# Patient Record
Sex: MALE | URBAN - METROPOLITAN AREA
[De-identification: names, ages, dates, MRNs, and addresses within clinical notes are randomized per-mention and may not be internally consistent; named-entity substitution may affect disease eponyms.]

---

## 2019-10-10 ENCOUNTER — APPOINTMENT (OUTPATIENT)
Dept: URBAN - METROPOLITAN AREA CLINIC 186 | Age: 59
Setting detail: DERMATOLOGY
End: 2019-10-10

## 2019-10-10 DIAGNOSIS — L57.0 ACTINIC KERATOSIS: ICD-10-CM

## 2019-10-10 PROBLEM — D04.72 CARCINOMA IN SITU OF SKIN OF LEFT LOWER LIMB, INCLUDING HIP: Status: ACTIVE | Noted: 2019-10-10

## 2019-10-10 PROBLEM — D48.5 NEOPLASM OF UNCERTAIN BEHAVIOR OF SKIN: Status: ACTIVE | Noted: 2019-10-10

## 2019-10-10 PROCEDURE — OTHER ADDITIONAL NOTES: OTHER

## 2019-10-10 PROCEDURE — OTHER DEFER: OTHER

## 2019-10-10 PROCEDURE — OTHER LIQUID NITROGEN: OTHER

## 2019-10-10 PROCEDURE — 17000 DESTRUCT PREMALG LESION: CPT

## 2019-10-10 PROCEDURE — 99241: CPT | Mod: 25

## 2019-10-10 PROCEDURE — OTHER MIPS QUALITY: OTHER

## 2019-10-10 PROCEDURE — OTHER COUNSELING: OTHER

## 2019-10-10 PROCEDURE — 17003 DESTRUCT PREMALG LES 2-14: CPT

## 2019-10-10 ASSESSMENT — LOCATION SIMPLE DESCRIPTION DERM
LOCATION SIMPLE: POSTERIOR SCALP
LOCATION SIMPLE: POSTERIOR NECK

## 2019-10-10 ASSESSMENT — LOCATION ZONE DERM
LOCATION ZONE: SCALP
LOCATION ZONE: NECK

## 2019-10-10 ASSESSMENT — LOCATION DETAILED DESCRIPTION DERM
LOCATION DETAILED: RIGHT INFERIOR OCCIPITAL SCALP
LOCATION DETAILED: RIGHT SUPERIOR POSTERIOR NECK

## 2019-10-10 NOTE — PROCEDURE: ADDITIONAL NOTES
Additional Notes: 5mm margins with flap\\nRecommended 1 year skin exams\\n1.2x1.7
Detail Level: Generalized
Additional Notes: Recheck Actinic Keratoses during excision

## 2019-10-10 NOTE — PROCEDURE: DEFER
Detail Level: Detailed
Procedure To Be Performed At Next Visit: Excision
Introduction Text (Please End With A Colon): The following procedure was deferred: schedule next available surgical time

## 2019-11-12 ENCOUNTER — APPOINTMENT (OUTPATIENT)
Dept: URBAN - METROPOLITAN AREA CLINIC 186 | Age: 59
Setting detail: DERMATOLOGY
End: 2019-11-12

## 2019-11-12 PROBLEM — D04.72 CARCINOMA IN SITU OF SKIN OF LEFT LOWER LIMB, INCLUDING HIP: Status: ACTIVE | Noted: 2019-11-12

## 2019-11-12 PROCEDURE — OTHER ADDITIONAL NOTES: OTHER

## 2019-11-12 PROCEDURE — OTHER EXCISION: OTHER

## 2019-11-12 PROCEDURE — 11604 EXC TR-EXT MAL+MARG 3.1-4 CM: CPT

## 2019-11-12 PROCEDURE — OTHER MIPS QUALITY: OTHER

## 2019-11-12 PROCEDURE — 12031 INTMD RPR S/A/T/EXT 2.5 CM/<: CPT

## 2019-11-12 PROCEDURE — OTHER COUNSELING: OTHER

## 2019-11-12 NOTE — PROCEDURE: EXCISION
Path Notes (To The Dermatopathologist): Please check margins. AC#  [id number] Scored superior edges and inked, previous pathology is attached Path Notes (To The Dermatopathologist): Please check margins. AC#  [id number] Scored superior edges and inked, previous pathology is attached

## 2019-11-12 NOTE — PROCEDURE: EXCISION

## 2019-11-12 NOTE — PROCEDURE: ADDITIONAL NOTES
Detail Level: Generalized
Additional Notes: 5mm margins with flap\\nRecommended 1 year skin exams\\n1.2x1.7

## 2019-12-02 ENCOUNTER — APPOINTMENT (OUTPATIENT)
Dept: URBAN - METROPOLITAN AREA CLINIC 186 | Age: 59
Setting detail: DERMATOLOGY
End: 2019-12-02

## 2019-12-02 DIAGNOSIS — Z48.817 ENCOUNTER FOR SURGICAL AFTERCARE FOLLOWING SURGERY ON THE SKIN AND SUBCUTANEOUS TISSUE: ICD-10-CM

## 2019-12-02 DIAGNOSIS — Z48.02 ENCOUNTER FOR REMOVAL OF SUTURES: ICD-10-CM

## 2019-12-02 PROBLEM — F32.9 MAJOR DEPRESSIVE DISORDER, SINGLE EPISODE, UNSPECIFIED: Status: ACTIVE | Noted: 2019-12-02

## 2019-12-02 PROCEDURE — OTHER SUTURE REMOVAL (NO GLOBAL PERIOD): OTHER

## 2019-12-02 PROCEDURE — OTHER ADDITIONAL NOTES: OTHER

## 2019-12-02 PROCEDURE — OTHER ORDER TESTS: OTHER

## 2019-12-02 PROCEDURE — OTHER TREATMENT REGIMEN: OTHER

## 2019-12-02 PROCEDURE — 99212 OFFICE O/P EST SF 10 MIN: CPT

## 2019-12-02 PROCEDURE — OTHER POST-OP WOUND EVALUATION: OTHER

## 2019-12-02 ASSESSMENT — LOCATION ZONE DERM: LOCATION ZONE: LEG

## 2019-12-02 ASSESSMENT — LOCATION SIMPLE DESCRIPTION DERM: LOCATION SIMPLE: LEFT THIGH

## 2019-12-02 ASSESSMENT — LOCATION DETAILED DESCRIPTION DERM: LOCATION DETAILED: LEFT ANTERIOR LATERAL PROXIMAL THIGH

## 2019-12-02 NOTE — PROCEDURE: POST-OP WOUND EVALUATION
Wound Diameter In Cm(Optional): 0
Wound Edema?: moderate
Wound Color?: red
Detail Level: Detailed
Wound Odor?: mild
Wound Evaluated By (Optional): Dr YOO

## 2019-12-02 NOTE — PROCEDURE: TREATMENT REGIMEN
Initiate Treatment: Take Doryx 120mpc twice daily for ten days. Cleanse wound with hydrogen peroxide and apply triple antibiotic ointment twice daily and keep site covered
Samples Given: Doryx 120 mpc
Detail Level: Zone

## 2019-12-05 ENCOUNTER — RX ONLY (RX ONLY)
Age: 59
End: 2019-12-05

## 2019-12-16 ENCOUNTER — APPOINTMENT (OUTPATIENT)
Dept: URBAN - METROPOLITAN AREA CLINIC 186 | Age: 59
Setting detail: DERMATOLOGY
End: 2019-12-16

## 2019-12-16 DIAGNOSIS — L40.0 PSORIASIS VULGARIS: ICD-10-CM

## 2019-12-16 DIAGNOSIS — Z48.817 ENCOUNTER FOR SURGICAL AFTERCARE FOLLOWING SURGERY ON THE SKIN AND SUBCUTANEOUS TISSUE: ICD-10-CM

## 2019-12-16 PROBLEM — I10 ESSENTIAL (PRIMARY) HYPERTENSION: Status: ACTIVE | Noted: 2019-12-16

## 2019-12-16 PROCEDURE — 99213 OFFICE O/P EST LOW 20 MIN: CPT

## 2019-12-16 PROCEDURE — OTHER COUNSELING: OTHER

## 2019-12-16 PROCEDURE — OTHER PRESCRIPTION: OTHER

## 2019-12-16 RX ORDER — CLOBETASOL PROPIONATE 0.5 MG/G
OINTMENT TOPICAL
Qty: 1 | Refills: 2 | Status: ERX | COMMUNITY
Start: 2019-12-16

## 2019-12-16 ASSESSMENT — PGA PSORIASIS: PGA PSORIASIS: MILD (MILD PLAQUE ELEVATION, LIGHT ERYTHEMA, FINE SCALE PREDOMINATES)

## 2019-12-16 ASSESSMENT — LOCATION SIMPLE DESCRIPTION DERM
LOCATION SIMPLE: RIGHT POSTERIOR THIGH
LOCATION SIMPLE: LEFT THIGH

## 2019-12-16 ASSESSMENT — BSA PSORIASIS: % BODY COVERED IN PSORIASIS: 2

## 2019-12-16 ASSESSMENT — LOCATION ZONE DERM: LOCATION ZONE: LEG

## 2019-12-16 ASSESSMENT — LOCATION DETAILED DESCRIPTION DERM
LOCATION DETAILED: RIGHT PROXIMAL POSTERIOR THIGH
LOCATION DETAILED: LEFT ANTERIOR LATERAL PROXIMAL THIGH

## 2021-08-24 ENCOUNTER — OFFICE VISIT (OUTPATIENT)
Dept: FAMILY MEDICINE CLINIC | Age: 61
End: 2021-08-24
Payer: COMMERCIAL

## 2021-08-24 VITALS
HEART RATE: 74 BPM | SYSTOLIC BLOOD PRESSURE: 122 MMHG | OXYGEN SATURATION: 95 % | HEIGHT: 74 IN | BODY MASS INDEX: 35.01 KG/M2 | WEIGHT: 272.8 LBS | TEMPERATURE: 97.7 F | DIASTOLIC BLOOD PRESSURE: 86 MMHG

## 2021-08-24 DIAGNOSIS — Z13.1 SCREENING FOR DIABETES MELLITUS: ICD-10-CM

## 2021-08-24 DIAGNOSIS — N40.1 BENIGN PROSTATIC HYPERPLASIA WITH POST-VOID DRIBBLING: ICD-10-CM

## 2021-08-24 DIAGNOSIS — Z12.11 SCREEN FOR COLON CANCER: ICD-10-CM

## 2021-08-24 DIAGNOSIS — M77.8 LEFT WRIST TENDONITIS: ICD-10-CM

## 2021-08-24 DIAGNOSIS — R35.0 URINARY FREQUENCY: ICD-10-CM

## 2021-08-24 DIAGNOSIS — N39.43 BENIGN PROSTATIC HYPERPLASIA WITH POST-VOID DRIBBLING: ICD-10-CM

## 2021-08-24 DIAGNOSIS — L40.9 PSORIASIS: ICD-10-CM

## 2021-08-24 DIAGNOSIS — Z12.5 SCREENING FOR PROSTATE CANCER: ICD-10-CM

## 2021-08-24 DIAGNOSIS — Z13.220 SCREENING, LIPID: ICD-10-CM

## 2021-08-24 DIAGNOSIS — Z00.00 PREVENTATIVE HEALTH CARE: Primary | ICD-10-CM

## 2021-08-24 PROCEDURE — 99396 PREV VISIT EST AGE 40-64: CPT | Performed by: PHYSICIAN ASSISTANT

## 2021-08-24 RX ORDER — CLOBETASOL PROPIONATE 0.5 MG/G
CREAM TOPICAL DAILY
Qty: 60 G | Refills: 11 | Status: SHIPPED | OUTPATIENT
Start: 2021-08-24

## 2021-08-24 RX ORDER — FINASTERIDE 5 MG/1
5 TABLET, FILM COATED ORAL DAILY
Qty: 30 TABLET | Refills: 11 | Status: SHIPPED | OUTPATIENT
Start: 2021-08-24

## 2021-08-24 RX ORDER — TAMSULOSIN HYDROCHLORIDE 0.4 MG/1
0.4 CAPSULE ORAL DAILY
Qty: 30 CAPSULE | Refills: 11 | Status: SHIPPED | OUTPATIENT
Start: 2021-08-24 | End: 2022-09-19 | Stop reason: SDUPTHER

## 2021-08-24 RX ORDER — CLOBETASOL PROPIONATE 0.5 MG/G
CREAM TOPICAL 2 TIMES DAILY
COMMUNITY
End: 2021-08-24 | Stop reason: SDUPTHER

## 2021-08-24 RX ORDER — FINASTERIDE 5 MG/1
5 TABLET, FILM COATED ORAL DAILY
COMMUNITY
End: 2021-08-24 | Stop reason: SDUPTHER

## 2021-08-24 RX ORDER — NAPROXEN 500 MG/1
500 TABLET ORAL 2 TIMES DAILY WITH MEALS
Qty: 60 TABLET | Refills: 3 | Status: SHIPPED | OUTPATIENT
Start: 2021-08-24 | End: 2022-01-21 | Stop reason: ALTCHOICE

## 2021-08-24 RX ORDER — TAMSULOSIN HYDROCHLORIDE 0.4 MG/1
0.4 CAPSULE ORAL DAILY
COMMUNITY
End: 2021-08-24 | Stop reason: SDUPTHER

## 2021-08-24 ASSESSMENT — ENCOUNTER SYMPTOMS
DIARRHEA: 0
SORE THROAT: 0
SHORTNESS OF BREATH: 0
VOICE CHANGE: 0
BACK PAIN: 0
CONSTIPATION: 0
ABDOMINAL PAIN: 0
TROUBLE SWALLOWING: 0
EYE PAIN: 0
COUGH: 0
CHEST TIGHTNESS: 0

## 2021-08-24 ASSESSMENT — PATIENT HEALTH QUESTIONNAIRE - PHQ9
2. FEELING DOWN, DEPRESSED OR HOPELESS: 0
SUM OF ALL RESPONSES TO PHQ QUESTIONS 1-9: 0
SUM OF ALL RESPONSES TO PHQ QUESTIONS 1-9: 0
1. LITTLE INTEREST OR PLEASURE IN DOING THINGS: 0
SUM OF ALL RESPONSES TO PHQ9 QUESTIONS 1 & 2: 0
SUM OF ALL RESPONSES TO PHQ QUESTIONS 1-9: 0

## 2021-08-24 NOTE — PATIENT INSTRUCTIONS
Dermatologists:      DERMATOLOGY [phone]  Gennette Graver, MD Sharie Deacon, MD Vashti Simmer, MD Berle Pier, MD Verita Kayser, CNP   [place], [place]       [facility]  [address] [facility], [place], [facility] [address]  Phone: [phone]      [facility] \[facility]\""  [phone]  [address], [place]     Dr. Rayray Mas MD  [phone] 205 [address]    MD Meredith Mckeon MD  [phone]  [address], [address] [facility]  [address]  [phone]    Orthopedists:      [address] [facility]  [address] MD Jomar Collazo MD Rankin Snipes, MD Janifer Southerly, MD Benjie Spindle, MD    [address]  [address] [facility] [address] MD Josy Oconnor MD    [phone]    [address]  Various locations  MD Gisselle Bunn MD Melvern Degree, MD Alyson Keepers, MD Nancy Heading, MD    [phone]      [facility]  MD Cristi Angel MD Olevia Diego, MD     [phone]      Maybell Rogers Dr. Anselmo Moder Dr. Roena Ormond Dr. Maybelle Puller Dr. Charlee Pita Dr. Fenton Shank Dr. Alphonse Lia Dr. Freddie Jing Dr. Neoma Catalina    [phone]) [address] [facility]  Various locations  MD Sherice Coates MD Marlow Lusty, MD Sung Maffucci, MD Merri Seidel, MD    [phone]          Thank you for enrolling in [address] Please follow the instructions below to securely access your online medical record. [employer] allows you to send messages to your doctor, view your test results, renew your prescriptions, schedule appointments, and more. How Do I Sign Up? 1.  In your Internet browser, go to [email or web address]  2. Click on the Sign Up Now link in the Sign In box. You will see the New Member Sign Up page. 3. Enter your [employer] Access Code exactly as it appears below. You will not need to use this code after youve completed the sign-up process. If you do not sign up before the expiration date, you must request a new code. [employer] Access Code: [email or web address]  Expires: 10/8/2021  8:10 AM    4. Enter your Social Security Number ([id number]) and Date of Birth (mm/dd/yyyy) as indicated and click Submit. You will be taken to the next sign-up page. 5. Create a [employer] ID. This will be your [employer] login ID and cannot be changed, so think of one that is secure and easy to remember. 6. Create a [employer] password. You can change your password at any time. 7. Enter your Password Reset Question and Answer. This can be used at a later time if you forget your password. 8. Enter your e-mail address. You will receive e-mail notification when new information is available in [address] 9. Click Sign Up. You can now view your medical record. Additional Information  If you have questions, please contact your physician practice where you receive care. Remember, [employer] is NOT to be used for urgent needs. For medical emergencies, dial 911Rema Sow was seen today for new patient. Diagnoses and all orders for this visit:    Preventative health care    Benign prostatic hyperplasia with post-void dribbling  -     finasteride (PROSCAR) 5 MG tablet; Take 1 tablet by mouth daily  -     tamsulosin (FLOMAX) 0.4 MG capsule; Take 1 capsule by mouth daily    Urinary frequency  -     finasteride (PROSCAR) 5 MG tablet; Take 1 tablet by mouth daily  -     tamsulosin (FLOMAX) 0.4 MG capsule; Take 1 capsule by mouth daily    Psoriasis  -     clobetasol (TEMOVATE) 0.05 % cream; Apply topically daily Apply topically once daily    Screening, lipid  -     LIPID PANEL;  Future    Screening for diabetes mellitus  -     Comprehensive Metabolic Panel    Screening for prostate cancer  -     PSA screening    Screen for colon cancer  -     COLONOSCOPY (Screening); Future    Left wrist tendonitis  -     naproxen (NAPROSYN) 500 MG tablet; Take 1 tablet by mouth 2 times daily (with meals)       Get routine labs, colonoscopy, get wrist brace and take naprosyn and send me a message in a few weeks with how it is going, get eye and dental exam, return here in a year or sooner if needed.

## 2021-08-24 NOTE — PROGRESS NOTES
Subjective:      Patient ID: Denilson Corrigan is a 57 y.o. male. HPI Patient is here today as a new patient visit to establish care. No major medical issues in the past. Just moved here from [place]. He used to have a herniated esophagus and had that repaired in 2019, hammer toe surgery on each foot in 2019. He has BPH and took flomax for it but ran out a year ago because he didn't have a doctor. He would like that refilled. He has psoriasis and uses clobetasol for it. Says it works ok. About 6 weeks ago, he was mowing the lawn and brake got caught and twisted his wrist. It is still bothering him. No radiating pain, no numbness/tingling in fingers. He sleeps well. He has a good appetite. He eats pretty healthy. He walks and says he needs to do more for exercise. No bowel issues. He has urinary dribbling and frequency. Tdap current. He has not gotten covid vaccines. He is overdue for eye exam. He does get routine dental exams. Last colonoscopy was about 10 years ago. Review of Systems   Constitutional: Negative for appetite change, fatigue and unexpected weight change. HENT: Negative for congestion, dental problem, ear pain, hearing loss, sore throat, trouble swallowing and voice change. Eyes: Negative for pain and visual disturbance. Respiratory: Negative for cough, chest tightness and shortness of breath. Cardiovascular: Negative for chest pain and palpitations. Gastrointestinal: Negative for abdominal pain, constipation and diarrhea. Genitourinary: Negative for difficulty urinating. Musculoskeletal: Negative for arthralgias, back pain, myalgias and neck pain. Skin: Positive for rash (psoriasis). Neurological: Negative for dizziness, speech difficulty, weakness, numbness and headaches. Hematological: Negative for adenopathy. Psychiatric/Behavioral: Negative for confusion and sleep disturbance. The patient is not nervous/anxious.         Objective: Physical Exam  Vitals reviewed. Constitutional:       Appearance: Normal appearance. He is well-developed and well-groomed. HENT:      Head: Normocephalic. Right Ear: Tympanic membrane and ear canal normal.      Left Ear: Tympanic membrane and ear canal normal.      Nose: Nose normal.      Mouth/Throat:      Pharynx: Oropharynx is clear. Uvula midline. Eyes:      Conjunctiva/sclera: Conjunctivae normal.      Pupils: Pupils are equal, round, and reactive to light. Neck:      Thyroid: No thyromegaly. Trachea: Trachea normal.   Cardiovascular:      Rate and Rhythm: Normal rate and regular rhythm. Chest Wall: PMI is not displaced. Pulses: Normal pulses. Heart sounds: Normal heart sounds. Pulmonary:      Effort: Pulmonary effort is normal.      Breath sounds: Normal breath sounds. Abdominal:      General: Abdomen is protuberant. Bowel sounds are normal.      Palpations: Abdomen is soft. Tenderness: There is no abdominal tenderness. There is no guarding or rebound. Hernia: No hernia is present. Musculoskeletal:      Cervical back: Normal range of motion and neck supple. No muscular tenderness. Thoracic back: Normal.      Lumbar back: Normal.      Comments: Full ROM and strength of torso and extremities, gait normal   Lymphadenopathy:      Cervical: No cervical adenopathy. Skin:     General: Skin is warm and dry. Findings: Rash (plaque patches on legs and arms) present. Neurological:      Mental Status: He is alert and oriented to person, place, and time. Cranial Nerves: No cranial nerve deficit. Sensory: No sensory deficit. Gait: Gait normal.   Psychiatric:         Attention and Perception: Attention normal.         Mood and Affect: Mood normal.         Speech: Speech normal.         Behavior: Behavior normal. Behavior is cooperative. Assessment:      Nonda Goodell was seen today for new patient.     Diagnoses and all orders for this visit:    Preventative health care    Benign prostatic hyperplasia with post-void dribbling  -     finasteride (PROSCAR) 5 MG tablet; Take 1 tablet by mouth daily  -     tamsulosin (FLOMAX) 0.4 MG capsule; Take 1 capsule by mouth daily    Urinary frequency  -     finasteride (PROSCAR) 5 MG tablet; Take 1 tablet by mouth daily  -     tamsulosin (FLOMAX) 0.4 MG capsule; Take 1 capsule by mouth daily    Psoriasis  -     clobetasol (TEMOVATE) 0.05 % cream; Apply topically daily Apply topically once daily    Screening, lipid  -     LIPID PANEL; Future    Screening for diabetes mellitus  -     Comprehensive Metabolic Panel    Screening for prostate cancer  -     PSA screening    Screen for colon cancer  -     COLONOSCOPY (Screening); Future    Left wrist tendonitis  -     naproxen (NAPROSYN) 500 MG tablet; Take 1 tablet by mouth 2 times daily (with meals)           Plan:      Refilled meds, get routine labs, get colonoscopy, naprosyn and wrist brace, get eye and dental exam, return here in a year or sooner if needed.          Billy Hernandez, [address]

## 2021-10-11 ENCOUNTER — NURSE TRIAGE (OUTPATIENT)
Dept: OTHER | Facility: CLINIC | Age: 61
End: 2021-10-11

## 2021-10-11 NOTE — TELEPHONE ENCOUNTER
Received call from Yuly Farnsworth at [facility] with [employer] Complaint. Brief description of triage:  Durel Bernheim had RSV, patient states that he feels that he also has it and has had it for 3 weeks    Triage indicates for patient to see PCP with in 24 hours, patient encouraged to go to the [facility] if no available appointments     Care advice provided, patient verbalizes understanding; denies any other questions or concerns; instructed to call back for any new or worsening symptoms. Writer provided warm transfer to [facility] at [facility] for appointment scheduling. Attention Provider: Thank you for allowing me to participate in the care of your patient. The patient was connected to triage in response to information provided to the ECC/PSC. Please do not respond through this encounter as the response is not directed to a shared pool. Reason for Disposition   [1] Fever returns after gone for over 24 hours AND [2] symptoms worse or not improved    Answer Assessment - Initial Assessment Questions  1. LOCATION: \"Where does it hurt? \"       Right between the eyes    2. ONSET: \"When did the sinus pain start? \"  (e.g., hours, days)       Three weeks ago    3. SEVERITY: \"How bad is the pain? \"   (Scale 1-10; mild, moderate or severe)    - MILD (1-3): doesn't interfere with normal activities     - MODERATE (4-7): interferes with normal activities (e.g., work or school) or awakens from sleep    - SEVERE (8-10): excruciating pain and patient unable to do any normal activities         5    4. RECURRENT SYMPTOM: \"Have you ever had sinus problems before? \" If so, ask: \"When was the last time? \" and \"What happened that time? \"       No    5. NASAL CONGESTION: \"Is the nose blocked? \" If so, ask, \"Can you open it or must you breathe through the mouth? \"      Can breathe through the mouth    6. NASAL DISCHARGE: \"Do you have discharge from your nose? \" If so ask, \"What color? \"      White    7.  FEVER: \"Do you have a fever? \" If so, ask: \"What is it, how was it measured, and when did it start? \"       slight fever at night, then in the morning it goes away    8. OTHER SYMPTOMS: \"Do you have any other symptoms? \" (e.g., sore throat, cough, earache, difficulty breathing)      Cough, sore throat    9. PREGNANCY: \"Is there any chance you are pregnant? \" \"When was your last menstrual period? \"      n/a    Protocols used: SINUS PAIN OR CONGESTION-ADULT-AH

## 2021-10-12 ENCOUNTER — TELEPHONE (OUTPATIENT)
Dept: FAMILY MEDICINE CLINIC | Age: 61
End: 2021-10-12

## 2021-10-12 ENCOUNTER — OFFICE VISIT (OUTPATIENT)
Dept: FAMILY MEDICINE CLINIC | Age: 61
End: 2021-10-12
Payer: COMMERCIAL

## 2021-10-12 VITALS — TEMPERATURE: 97.1 F | OXYGEN SATURATION: 96 % | HEART RATE: 72 BPM

## 2021-10-12 DIAGNOSIS — J20.9 ACUTE BRONCHITIS, UNSPECIFIED ORGANISM: ICD-10-CM

## 2021-10-12 PROCEDURE — 99213 OFFICE O/P EST LOW 20 MIN: CPT | Performed by: NURSE PRACTITIONER

## 2021-10-12 RX ORDER — AZITHROMYCIN 250 MG/1
250 TABLET, FILM COATED ORAL SEE ADMIN INSTRUCTIONS
Qty: 6 TABLET | Refills: 0 | Status: SHIPPED | OUTPATIENT
Start: 2021-10-12 | End: 2021-10-17

## 2021-10-12 NOTE — PROGRESS NOTES
Patricia Hernandes  : 1960  Encounter date: 10/12/2021    This alice 57 y.o. male who presents with  Chief Complaint   Patient presents with    Cough       History of present illness:    HPI Pt is 57year old male seen in [facility] for chest congestion, productive cough started 3 weeks ago. Pt reports exposure to RSV. Denies fevers or bodyaches, pt not vaccinated. Denies existing respiratory conditions. Current Outpatient Medications on File Prior to Visit   Medication Sig Dispense Refill    finasteride (PROSCAR) 5 MG tablet Take 1 tablet by mouth daily 30 tablet 11    clobetasol (TEMOVATE) 0.05 % cream Apply topically daily Apply topically once daily 60 g 11    tamsulosin (FLOMAX) 0.4 MG capsule Take 1 capsule by mouth daily 30 capsule 11    naproxen (NAPROSYN) 500 MG tablet Take 1 tablet by mouth 2 times daily (with meals) 60 tablet 3     No current facility-administered medications on file prior to visit. No Known Allergies  Past Medical History:   Diagnosis Date    BPH (benign prostatic hyperplasia)     Psoriasis       Past Surgical History:   Procedure Laterality Date    COLONOSCOPY      ESOPHAGUS SURGERY  2019    hernia    FOOT SURGERY  2019    bilateraly hammer toes    UPPER GASTROINTESTINAL ENDOSCOPY        Family History   Problem Relation Age of Onset    High Blood Pressure Mother     High Cholesterol Mother       Social History     Tobacco Use    Smoking status: Never Smoker    Smokeless tobacco: Never Used   Substance Use Topics    Alcohol use: Yes     Comment: occasionally        Review of Systems    Objective:    Pulse 72   Temp 97.1 °F (36.2 °C)   SpO2 96%         BP Readings from Last 3 Encounters:   21 122/86     Wt Readings from Last 3 Encounters:   21 272 lb 12.8 oz (123.7 kg)     BMI Readings from Last 3 Encounters:   21 35.03 kg/m²       Physical Exam  Vitals reviewed. Constitutional:       Appearance: Normal appearance. He is well-developed. HENT:      Nose: Congestion and rhinorrhea present. Cardiovascular:      Rate and Rhythm: Normal rate and regular rhythm. Heart sounds: Normal heart sounds. No murmur heard. Pulmonary:      Effort: Pulmonary effort is normal.      Breath sounds: Rhonchi present. No wheezing. Skin:     General: Skin is warm and dry. Neurological:      Mental Status: He is alert and oriented to person, place, and time. Psychiatric:         Mood and Affect: Mood normal.         Assessment/Plan    1. Acute bronchitis, unspecified organism  Advised OTC mucinex  Increased hydration  - azithromycin (ZITHROMAX) 250 MG tablet; Take 1 tablet by mouth See Admin Instructions for 5 days 500mg on day 1 followed by 250mg on days 2 - 5  Dispense: 6 tablet; Refill: 0      No follow-ups on file. This dictation was generated by voice recognition computer software. Although all attempts are made to edit the dictation for accuracy, there may be errors in the transcription that are not intended.

## 2021-10-12 NOTE — TELEPHONE ENCOUNTER
----- Message from Juan Carlos Michelle sent at 10/11/2021  6:44 PM EDT -----  Subject: Appointment Request    Reason for Call: Urgent Return from RN Triage    QUESTIONS  Type of Appointment? Established Patient  Reason for appointment request? No appointments available during search  Additional Information for Provider? Returned from nurse triage patient   who was flagged to be seen as soon as tomorrow. He was screened red. Suspects RSV from one of his grand children and having sinus congestion. Please follow up and have him scheduled for a virtual visit.   ---------------------------------------------------------------------------  --------------  [address]  What is the best way for the office to contact you? OK to leave message on   voicemail  Preferred Call Back Phone Number? [phone]  ---------------------------------------------------------------------------  --------------  SCRIPT ANSWERS  Patient needs to be seen today or tomorrow? Yes   Nurse Name? Tiara  Have you been diagnosed with, awaiting test results for, or told that you   are suspected of having COVID-19 (Coronavirus)? (If patient has tested   negative or was tested as a requirement for work, school, or travel and   not based on symptoms, answer no)? No  Within the past two weeks have you developed any of the following symptoms   (answer no if symptoms have been present longer than 2 weeks or began   more than 2 weeks ago)? Fever or Chills, Cough, Shortness of breath or   difficulty breathing, Loss of taste or smell, Sore throat, Nasal   congestion, Sneezing or runny nose, Fatigue or generalized body aches   (answer no if pain is specific to a body part e.g. back pain), Diarrhea,   Headache?  Yes

## 2021-12-13 ENCOUNTER — TELEPHONE (OUTPATIENT)
Dept: FAMILY MEDICINE CLINIC | Age: 61
End: 2021-12-13

## 2021-12-13 NOTE — TELEPHONE ENCOUNTER
----- Message from Madison Killian sent at 12/13/2021  3:45 PM EST -----  Subject: Appointment Request    Reason for Call: Routine (Patient Request) No Script    QUESTIONS  Type of Appointment? Established Patient  Reason for appointment request? Available appointments did not meet   patient need  Additional Information for Provider? screen red / pt is experiencing   chills and a fever as well as constant urination / pt would like an   appointment as soon as possible   ---------------------------------------------------------------------------  --------------  CALL BACK INFO  What is the best way for the office to contact you? OK to leave message on   voicemail  Preferred Call Back Phone Number? [phone]  ---------------------------------------------------------------------------  --------------  SCRIPT ANSWERS  Relationship to Patient? Self  (Is the patient requesting to see the provider for a procedure?)? No  (Is the patient requesting to see the provider urgently  today or   tomorrow. )? No  Have you been diagnosed with, awaiting test results for, or told that you   are suspected of having COVID-19 (Coronavirus)? (If patient has tested   negative or was tested as a requirement for work, school, or travel and   not based on symptoms, answer no)? No  Within the past two weeks have you developed any of the following symptoms   (answer no if symptoms have been present longer than 2 weeks or began   more than 2 weeks ago)? Fever or Chills, Cough, Shortness of breath or   difficulty breathing, Loss of taste or smell, Sore throat, Nasal   congestion, Sneezing or runny nose, Fatigue or generalized body aches   (answer no if pain is specific to a body part e.g. back pain), Diarrhea,   Headache?  Yes

## 2021-12-13 NOTE — TELEPHONE ENCOUNTER
Message left for patient regarding preventative care that patient is overdue for DM check with PCP. Clinic # provided to schedule   Needs to be red visit tomorrow please

## 2021-12-14 ENCOUNTER — OFFICE VISIT (OUTPATIENT)
Dept: FAMILY MEDICINE CLINIC | Age: 61
End: 2021-12-14
Payer: COMMERCIAL

## 2021-12-14 ENCOUNTER — HOSPITAL ENCOUNTER (OUTPATIENT)
Age: 61
Discharge: HOME | End: 2021-12-14
Payer: COMMERCIAL

## 2021-12-14 ENCOUNTER — HOSPITAL ENCOUNTER (OUTPATIENT)
Dept: GENERAL RADIOLOGY | Age: 61
Discharge: HOME | End: 2021-12-14
Payer: COMMERCIAL

## 2021-12-14 VITALS — TEMPERATURE: 101.5 F | OXYGEN SATURATION: 94 % | HEART RATE: 97 BPM

## 2021-12-14 DIAGNOSIS — Z20.822 SUSPECTED COVID-19 VIRUS INFECTION: ICD-10-CM

## 2021-12-14 DIAGNOSIS — Z20.822 SUSPECTED COVID-19 VIRUS INFECTION: Primary | ICD-10-CM

## 2021-12-14 DIAGNOSIS — J40 BRONCHITIS: ICD-10-CM

## 2021-12-14 LAB
INFLUENZA A ANTIBODY: NEGATIVE
INFLUENZA B ANTIBODY: NEGATIVE

## 2021-12-14 PROCEDURE — 99213 OFFICE O/P EST LOW 20 MIN: CPT | Performed by: NURSE PRACTITIONER

## 2021-12-14 PROCEDURE — 87804 INFLUENZA ASSAY W/OPTIC: CPT | Performed by: NURSE PRACTITIONER

## 2021-12-14 PROCEDURE — 71046 X-RAY EXAM CHEST 2 VIEWS: CPT

## 2021-12-14 RX ORDER — LEVOFLOXACIN 500 MG/1
500 TABLET, FILM COATED ORAL DAILY
Qty: 7 TABLET | Refills: 0 | Status: SHIPPED | OUTPATIENT
Start: 2021-12-14 | End: 2021-12-21 | Stop reason: SDUPTHER

## 2021-12-14 RX ORDER — PREDNISONE 10 MG/1
TABLET ORAL
Qty: 21 TABLET | Refills: 0 | Status: ON HOLD
Start: 2021-12-14 | End: 2022-01-10 | Stop reason: HOSPADM

## 2021-12-14 RX ORDER — ALBUTEROL SULFATE 90 UG/1
2 AEROSOL, METERED RESPIRATORY (INHALATION) 4 TIMES DAILY PRN
Qty: 54 G | Refills: 1 | Status: SHIPPED | OUTPATIENT
Start: 2021-12-14 | End: 2022-03-22

## 2021-12-14 NOTE — PROGRESS NOTES
2021  Zach Chisholm (:  1960)    Allergies: No Known Allergies        FLU/RESPIRATORY/COVID-19 CLINIC EVALUATION    HPI:   Chief Complaint   Patient presents with    Cough    Fever        SYMPTOMS:  PT is 57year old male with dyspnea, cough and low grade fever. INSTRUCTIONS:  \"[x]\" Indicates a positive item  \"[]\" Indicates a negative item        Symptom duration, days:    Date symptoms started : ____________    [] 1   [] 2   [] 3   [] 4 - 7   [x] 8 - 10   [] 11 - 13   [] >14    [x] Fevers    [] Symptom (not measured)  [] Measured (Result:  degrees)  [x] Chills  [x] Cough [x] Dry [] Productive   []Loss of Taste  [] Loss of Smell  []Decreased Appetite  [] Coughing up blood  }  [x] Chest Congestion  [] Nasal Congestion  [] Runny  Nose  [] Sneezing  [x] Feeling short of breath   []Sometimes    [x] Frequently    [] All the time     [] Chest pain     [x] Headaches  [x]Tolerable  [] Severe     [x] Fatigue  [] Sore throat  [] Muscle aches  [] Nausea  [] Vomiting  []Unable to keep fluids down     [] Diarrhea  [] Mild  []Severe       [] Vaccinated for COVID 19  [] History of COVID 19 (Date:           )      [] OTHER SYMPTOMS:      Symptom course:   [x] Worsening     [] Stable     [] Improving      RISK FACTORS:1INSTRUCTIONS:  \"[x]\" Indicates a positive item. Negative  for risk factors if not checked.     [] Close contact with a lab confirmed COVID-19 patient within 14 days of symptom onset  [] History of travel from affected geographical areas within 14 days of symptom onset        PHYSICAL EXAMINATION:    Vitals:    21 1511   Pulse: 97   Temp: 101.5 °F (38.6 °C)   SpO2: 94%          [x] Alert  [x] Oriented to person/place/time    [x] No apparent distress   [] Toxic appearing  [] Face flushed appearing     [x] Normal Mood  [] Anxious appearing      [] Sclera clear    [] Pinna, TMs,  Canals normal bilaterally  [] TM Red  [] Right [] Left [] Bilateral  [] TM Bulging [] Right [] Left []  Billateral    [] Oropharynx [] Clear [] Red [] Exudate [] Swollen    [] No adenopathy [] Adenopathy __________    [] Lungs clear with good movement and effort  [] Breathing appears normal     [] Speaks in complete sentences  [] Appears tachypneic   [x] Wheezing           [x] Rhonchi   [x] Decreased    [x] CV RRR  [] No Murmur  [] Murmur  [] Irregular  [] Tachycardic    [] OTHER:  1}      TESTS ORDERED:    [x] POCT FLU  [] POCT STREP  [x] COVID-19 Test sent  [] Appointment made at testing clinic for patient to get a COVID test.       TEST RESULTS:    POCT FLU test:  [] Positive  [] Negative  POCT STREP test:  [] Positive  [] Negative    ASSESSMENT:  [] Allergic Rhinitis  [] Asthma Exacerbation  [x] Bronchitis  [] COPD Exacerbation  [] Gastroenteritis  [] Influenza  [] Sinusitis  [] Strep Throat [] Sore Throat  [] Viral URI   [x] Possible COVID-19   [] Exposure to COVID -19  [] Positive for COVID  [] Screening for Viral Disease (COVID test no sx)        Kari Samuel was seen today for cough and fever. Diagnoses and all orders for this visit:    Suspected COVID-19 virus infection  -     XR CHEST LATERAL; Future  -     POCT Influenza A/B  -     COVID-19  -     albuterol sulfate HFA (VENTOLIN HFA) 108 (90 Base) MCG/ACT inhaler; Inhale 2 puffs into the lungs 4 times daily as needed for Wheezing    Bronchitis  -     predniSONE (DELTASONE) 10 MG tablet; 60 mg day 1(6 tabs), 50 mg day 2 (5 tabs), 40 mg day 3 (4 tabs), 30 mg day 4 (3 tabs), 20 mg day 5 (2 tabs), 10 mg day 6 (1 tab)  -     levoFLOXacin (LEVAQUIN) 500 MG tablet; Take 1 tablet by mouth daily for 7 days  -     albuterol sulfate HFA (VENTOLIN HFA) 108 (90 Base) MCG/ACT inhaler;  Inhale 2 puffs into the lungs 4 times daily as needed for Wheezing              [] Low risk for complications from COVID 19  [] Moderate risk for complications from COVID 19  [] High risk for complications from COVID 19    PLAN:    [] Discharge home with written instructions for:  [] Flu management  [] Strep throat management  [] Viral respiratory illness management  [] Sinusitis management  [x] Bronchitis Management  [x] Possible COVID-19 infection with self-quarantine and management of symptoms  [x] Follow-up with primary care physician or emergency department if worsens  [] Note given for work    [x] Referred to emergency department for evaluation

## 2021-12-15 ENCOUNTER — TELEPHONE (OUTPATIENT)
Dept: FAMILY MEDICINE CLINIC | Age: 61
End: 2021-12-15

## 2021-12-15 DIAGNOSIS — R91.8 ABNORMAL X-RAY OF LUNG: Primary | ICD-10-CM

## 2021-12-15 LAB — SARS-COV-2: NOT DETECTED

## 2021-12-15 NOTE — TELEPHONE ENCOUNTER
----- Message from Kimberlee Lozano sent at 12/14/2021  5:33 PM EST -----  Subject: Message to Provider    QUESTIONS  Information for Provider? Vi Dillon called [facility] call her at   [phone].   ---------------------------------------------------------------------------  --------------  CALL BACK INFO  What is the best way for the office to contact you? OK to leave message on   voicemail  Preferred Call Back Phone Number? [phone]  ---------------------------------------------------------------------------  --------------  SCRIPT ANSWERS  Relationship to Patient? Third Party  Representative Name?  Vi Dillon

## 2021-12-17 ENCOUNTER — TELEPHONE (OUTPATIENT)
Dept: FAMILY MEDICINE CLINIC | Age: 61
End: 2021-12-17

## 2021-12-17 NOTE — TELEPHONE ENCOUNTER
Patient called with concerns that he is not feeling any better.   He has 3-4 days left of his antibiotic      Please advise

## 2021-12-17 NOTE — TELEPHONE ENCOUNTER
Pt is going to give it through the weekend, finish his ATB, if he starts to feel worse he will go to the e/r.

## 2021-12-17 NOTE — TELEPHONE ENCOUNTER
No appointments available today. Can be seen Monday in [facility] or if symptoms are worsening should be seen in the emergency room.

## 2021-12-21 ENCOUNTER — TELEPHONE (OUTPATIENT)
Dept: FAMILY MEDICINE CLINIC | Age: 61
End: 2021-12-21

## 2021-12-21 DIAGNOSIS — J40 BRONCHITIS: ICD-10-CM

## 2021-12-21 RX ORDER — LEVOFLOXACIN 500 MG/1
500 TABLET, FILM COATED ORAL DAILY
Qty: 10 TABLET | Refills: 0 | Status: SHIPPED | OUTPATIENT
Start: 2021-12-21 | End: 2021-12-31

## 2021-12-22 ENCOUNTER — TELEPHONE (OUTPATIENT)
Dept: FAMILY MEDICINE CLINIC | Age: 61
End: 2021-12-22

## 2021-12-22 DIAGNOSIS — Z29.8 ENCOUNTER FOR HYDRATION PRIOR TO CT SCAN: Primary | ICD-10-CM

## 2021-12-22 NOTE — TELEPHONE ENCOUNTER
[id number] [facility] called and states that she will need a Bun Creatin lab before her CT Chest with contrast      Please advise

## 2021-12-23 ENCOUNTER — HOSPITAL ENCOUNTER (OUTPATIENT)
Dept: CT IMAGING | Age: 61
Discharge: HOME | End: 2021-12-23
Payer: COMMERCIAL

## 2021-12-23 DIAGNOSIS — R91.8 ABNORMAL X-RAY OF LUNG: ICD-10-CM

## 2021-12-23 LAB
BUN BLDV-MCNC: 15 MG/DL (ref 7–20)
CREAT SERPL-MCNC: 0.9 MG/DL (ref 0.8–1.3)
GFR AFRICAN AMERICAN: >60
GFR NON-AFRICAN AMERICAN: >60

## 2021-12-23 PROCEDURE — 36415 COLL VENOUS BLD VENIPUNCTURE: CPT

## 2021-12-23 PROCEDURE — 84520 ASSAY OF UREA NITROGEN: CPT

## 2021-12-23 PROCEDURE — 71270 CT THORAX DX C-/C+: CPT

## 2021-12-23 PROCEDURE — 82565 ASSAY OF CREATININE: CPT

## 2021-12-23 PROCEDURE — 6360000004 HC RX CONTRAST MEDICATION: Performed by: NURSE PRACTITIONER

## 2021-12-23 RX ADMIN — IOPAMIDOL 75 ML: 755 INJECTION, SOLUTION INTRAVENOUS at 17:17

## 2021-12-27 ENCOUNTER — TELEPHONE (OUTPATIENT)
Dept: FAMILY MEDICINE CLINIC | Age: 61
End: 2021-12-27

## 2021-12-27 DIAGNOSIS — R91.8 ABNORMAL CT SCAN OF LUNG: Primary | ICD-10-CM

## 2021-12-27 NOTE — TELEPHONE ENCOUNTER
Spoke with patient, reviewed recent CT lung scan, referrals placed for both oncology and pulmonology, advised to schedule appt. Pt voiced understanding.

## 2021-12-29 ENCOUNTER — OFFICE VISIT (OUTPATIENT)
Dept: FAMILY MEDICINE CLINIC | Age: 61
End: 2021-12-29
Payer: COMMERCIAL

## 2021-12-29 VITALS — HEART RATE: 80 BPM | OXYGEN SATURATION: 98 % | TEMPERATURE: 98.2 F

## 2021-12-29 DIAGNOSIS — J20.9 ACUTE BRONCHITIS DUE TO INFECTION: Primary | ICD-10-CM

## 2021-12-29 PROCEDURE — 99214 OFFICE O/P EST MOD 30 MIN: CPT | Performed by: NURSE PRACTITIONER

## 2021-12-29 RX ORDER — AZITHROMYCIN 250 MG/1
250 TABLET, FILM COATED ORAL SEE ADMIN INSTRUCTIONS
Qty: 6 TABLET | Refills: 0 | Status: SHIPPED | OUTPATIENT
Start: 2021-12-29 | End: 2022-01-03

## 2021-12-29 ASSESSMENT — ENCOUNTER SYMPTOMS
VOMITING: 0
DIARRHEA: 0
COUGH: 0
NAUSEA: 0
SHORTNESS OF BREATH: 0

## 2021-12-29 NOTE — PROGRESS NOTES
of disease if inadequately treated. Discussed side effects of medications, diagnosis, treatment options, and prognosis along with risks, benefits, complications, and alternatives of treatment including labs, imaging and other studies/treatment targets and goals. He verbalized understanding of instructions and counseling. Return if symptoms worsen or fail to improve. Medical decision making of moderate complexity.

## 2022-01-06 ENCOUNTER — HOSPITAL ENCOUNTER (INPATIENT)
Age: 62
LOS: 3 days | Discharge: HOME | DRG: 871 | End: 2022-01-10
Attending: INTERNAL MEDICINE | Admitting: INTERNAL MEDICINE
Payer: COMMERCIAL

## 2022-01-06 ENCOUNTER — APPOINTMENT (OUTPATIENT)
Dept: CT IMAGING | Age: 62
DRG: 871 | End: 2022-01-06
Payer: COMMERCIAL

## 2022-01-06 ENCOUNTER — TELEPHONE (OUTPATIENT)
Dept: FAMILY MEDICINE CLINIC | Age: 62
End: 2022-01-06

## 2022-01-06 DIAGNOSIS — J18.9 PNEUMONIA DUE TO INFECTIOUS ORGANISM, UNSPECIFIED LATERALITY, UNSPECIFIED PART OF LUNG: Primary | ICD-10-CM

## 2022-01-06 DIAGNOSIS — R91.8 LUNG MASS: ICD-10-CM

## 2022-01-06 DIAGNOSIS — Z78.9 FAILURE OF OUTPATIENT TREATMENT: ICD-10-CM

## 2022-01-06 LAB
A/G RATIO: 0.6 (ref 1.1–2.2)
ALBUMIN SERPL-MCNC: 2.9 G/DL (ref 3.4–5)
ALP BLD-CCNC: 57 U/L (ref 40–129)
ALT SERPL-CCNC: 19 U/L (ref 10–40)
ANION GAP SERPL CALCULATED.3IONS-SCNC: 13 MMOL/L (ref 3–16)
APTT: 36.3 SEC (ref 26.2–38.6)
AST SERPL-CCNC: 27 U/L (ref 15–37)
BANDED NEUTROPHILS RELATIVE PERCENT: 9 % (ref 0–7)
BASOPHILS ABSOLUTE: 0 K/UL (ref 0–0.2)
BASOPHILS RELATIVE PERCENT: 0 %
BILIRUB SERPL-MCNC: 0.3 MG/DL (ref 0–1)
BUN BLDV-MCNC: 27 MG/DL (ref 7–20)
C-REACTIVE PROTEIN: 191.5 MG/L (ref 0–5.1)
CALCIUM SERPL-MCNC: 9.1 MG/DL (ref 8.3–10.6)
CHLORIDE BLD-SCNC: 94 MMOL/L (ref 99–110)
CO2: 23 MMOL/L (ref 21–32)
CREAT SERPL-MCNC: 1 MG/DL (ref 0.8–1.3)
EOSINOPHILS ABSOLUTE: 0 K/UL (ref 0–0.6)
EOSINOPHILS RELATIVE PERCENT: 0 %
GFR AFRICAN AMERICAN: >60
GFR NON-AFRICAN AMERICAN: >60
GLUCOSE BLD-MCNC: 121 MG/DL (ref 70–99)
HCT VFR BLD CALC: 40.1 % (ref 40.5–52.5)
HEMOGLOBIN: 13.2 G/DL (ref 13.5–17.5)
INR BLD: 1.34 (ref 0.88–1.12)
LACTIC ACID, SEPSIS: 1 MMOL/L (ref 0.4–1.9)
LIPASE: 30 U/L (ref 13–60)
LYMPHOCYTES ABSOLUTE: 2.1 K/UL (ref 1–5.1)
LYMPHOCYTES RELATIVE PERCENT: 11 %
MCH RBC QN AUTO: 29 PG (ref 26–34)
MCHC RBC AUTO-ENTMCNC: 33 G/DL (ref 31–36)
MCV RBC AUTO: 88.1 FL (ref 80–100)
METAMYELOCYTES RELATIVE PERCENT: 1 %
MONOCYTES ABSOLUTE: 2.8 K/UL (ref 0–1.3)
MONOCYTES RELATIVE PERCENT: 15 %
NEUTROPHILS ABSOLUTE: 14 K/UL (ref 1.7–7.7)
NEUTROPHILS RELATIVE PERCENT: 64 %
PDW BLD-RTO: 15.1 % (ref 12.4–15.4)
PLATELET # BLD: 304 K/UL (ref 135–450)
PMV BLD AUTO: 7.2 FL (ref 5–10.5)
POTASSIUM SERPL-SCNC: 4.1 MMOL/L (ref 3.5–5.1)
PROCALCITONIN: 0.88 NG/ML (ref 0–0.15)
PROTHROMBIN TIME: 15.3 SEC (ref 9.9–12.7)
RBC # BLD: 4.55 M/UL (ref 4.2–5.9)
RBC # BLD: NORMAL 10*6/UL
SEDIMENTATION RATE, ERYTHROCYTE: 98 MM/HR (ref 0–20)
SLIDE REVIEW: ABNORMAL
SODIUM BLD-SCNC: 130 MMOL/L (ref 136–145)
TOTAL PROTEIN: 7.4 G/DL (ref 6.4–8.2)
TOXIC GRANULATION: PRESENT
TROPONIN: <0.01 NG/ML
WBC # BLD: 18.9 K/UL (ref 4–11)

## 2022-01-06 PROCEDURE — 93005 ELECTROCARDIOGRAM TRACING: CPT | Performed by: PHYSICIAN ASSISTANT

## 2022-01-06 PROCEDURE — 87040 BLOOD CULTURE FOR BACTERIA: CPT

## 2022-01-06 PROCEDURE — 83880 ASSAY OF NATRIURETIC PEPTIDE: CPT

## 2022-01-06 PROCEDURE — 2580000003 HC RX 258: Performed by: PHYSICIAN ASSISTANT

## 2022-01-06 PROCEDURE — 70450 CT HEAD/BRAIN W/O DYE: CPT

## 2022-01-06 PROCEDURE — 85025 COMPLETE CBC W/AUTO DIFF WBC: CPT

## 2022-01-06 PROCEDURE — 81001 URINALYSIS AUTO W/SCOPE: CPT

## 2022-01-06 PROCEDURE — 85730 THROMBOPLASTIN TIME PARTIAL: CPT

## 2022-01-06 PROCEDURE — 83690 ASSAY OF LIPASE: CPT

## 2022-01-06 PROCEDURE — 80053 COMPREHEN METABOLIC PANEL: CPT

## 2022-01-06 PROCEDURE — 86140 C-REACTIVE PROTEIN: CPT

## 2022-01-06 PROCEDURE — 71260 CT THORAX DX C+: CPT

## 2022-01-06 PROCEDURE — 85652 RBC SED RATE AUTOMATED: CPT

## 2022-01-06 PROCEDURE — 85610 PROTHROMBIN TIME: CPT

## 2022-01-06 PROCEDURE — 99283 EMERGENCY DEPT VISIT LOW MDM: CPT

## 2022-01-06 PROCEDURE — 83605 ASSAY OF LACTIC ACID: CPT

## 2022-01-06 PROCEDURE — 6360000004 HC RX CONTRAST MEDICATION: Performed by: PHYSICIAN ASSISTANT

## 2022-01-06 PROCEDURE — 84484 ASSAY OF TROPONIN QUANT: CPT

## 2022-01-06 PROCEDURE — 84145 PROCALCITONIN (PCT): CPT

## 2022-01-06 PROCEDURE — 36415 COLL VENOUS BLD VENIPUNCTURE: CPT

## 2022-01-06 RX ORDER — 0.9 % SODIUM CHLORIDE 0.9 %
1000 INTRAVENOUS SOLUTION INTRAVENOUS ONCE
Status: COMPLETED | OUTPATIENT
Start: 2022-01-06 | End: 2022-01-07

## 2022-01-06 RX ORDER — IPRATROPIUM BROMIDE AND ALBUTEROL SULFATE 2.5; .5 MG/3ML; MG/3ML
1 SOLUTION RESPIRATORY (INHALATION) ONCE
Status: COMPLETED | OUTPATIENT
Start: 2022-01-06 | End: 2022-01-07

## 2022-01-06 RX ADMIN — IOPAMIDOL 75 ML: 755 INJECTION, SOLUTION INTRAVENOUS at 23:25

## 2022-01-06 RX ADMIN — SODIUM CHLORIDE 1000 ML: 9 INJECTION, SOLUTION INTRAVENOUS at 23:53

## 2022-01-06 ASSESSMENT — PAIN SCALES - GENERAL: PAINLEVEL_OUTOF10: 7

## 2022-01-06 NOTE — TELEPHONE ENCOUNTER
Made follow up phone call to patient. Did see Oncology 3 days prior - Dr. Julio Aguilar - made referral to Pulmonologist - unable to see for a few more weeks. Patient has had no change over the past week, continues with fevers and generally not feeling well, SOB. Advised needs further evaluation and to report to ED for work up. Patient agreeable. State will be a few hours before he can get there.

## 2022-01-06 NOTE — TELEPHONE ENCOUNTER
----- Message from Drew Mukherjee sent at 1/6/2022 12:26 PM EST -----  Subject: Message to Provider    QUESTIONS  Information for Provider? Patient's wife thinks that the patient needs a   stronger medication for his fever that will not break, he is seeing a   pulmonologist, he needs a cough medication that's stronger, medicine to   help him relieve his discomfort, as he can not see the pulmonologist until   January 24, 2022. Please call. Thank you.   ---------------------------------------------------------------------------  --------------  CALL BACK INFO  What is the best way for the office to contact you? OK to leave message on   voicemail  Preferred Call Back Phone Number? [phone]  ---------------------------------------------------------------------------  --------------  SCRIPT ANSWERS  Relationship to Patient? Other  Representative Name? wife? Gaby Candelario  Is the Representative on the appropriate HIPAA document in Epic?  Yes

## 2022-01-07 ENCOUNTER — ANESTHESIA EVENT (OUTPATIENT)
Dept: ENDOSCOPY | Age: 62
End: 2022-01-07

## 2022-01-07 PROBLEM — J18.9 PNEUMONIA: Status: ACTIVE | Noted: 2022-01-07

## 2022-01-07 LAB
ANION GAP SERPL CALCULATED.3IONS-SCNC: 9 MMOL/L (ref 3–16)
BILIRUBIN URINE: NEGATIVE
BLOOD, URINE: ABNORMAL
BUN BLDV-MCNC: 20 MG/DL (ref 7–20)
CALCIUM SERPL-MCNC: 9.3 MG/DL (ref 8.3–10.6)
CHLORIDE BLD-SCNC: 97 MMOL/L (ref 99–110)
CLARITY: CLEAR
CO2: 27 MMOL/L (ref 21–32)
COLOR: YELLOW
COMMENT UA: ABNORMAL
CREAT SERPL-MCNC: 0.7 MG/DL (ref 0.8–1.3)
CRYSTALS, UA: ABNORMAL /HPF
EKG ATRIAL RATE: 93 BPM
EKG DIAGNOSIS: NORMAL
EKG P AXIS: 9 DEGREES
EKG P-R INTERVAL: 140 MS
EKG Q-T INTERVAL: 338 MS
EKG QRS DURATION: 84 MS
EKG QTC CALCULATION (BAZETT): 420 MS
EKG R AXIS: 43 DEGREES
EKG T AXIS: 24 DEGREES
EKG VENTRICULAR RATE: 93 BPM
EPITHELIAL CELLS, UA: 1 /HPF (ref 0–5)
GFR AFRICAN AMERICAN: >60
GFR NON-AFRICAN AMERICAN: >60
GLUCOSE BLD-MCNC: 106 MG/DL (ref 70–99)
GLUCOSE URINE: NEGATIVE MG/DL
HEMATOLOGY PATH CONSULT: NORMAL
HYALINE CASTS: 8 /LPF (ref 0–8)
KETONES, URINE: NEGATIVE MG/DL
LEUKOCYTE ESTERASE, URINE: NEGATIVE
MICROSCOPIC EXAMINATION: YES
NITRITE, URINE: NEGATIVE
PH UA: 5 (ref 5–8)
POTASSIUM SERPL-SCNC: 4.4 MMOL/L (ref 3.5–5.1)
PRO-BNP: 113 PG/ML (ref 0–124)
PROTEIN UA: 100 MG/DL
RBC UA: ABNORMAL /HPF (ref 0–4)
SODIUM BLD-SCNC: 133 MMOL/L (ref 136–145)
SPECIFIC GRAVITY UA: 1.02 (ref 1–1.03)
URINE REFLEX TO CULTURE: ABNORMAL
URINE TYPE: ABNORMAL
UROBILINOGEN, URINE: 0.2 E.U./DL
VANCOMYCIN RANDOM: <4 UG/ML
WBC UA: 3 /HPF (ref 0–5)

## 2022-01-07 PROCEDURE — 6360000002 HC RX W HCPCS: Performed by: PHYSICIAN ASSISTANT

## 2022-01-07 PROCEDURE — 99223 1ST HOSP IP/OBS HIGH 75: CPT | Performed by: INTERNAL MEDICINE

## 2022-01-07 PROCEDURE — 6360000002 HC RX W HCPCS: Performed by: NURSE PRACTITIONER

## 2022-01-07 PROCEDURE — 80048 BASIC METABOLIC PNL TOTAL CA: CPT

## 2022-01-07 PROCEDURE — 6370000000 HC RX 637 (ALT 250 FOR IP): Performed by: PHYSICIAN ASSISTANT

## 2022-01-07 PROCEDURE — 94640 AIRWAY INHALATION TREATMENT: CPT

## 2022-01-07 PROCEDURE — 2060000000 HC ICU INTERMEDIATE R&B

## 2022-01-07 PROCEDURE — 87070 CULTURE OTHR SPECIMN AEROBIC: CPT

## 2022-01-07 PROCEDURE — 36415 COLL VENOUS BLD VENIPUNCTURE: CPT

## 2022-01-07 PROCEDURE — 93010 ELECTROCARDIOGRAM REPORT: CPT | Performed by: INTERNAL MEDICINE

## 2022-01-07 PROCEDURE — 87205 SMEAR GRAM STAIN: CPT

## 2022-01-07 PROCEDURE — 2580000003 HC RX 258: Performed by: NURSE PRACTITIONER

## 2022-01-07 PROCEDURE — 2580000003 HC RX 258: Performed by: PHYSICIAN ASSISTANT

## 2022-01-07 PROCEDURE — 6360000002 HC RX W HCPCS: Performed by: INTERNAL MEDICINE

## 2022-01-07 PROCEDURE — 80202 ASSAY OF VANCOMYCIN: CPT

## 2022-01-07 PROCEDURE — 2580000003 HC RX 258: Performed by: INTERNAL MEDICINE

## 2022-01-07 PROCEDURE — 6370000000 HC RX 637 (ALT 250 FOR IP): Performed by: NURSE PRACTITIONER

## 2022-01-07 PROCEDURE — 87641 MR-STAPH DNA AMP PROBE: CPT

## 2022-01-07 RX ORDER — SODIUM CHLORIDE 9 MG/ML
25 INJECTION, SOLUTION INTRAVENOUS PRN
Status: DISCONTINUED | OUTPATIENT
Start: 2022-01-07 | End: 2022-01-10 | Stop reason: HOSPADM

## 2022-01-07 RX ORDER — ONDANSETRON 4 MG/1
4 TABLET, ORALLY DISINTEGRATING ORAL EVERY 8 HOURS PRN
Status: DISCONTINUED | OUTPATIENT
Start: 2022-01-07 | End: 2022-01-10 | Stop reason: HOSPADM

## 2022-01-07 RX ORDER — POLYETHYLENE GLYCOL 3350 17 G/17G
17 POWDER, FOR SOLUTION ORAL DAILY PRN
Status: DISCONTINUED | OUTPATIENT
Start: 2022-01-07 | End: 2022-01-10 | Stop reason: HOSPADM

## 2022-01-07 RX ORDER — FINASTERIDE 5 MG/1
5 TABLET, FILM COATED ORAL DAILY
Status: DISCONTINUED | OUTPATIENT
Start: 2022-01-07 | End: 2022-01-10 | Stop reason: HOSPADM

## 2022-01-07 RX ORDER — CLOBETASOL PROPIONATE 0.5 MG/G
CREAM TOPICAL DAILY
Status: DISCONTINUED | OUTPATIENT
Start: 2022-01-07 | End: 2022-01-10 | Stop reason: HOSPADM

## 2022-01-07 RX ORDER — SODIUM CHLORIDE, SODIUM LACTATE, POTASSIUM CHLORIDE, CALCIUM CHLORIDE 600; 310; 30; 20 MG/100ML; MG/100ML; MG/100ML; MG/100ML
INJECTION, SOLUTION INTRAVENOUS CONTINUOUS
Status: DISCONTINUED | OUTPATIENT
Start: 2022-01-07 | End: 2022-01-08 | Stop reason: ALTCHOICE

## 2022-01-07 RX ORDER — ACETAMINOPHEN 650 MG/1
650 SUPPOSITORY RECTAL EVERY 6 HOURS PRN
Status: DISCONTINUED | OUTPATIENT
Start: 2022-01-07 | End: 2022-01-10 | Stop reason: HOSPADM

## 2022-01-07 RX ORDER — ACETAMINOPHEN 325 MG/1
650 TABLET ORAL EVERY 6 HOURS PRN
Status: DISCONTINUED | OUTPATIENT
Start: 2022-01-07 | End: 2022-01-10 | Stop reason: DRUGHIGH

## 2022-01-07 RX ORDER — SODIUM CHLORIDE 0.9 % (FLUSH) 0.9 %
5-40 SYRINGE (ML) INJECTION PRN
Status: DISCONTINUED | OUTPATIENT
Start: 2022-01-07 | End: 2022-01-10 | Stop reason: HOSPADM

## 2022-01-07 RX ORDER — TAMSULOSIN HYDROCHLORIDE 0.4 MG/1
0.4 CAPSULE ORAL DAILY
Status: DISCONTINUED | OUTPATIENT
Start: 2022-01-07 | End: 2022-01-10 | Stop reason: HOSPADM

## 2022-01-07 RX ORDER — ONDANSETRON 2 MG/ML
4 INJECTION INTRAMUSCULAR; INTRAVENOUS EVERY 6 HOURS PRN
Status: DISCONTINUED | OUTPATIENT
Start: 2022-01-07 | End: 2022-01-10 | Stop reason: HOSPADM

## 2022-01-07 RX ORDER — SODIUM CHLORIDE 0.9 % (FLUSH) 0.9 %
5-40 SYRINGE (ML) INJECTION EVERY 12 HOURS SCHEDULED
Status: DISCONTINUED | OUTPATIENT
Start: 2022-01-07 | End: 2022-01-10 | Stop reason: HOSPADM

## 2022-01-07 RX ADMIN — Medication 10 ML: at 20:46

## 2022-01-07 RX ADMIN — SODIUM CHLORIDE, POTASSIUM CHLORIDE, SODIUM LACTATE AND CALCIUM CHLORIDE: 600; 310; 30; 20 INJECTION, SOLUTION INTRAVENOUS at 03:42

## 2022-01-07 RX ADMIN — CEFEPIME HYDROCHLORIDE 2000 MG: 2 INJECTION, POWDER, FOR SOLUTION INTRAVENOUS at 01:26

## 2022-01-07 RX ADMIN — IPRATROPIUM BROMIDE AND ALBUTEROL SULFATE 1 AMPULE: .5; 3 SOLUTION RESPIRATORY (INHALATION) at 00:59

## 2022-01-07 RX ADMIN — Medication 10 ML: at 09:48

## 2022-01-07 RX ADMIN — SODIUM CHLORIDE, POTASSIUM CHLORIDE, SODIUM LACTATE AND CALCIUM CHLORIDE: 600; 310; 30; 20 INJECTION, SOLUTION INTRAVENOUS at 14:01

## 2022-01-07 RX ADMIN — CEFEPIME HYDROCHLORIDE 2000 MG: 2 INJECTION, POWDER, FOR SOLUTION INTRAVENOUS at 14:25

## 2022-01-07 RX ADMIN — VANCOMYCIN HYDROCHLORIDE 1000 MG: 1 INJECTION, POWDER, LYOPHILIZED, FOR SOLUTION INTRAVENOUS at 16:46

## 2022-01-07 RX ADMIN — FINASTERIDE 5 MG: 5 TABLET, FILM COATED ORAL at 10:15

## 2022-01-07 RX ADMIN — ACETAMINOPHEN 650 MG: 325 TABLET ORAL at 10:18

## 2022-01-07 RX ADMIN — VANCOMYCIN HYDROCHLORIDE 1000 MG: 1 INJECTION, POWDER, LYOPHILIZED, FOR SOLUTION INTRAVENOUS at 03:56

## 2022-01-07 RX ADMIN — TAMSULOSIN HYDROCHLORIDE 0.4 MG: 0.4 CAPSULE ORAL at 10:15

## 2022-01-07 ASSESSMENT — ENCOUNTER SYMPTOMS
NAUSEA: 0
SHORTNESS OF BREATH: 0
ABDOMINAL PAIN: 0
GASTROINTESTINAL NEGATIVE: 1
RHINORRHEA: 0
DIARRHEA: 0
WHEEZING: 1
COUGH: 1
VOMITING: 0
EYES NEGATIVE: 1
SHORTNESS OF BREATH: 1

## 2022-01-07 ASSESSMENT — PAIN SCALES - GENERAL
PAINLEVEL_OUTOF10: 0

## 2022-01-07 NOTE — ANESTHESIA PRE PROCEDURE
Department of Anesthesiology  Preprocedure Note       Name:  Harvey Valdivia   Age:  57 y.o.  :  1960                                          MRN:  [id number]         Date:  2022      Surgeon: Clarita Ellis):  MD Lex Frye MD    Procedure: Procedure(s):  BRONCHOSCOPY ENDOBRONCHIAL ULTRASOUND    Medications prior to admission:   Prior to Admission medications    Medication Sig Start Date End Date Taking?  Authorizing Provider   albuterol sulfate HFA (VENTOLIN HFA) 108 (90 Base) MCG/ACT inhaler Inhale 2 puffs into the lungs 4 times daily as needed for Wheezing 21  Yes ERICA Flores NP   tamsulosin (FLOMAX) 0.4 MG capsule Take 1 capsule by mouth daily 21  Yes WESLY Lee   naproxen (NAPROSYN) 500 MG tablet Take 1 tablet by mouth 2 times daily (with meals) 21  Yes WESLY Lee   predniSONE (DELTASONE) 10 MG tablet 60 mg day 1(6 tabs), 50 mg day 2 (5 tabs), 40 mg day 3 (4 tabs), 30 mg day 4 (3 tabs), 20 mg day 5 (2 tabs), 10 mg day 6 (1 tab) 21   ERICA Wooten NP   finasteride (PROSCAR) 5 MG tablet Take 1 tablet by mouth daily 21   WESLY Lee   clobetasol (TEMOVATE) 0.05 % cream Apply topically daily Apply topically once daily 21   WESLY Lee       Current medications:    Current Facility-Administered Medications   Medication Dose Route Frequency Provider Last Rate Last Admin    finasteride (PROSCAR) tablet 5 mg  5 mg Oral Daily ERICA Lund - SABRINA        tamsulosin (FLOMAX) capsule 0.4 mg  0.4 mg Oral Daily Faviola SALUD AbarcaN - SABRINA        clobetasol (TEMOVATE) 0.05 % cream   Topical Daily Favoila Tevin APRN - CNP        sodium chloride flush 0.9 % injection 5-40 mL  5-40 mL IntraVENous 2 times per day Faviola Abarca APRN - CNP   10 mL at 22 0948    sodium chloride flush 0.9 % injection 5-40 mL  5-40 mL IntraVENous PRN ERICA Lund CNP        0.9 % sodium chloride infusion  25 mL IntraVENous PRN Faviola Tevin, APRN - CNP        [START ON 1/8/2022] enoxaparin (LOVENOX) injection 40 mg  40 mg SubCUTAneous Daily Faviola Minick, APRN - CNP        ondansetron (ZOFRAN-ODT) disintegrating tablet 4 mg  4 mg Oral Q8H PRN Faviola Minick, APRN - CNP        Or    ondansetron (ZOFRAN) injection 4 mg  4 mg IntraVENous Q6H PRN Faviola Minick, APRN - CNP        polyethylene glycol (GLYCOLAX) packet 17 g  17 g Oral Daily PRN Faviola Minick, APRN - CNP        acetaminophen (TYLENOL) tablet 650 mg  650 mg Oral Q6H PRN Faviola Minick, APRN - CNP        Or    acetaminophen (TYLENOL) suppository 650 mg  650 mg Rectal Q6H PRN Faviola Minick, APRN - CNP        cefepime (MAXIPIME) 2000 mg IVPB minibag  2,000 mg IntraVENous Q12H Faviola Minick, APRN - CNP        lactated ringers infusion   IntraVENous Continuous Faviola Tevin, APRN -  mL/hr at 01/07/22 0342 New Bag at 01/07/22 0342    vancomycin 1000 mg IVPB in 250 mL D5W addavial  1,000 mg IntraVENous Q12H Jesus Schneider MD           Allergies:  No Known Allergies    Problem List:    Patient Active Problem List   Diagnosis Code    Benign prostatic hyperplasia with post-void dribbling N40.1, N39.43    Urinary frequency R35.0    Psoriasis L40.9    Pneumonia J18.9       Past Medical History:        Diagnosis Date    BPH (benign prostatic hyperplasia)     Psoriasis        Past Surgical History:        Procedure Laterality Date    COLONOSCOPY      ESOPHAGUS SURGERY  2019    hernia    FOOT SURGERY  2019    bilateraly hammer toes    UPPER GASTROINTESTINAL ENDOSCOPY         Social History:    Social History     Tobacco Use    Smoking status: Never Smoker    Smokeless tobacco: Never Used   Substance Use Topics    Alcohol use: Yes     Comment: occasionally                                Counseling given: Not Answered      Vital Signs (Current):   Vitals:    01/07/22 0100 01/07/22 0215 01/07/22 0241 01/07/22 0741   BP: 119/71 114/71  131/68   Pulse: 92 96  96   Resp: 22 20 20   Temp:  37.5 °C (99.5 °F)  37.8 °C (100 °F)   TempSrc:  Oral  Oral   SpO2: 95% 95%  95%   Weight:   263 lb 6.4 oz (119.5 kg)    Height:   6' 2\" (1.88 m)                                               BP Readings from Last 3 Encounters:   01/07/22 131/68   08/24/21 122/86       NPO Status:                                                                                 BMI:   Wt Readings from Last 3 Encounters:   01/07/22 263 lb 6.4 oz (119.5 kg)   08/24/21 272 lb 12.8 oz (123.7 kg)     Body mass index is 33.82 kg/m². CBC:   Lab Results   Component Value Date    WBC 18.9 01/06/2022    RBC 4.55 01/06/2022    HGB 13.2 01/06/2022    HCT 40.1 01/06/2022    MCV 88.1 01/06/2022    RDW 15.1 01/06/2022     01/06/2022       CMP:   Lab Results   Component Value Date     01/06/2022    K 4.1 01/06/2022    CL 94 01/06/2022    CO2 23 01/06/2022    BUN 27 01/06/2022    CREATININE 1.0 01/06/2022    GFRAA >60 01/06/2022    AGRATIO 0.6 01/06/2022    LABGLOM >60 01/06/2022    GLUCOSE 121 01/06/2022    PROT 7.4 01/06/2022    CALCIUM 9.1 01/06/2022    BILITOT 0.3 01/06/2022    ALKPHOS 57 01/06/2022    AST 27 01/06/2022    ALT 19 01/06/2022       POC Tests: No results for input(s): POCGLU, POCNA, POCK, POCCL, POCBUN, POCHEMO, POCHCT in the last 72 hours.     Coags:   Lab Results   Component Value Date    PROTIME 15.3 01/06/2022    INR 1.34 01/06/2022    APTT 36.3 01/06/2022       HCG (If Applicable): No results found for: PREGTESTUR, PREGSERUM, HCG, HCGQUANT     ABGs: No results found for: PHART, PO2ART, UJR3OJR, ZDI1YHQ, BEART, H2NDTEZX     Type & Screen (If Applicable):  No results found for: LABABO, LABRH    Drug/Infectious Status (If Applicable):  No results found for: HIV, HEPCAB    COVID-19 Screening (If Applicable):   Lab Results   Component Value Date    COVID19 Not Detected 12/14/2021           Anesthesia Evaluation  Patient summary reviewed no history of anesthetic complications:   Airway: Mallampati: II        Dental:          Pulmonary:   (+) pneumonia:                            ROS comment: Large 8 cm necrotic mass along the right upper lobe which is not significantly changed in size.  There is increasing necrosis in the mass. Suggest PET scan correlation. Cardiovascular:          ECG reviewed                        Neuro/Psych:               GI/Hepatic/Renal:             Endo/Other:                     Abdominal:             Vascular: Other Findings:             Anesthesia Plan      general and MAC     ASA 2       Induction: intravenous. Anesthetic plan and risks discussed with patient.                       ERICA Enamorado - CRNA   1/7/2022

## 2022-01-07 NOTE — PLAN OF CARE
Nutrition Problem #1: Inadequate oral intake  Intervention: Food and/or Nutrient Delivery: Continue Current Diet,Start Oral Nutrition Supplement  Nutritional Goals: PO intake 50% or greater

## 2022-01-07 NOTE — H&P
HOSPITALISTS HISTORY AND PHYSICAL    1/7/2022 1:12 AM    Patient Information:  Corbin Thomas is a 57 y.o. male [id number]  PCP:  Bala Moran, [address] (Tel: [phone] )    Chief complaint:    Chief Complaint   Patient presents with    Fever     pt states that he had pneumonia 12/12, pt was given antibiotics. pt was negative for covid on 12/12. pt states fevers, headache, congestion, diarrhea, sputum with blood and chills. pt states CT scan on 12/23 states they saw something but unsure of what. pt states tylenol at 1900.  Headache        History of Present Illness:  Natividad Hartman is a 57 y.o. male with history of psoriasis. Patient presents the emergency room for fever, cough, and congestion. Times have been present for approximately 1 month. He he was initially started on Levaquin and steroid taper on 12/14/21. He has no improvement in symptoms and continued to feel worse. He had CT chest outpatient which showed large right 8cm necrotic lung mass concerning for lung cancer. He was refereed to Oncology but has not had appointment yet. He was placed on azthromycin on 12/29/21. He continues to spike fevers with temp 101 yesterday and have productive cough. History obtained from patient       REVIEW OF SYSTEMS:   Review of Systems   Constitutional: Positive for activity change, appetite change and fever. HENT: Positive for congestion. Eyes: Negative. Respiratory: Positive for cough. Negative for shortness of breath. Cardiovascular: Negative. Gastrointestinal: Negative. Endocrine: Negative. Genitourinary: Negative. Musculoskeletal: Negative. Skin: Negative. Neurological: Negative. Hematological: Negative. Psychiatric/Behavioral: Negative. All other systems reviewed and are negative.       Past Medical History:   has a past medical history of BPH (benign prostatic hyperplasia) and Psoriasis. Past Surgical History:   has a past surgical history that includes Esophagus surgery (2019); Foot surgery (2019); Upper gastrointestinal endoscopy; and Colonoscopy. Medications:  No current facility-administered medications on file prior to encounter. Current Outpatient Medications on File Prior to Encounter   Medication Sig Dispense Refill    tamsulosin (FLOMAX) 0.4 MG capsule Take 1 capsule by mouth daily 30 capsule 11    predniSONE (DELTASONE) 10 MG tablet 60 mg day 1(6 tabs), 50 mg day 2 (5 tabs), 40 mg day 3 (4 tabs), 30 mg day 4 (3 tabs), 20 mg day 5 (2 tabs), 10 mg day 6 (1 tab) 21 tablet 0    albuterol sulfate HFA (VENTOLIN HFA) 108 (90 Base) MCG/ACT inhaler Inhale 2 puffs into the lungs 4 times daily as needed for Wheezing 54 g 1    finasteride (PROSCAR) 5 MG tablet Take 1 tablet by mouth daily 30 tablet 11    clobetasol (TEMOVATE) 0.05 % cream Apply topically daily Apply topically once daily 60 g 11    naproxen (NAPROSYN) 500 MG tablet Take 1 tablet by mouth 2 times daily (with meals) 60 tablet 3       Allergies:  No Known Allergies     Social History:  Patient Lives with wife   reports that he has never smoked. He has never used smokeless tobacco. He reports current alcohol use. He reports that he does not use drugs. Family History:  family history includes High Blood Pressure in his mother; High Cholesterol in his mother. ,     Physical Exam:  /65   Pulse 94   Temp 99.8 °F (37.7 °C)   Resp 18   SpO2 96%   Physical Exam  Vitals and nursing note reviewed. Constitutional:       Appearance: Normal appearance. He is normal weight. He is not ill-appearing. HENT:      Head: Normocephalic. Mouth/Throat:      Mouth: Mucous membranes are moist.   Eyes:      Pupils: Pupils are equal, round, and reactive to light. Cardiovascular:      Rate and Rhythm: Normal rate and regular rhythm. Pulses: Normal pulses. Heart sounds:  No murmur heard. Pulmonary:      Effort: Pulmonary effort is normal. No respiratory distress. Breath sounds: Normal breath sounds. Abdominal:      General: Abdomen is flat. Bowel sounds are normal. There is distension. Palpations: Abdomen is soft. Tenderness: There is no abdominal tenderness. Musculoskeletal:         General: Normal range of motion. Cervical back: Normal range of motion. Skin:     General: Skin is warm and dry. Capillary Refill: Capillary refill takes 2 to 3 seconds. Neurological:      General: No focal deficit present. Mental Status: He is alert and oriented to person, place, and time. Cranial Nerves: No cranial nerve deficit. Psychiatric:         Mood and Affect: Mood normal.         Behavior: Behavior normal.         Thought Content: Thought content normal.         Labs:  CBC:   Lab Results   Component Value Date    WBC 18.9 01/06/2022    RBC 4.55 01/06/2022    HGB 13.2 01/06/2022    HCT 40.1 01/06/2022    MCV 88.1 01/06/2022    MCH 29.0 01/06/2022    MCHC 33.0 01/06/2022    RDW 15.1 01/06/2022     01/06/2022    MPV 7.2 01/06/2022     BMP:    Lab Results   Component Value Date     01/06/2022    K 4.1 01/06/2022    CL 94 01/06/2022    CO2 23 01/06/2022    BUN 27 01/06/2022    CREATININE 1.0 01/06/2022    CALCIUM 9.1 01/06/2022    GFRAA >60 01/06/2022    LABGLOM >60 01/06/2022    GLUCOSE 121 01/06/2022     CT CHEST PULMONARY EMBOLISM W CONTRAST   Final Result   No evidence of a pulmonary embolus. Mildly dilated and atherosclerotic thoracic aorta with no aneurysm or   dissection. Large 8 cm necrotic mass along the right upper lobe which is not   significantly changed in size. There is increasing necrosis in the mass. Suggest PET scan correlation.       Enlarging pulmonary nodules in both lungs which probably represents   progressive metastatic disease      Slowly resolving postobstructive infiltrates along the right upper lobe mass      Mildly enlarged right hilar lymph nodes which are unchanged. .  Suggest PET-CT   correlation. CT HEAD WO CONTRAST   Final Result   No acute intracranial abnormality. Probable severe chronic maxillary sinusitis on the left. Recommend follow-up. Chest Xray:   EKG:    I visualized CXR images and EKG strips    Problem List  Active Problems:    * No active hospital problems. *  Resolved Problems:    * No resolved hospital problems. *        Assessment/Plan:   1. Left 8cm Large Necrotic Lung Mass  Be admitted to medical floor with telemetry  Pulmonary consult in morning for lung mass  He denies any hx of tobacco use. Patient is aware however he has not told his wife yet and would like to tell her first.     2. Post Obstructive Pneumonia   IV cefepime and Vancomcyin, he has been on levaquin and azithromcyin with no improvement, continues to have fever, WBC elevated and procal elevated  Blood cx  Sputum cx    3. Sinusitis  Antibiotics as above        DVT prophylaxis Lovenox  Code status Full   Diet NPO  IV access peripheral   Walker Catheter none    Admit as inpatient. I anticipate hospitalization spanning more than two midnights for investigation and treatment of the above medically necessary diagnoses. Please note that some part of this chart was generated using Dragon dictation software. Although every effort was made to ensure the accuracy of this automated transcription, some errors in transcription may have occurred inadvertently. If you may need any clarification, please do not hesitate to contact me through [facility].        ERICA Lizama CNP    1/7/2022 1:12 AM

## 2022-01-07 NOTE — PLAN OF CARE
Problem: Infection:  Goal: Will remain free from infection  Description: Will remain free from infection  Outcome: Ongoing     Problem: Safety:  Goal: Free from accidental physical injury  Description: Free from accidental physical injury  Outcome: Ongoing  Goal: Free from intentional harm  Description: Free from intentional harm  Outcome: Ongoing     Problem: Daily Care:  Goal: Daily care needs are met  Description: Daily care needs are met  Outcome: Ongoing

## 2022-01-07 NOTE — CONSULTS
sinus pain. No hoarseness, or dysphagia. NECK: Patient denies swelling in the neck. CARDIOVASCULAR: Denies chest pain, palpitation, syncope. RESPIRATORY: See above  GASTROINTESTINAL: Denies nausea, abdominal pain or change in bowel function. GENITOURINARY: Denies obstructive symptoms. No history of incontinence. BREASTS: No masses or lumps in the breasts. SKIN: No rashes or itching. MUSCULOSKELETAL: Denies weakness or bone pain. NEUROLOGICAL: No headaches or seizures. PSYCHIATRIC: Denies mood swings or depression. ENDOCRINE: Denies heat or cold intolerance or excessive thirst.  HEMATOLOGIC/LYMPHATIC: Denies easy bruising or lymph node swelling. ALLERGIC/IMMUNOLOGIC: No environmental allergies. PAST MEDICAL HISTORY:   Past Medical History:   Diagnosis Date    BPH (benign prostatic hyperplasia)     Psoriasis        PAST SURGICAL HISTORY:   Past Surgical History:   Procedure Laterality Date    COLONOSCOPY      ESOPHAGUS SURGERY  2019    hernia    FOOT SURGERY  2019    bilateraly hammer toes    UPPER GASTROINTESTINAL ENDOSCOPY         SOCIAL HISTORY:   Social History     Tobacco Use    Smoking status: Never Smoker    Smokeless tobacco: Never Used   Substance Use Topics    Alcohol use: Yes     Comment: occasionally    Drug use: Never       FAMILY HISTORY:   Family History   Problem Relation Age of Onset    High Blood Pressure Mother     High Cholesterol Mother        MEDICATIONS:     No current facility-administered medications on file prior to encounter.      Current Outpatient Medications on File Prior to Encounter   Medication Sig Dispense Refill    albuterol sulfate HFA (VENTOLIN HFA) 108 (90 Base) MCG/ACT inhaler Inhale 2 puffs into the lungs 4 times daily as needed for Wheezing 54 g 1    tamsulosin (FLOMAX) 0.4 MG capsule Take 1 capsule by mouth daily 30 capsule 11    naproxen (NAPROSYN) 500 MG tablet Take 1 tablet by mouth 2 times daily (with meals) 60 tablet 3    predniSONE PSYCHIATRIC EVALUATION: Normal affect. Patient answers questions appropriately. HEMATOLOGIC/LYMPHATIC/ IMMUNOLOGIC: No palpable lymphadenopathy. NEUROLOGIC: Alert and oriented x 3. Groslly non-focal. Motor strength is 5+/5 in all muscle groups. The patient has a normal sensorium globally. LABS:  Lab Results   Component Value Date    WBC 18.9 (H) 01/06/2022    HGB 13.2 (L) 01/06/2022    HCT 40.1 (L) 01/06/2022     01/06/2022    ALT 19 01/06/2022    AST 27 01/06/2022     (L) 01/06/2022    K 4.1 01/06/2022    CL 94 (L) 01/06/2022    CREATININE 1.0 01/06/2022    BUN 27 (H) 01/06/2022    CO2 23 01/06/2022    INR 1.34 (H) 01/06/2022       Lab Results   Component Value Date    GLUCOSE 121 (H) 01/06/2022    CALCIUM 9.1 01/06/2022     (L) 01/06/2022    K 4.1 01/06/2022    CO2 23 01/06/2022    CL 94 (L) 01/06/2022    BUN 27 (H) 01/06/2022    CREATININE 1.0 01/06/2022       IMPRESSION:   Large right upper lobe necrotic mass  Postobstructive pneumonia  Lung nodules  History of psoriasis      RECOMMENDATION:   Patient CT chest shows large right upper lobe necrotic mass with some element of postobstructive pneumonia. Bilateral other lung nodules are also present along with mild right hilar lymphadenopathy. This mass has been present since 12/23/21 and is now necrotic. Patient has failed to outpatient antibiotics. Even though he is a non-smoker, CT findings are highly suggestive of malignancy. Patient needs bronchoscopy with biopsy of right upper lobe mass along with endobronchial ultrasound-guided FNA of hilar lymph node. Unfortunately, there is no room for bronchoscopy today and we will have to push the bronchoscopy on Monday. Keep n.p.o. past Sunday midnight. Patient has failed two outpatient antibiotics. He has elevated procalcitonin and leukocytosis. Likely some postobstructive pneumonia component. Currently on vancomycin and cefepime. Obtain sputum culture and MRSA nares.   De-escalate antibiotics based on cultures. Thank you for your consultation. We will continue to follow the patient. Nanci Lobo MD  Pulmonary Critical Care and Sleep Medicine  1/7/2022, 11:25 AM    This note was completed using dragon medical speech recognition software. Grammatical errors, random word insertions, pronoun errors and incomplete sentences are occasional consequences of this technology due to software limitations. If there are questions or concerns about the content of this note of information contained within the body of this dictation they should be addressed with the provider for clarification.

## 2022-01-07 NOTE — PLAN OF CARE
Patient seen and examined by one of her partners earlier today.   I agree with their assessment and plan and will continue to follow while in the hospital.    Pulmonology and oncology on board: Plan for [facility] on Monday     Zay Uribe MD

## 2022-01-07 NOTE — PROGRESS NOTES
Comprehensive Nutrition Assessment    Type and Reason for Visit:  Initial,Positive Nutrition Screen    Nutrition Recommendations/Plan:   Ensure Enlive BID    Nutrition Assessment:  Pt admitted with c/o fever, headach and cough. Nutrition assessment triggered for admission MST of 2, indicating wt loss and poor po intake. Overall decrease in appetite and intake r/t recent dx of pneumonia. Regular diet ordered upon admission, will begin Ensure Enlive for additional nutrition. CBW listed as 263lb, unknown if actual or stated. Wt records show pt was 272 lb back in August.  Will follow for actual CBW. Malnutrition Assessment:  Malnutrition Status:  No malnutrition        Estimated Daily Nutrient Needs:  Energy (kcal):  8885 - 2151; Weight Used for Energy Requirements:  Current (119.5kg)     Protein (g):  103 - 129; Weight Used for Protein Requirements:  Ideal (1.2 - 1.5g/86kg)        Fluid (ml/day):    1 ml/kcal      Nutrition Related Findings:  recent dx of lung mass; missing teeth; LR at 100 mL/hr      Wounds:  None       Current Nutrition Therapies:    ADULT DIET; Regular    Anthropometric Measures:  · Height: 6' 2\" (188 cm)  · Current Body Weight: 263 lb (119.3 kg)   · Ideal Body Weight: 190 lbs; % Ideal Body Weight 138.4 %   · BMI: 33.8  · BMI Categories: Obese Class 1 (BMI 30.0-34. 9)       Nutrition Diagnosis:   · Inadequate oral intake related to catabolic illness (pneumonia) as evidenced by poor intake prior to admission      Nutrition Interventions:   Food and/or Nutrient Delivery:  Continue Current Diet,Start Oral Nutrition Supplement  Nutrition Education/Counseling:  Education not indicated   Coordination of Nutrition Care:  Continue to monitor while inpatient    Goals:  PO intake 50% or greater       Nutrition Monitoring and Evaluation:   Behavioral-Environmental Outcomes:      Food/Nutrient Intake Outcomes:  Food and Nutrient Intake,Supplement Intake,IVF Intake  Physical Signs/Symptoms Outcomes: Weight     Discharge Planning:     Too soon to determine     Electronically signed by Natasha Mauro RD, LD on 1/7/22 at 11:20 AM EST  Contact: [phone]

## 2022-01-07 NOTE — ED PROVIDER NOTES
[facility]        Pt Name: Elian Ramirez  MRN: [id number]  [place] 1960  Date of evaluation: 1/6/2022  Provider: Pravin Barr PA-C  PCP: WESLY Calhoun  Note Started: 12:36 AM EST       SOPHIA. I have evaluated this patient. My supervising physician was available for consultation. CHIEF COMPLAINT       Chief Complaint   Patient presents with    Fever     pt states that he had pneumonia 12/12, pt was given antibiotics. pt was negative for covid on 12/12. pt states fevers, headache, congestion, diarrhea, sputum with blood and chills. pt states CT scan on 12/23 states they saw something but unsure of what. pt states tylenol at 1900.  Headache       HISTORY OF PRESENT ILLNESS   (Location, Timing/Onset, Context/Setting, Quality, Duration, Modifying Factors, Severity, Associated Signs and Symptoms)  Note limiting factors. Chief Complaint: Fever, headache    Elian Ramirez is a 57 y.o. male who presents to the emergency department today for evaluation for fever, headache, and cough. The patient states that the symptoms have actually been ongoing for approximately 1 month. The patient states his symptoms started with a cough, and chest congestion. The patient states that he was seen by his primary care physician, he states that he had Covid swab which was negative, was told that he had pneumonia and placed on Levaquin. The patient states that he was diagnosed with this on 12/12/2021. .  The patient states that he is finished Levaquin dose x2. The patient states he actually saw his pulmonologist at the end of last month,on a Z-Lam which he has since finished. The patient states he continues to have fever, cough, chest congestion, headaches, generally not feel well. The patient did have a outpatient CT performed at the end of last month which did show a 8 cm mass in the lung.   He states that they have seen an oncologist however they are unable to start chemotherapy or any other treatments until he is able to be evaluated by pulmonologist.  He states that he is scheduled to have a bronchoscopy performed at the end of the month. The wife states that they were concerned that the patient continues to have a fever of 101 at home, he does have a low-grade fever of 99.8 when he arrives here and he has been taking Tylenol. Patient states he does continue to cough, although the cough is now dry. Patient states that he has had some wheezing, he has been using his inhaler without much improvement. Patient has not had any abdominal pain, nausea, or vomiting. He has had several episodes of diarrhea which is attributing this to the antibiotics he denies any urinary symptoms. Patient otherwise has no complaints    Nursing Notes were all reviewed and agreed with or any disagreements were addressed in the HPI. REVIEW OF SYSTEMS    (2-9 systems for level 4, 10 or more for level 5)     Review of Systems   Constitutional: Positive for fatigue and fever. Negative for activity change, appetite change and chills. HENT: Positive for congestion. Negative for rhinorrhea. Respiratory: Positive for cough, shortness of breath and wheezing. Cardiovascular: Negative for chest pain. Gastrointestinal: Negative for abdominal pain, diarrhea, nausea and vomiting. Genitourinary: Negative for difficulty urinating, dysuria and hematuria. Neurological: Negative for weakness. Positives and Pertinent negatives as per HPI. Except as noted above in the ROS, all other systems were reviewed and negative.        PAST MEDICAL HISTORY     Past Medical History:   Diagnosis Date    BPH (benign prostatic hyperplasia)     Psoriasis          SURGICAL HISTORY     Past Surgical History:   Procedure Laterality Date    COLONOSCOPY      ESOPHAGUS SURGERY  2019    hernia    FOOT SURGERY  2019    bilateraly hammer toes    UPPER GASTROINTESTINAL ENDOSCOPY CURRENTMEDICATIONS       Previous Medications    ALBUTEROL SULFATE HFA (VENTOLIN HFA) 108 (90 BASE) MCG/ACT INHALER    Inhale 2 puffs into the lungs 4 times daily as needed for Wheezing    CLOBETASOL (TEMOVATE) 0.05 % CREAM    Apply topically daily Apply topically once daily    FINASTERIDE (PROSCAR) 5 MG TABLET    Take 1 tablet by mouth daily    NAPROXEN (NAPROSYN) 500 MG TABLET    Take 1 tablet by mouth 2 times daily (with meals)    PREDNISONE (DELTASONE) 10 MG TABLET    60 mg day 1(6 tabs), 50 mg day 2 (5 tabs), 40 mg day 3 (4 tabs), 30 mg day 4 (3 tabs), 20 mg day 5 (2 tabs), 10 mg day 6 (1 tab)    TAMSULOSIN (FLOMAX) 0.4 MG CAPSULE    Take 1 capsule by mouth daily         ALLERGIES     Patient has no known allergies. FAMILYHISTORY       Family History   Problem Relation Age of Onset    High Blood Pressure Mother     High Cholesterol Mother           SOCIAL HISTORY       Social History     Tobacco Use    Smoking status: Never Smoker    Smokeless tobacco: Never Used   Substance Use Topics    Alcohol use: Yes     Comment: occasionally    Drug use: Never       SCREENINGS             PHYSICAL EXAM    (up to 7 for level 4, 8 or more for level 5)     ED Triage Vitals   BP Temp Temp Source Pulse Resp SpO2 Height Weight   01/06/22 2149 01/06/22 2153 01/06/22 2149 01/06/22 2149 01/06/22 2149 01/06/22 2149 -- --   115/65 99.8 °F (37.7 °C) Oral 94 18 95 %         Physical Exam  Vitals and nursing note reviewed. Constitutional:       Appearance: He is well-developed. He is not diaphoretic. HENT:      Head: Normocephalic and atraumatic. Right Ear: External ear normal.      Left Ear: External ear normal.      Nose: Nose normal.   Eyes:      General:         Right eye: No discharge. Left eye: No discharge. Neck:      Trachea: No tracheal deviation. Cardiovascular:      Rate and Rhythm: Normal rate and regular rhythm.    Pulmonary:      Effort: Pulmonary effort is normal. No respiratory distress. Breath sounds: Wheezing present. Comments: Scattered wheezing throughout all lung fields  Abdominal:      General: Bowel sounds are normal. There is no distension. Palpations: Abdomen is soft. Tenderness: There is no abdominal tenderness. There is no guarding. Musculoskeletal:         General: Normal range of motion. Cervical back: Normal range of motion and neck supple. Skin:     General: Skin is warm and dry. Neurological:      General: No focal deficit present. Mental Status: He is alert and oriented to person, place, and time.    Psychiatric:         Behavior: Behavior normal.         DIAGNOSTIC RESULTS   LABS:    Labs Reviewed   CBC WITH AUTO DIFFERENTIAL - Abnormal; Notable for the following components:       Result Value    WBC 18.9 (*)     Hemoglobin 13.2 (*)     Hematocrit 40.1 (*)     Neutrophils Absolute 14.0 (*)     Monocytes Absolute 2.8 (*)     Bands Relative 9 (*)     Metamyelocytes Relative 1 (*)     Toxic Granulation Present (*)     All other components within normal limits    Narrative:     Performed at:  [facility] [address], [place], [address]   Phone [phone]   COMPREHENSIVE METABOLIC PANEL - Abnormal; Notable for the following components:    Sodium 130 (*)     Chloride 94 (*)     Glucose 121 (*)     BUN 27 (*)     Albumin 2.9 (*)     Albumin/Globulin Ratio 0.6 (*)     All other components within normal limits    Narrative:     Performed at:  [facility] [address], [place], [address]   Phone [phone]   URINE RT REFLEX TO CULTURE - Abnormal; Notable for the following components:    Blood, Urine SMALL (*)     Protein,  (*)     All other components within normal limits    Narrative:     Performed at:  [facility] [address], [place], [address]   Phone [phone]   PROTIME-INR - Abnormal; Notable for the following components:    Protime 15.3 (*)     INR 1.34 (*)     All other components within normal limits    Narrative:     Performed at:  [facility] [address] [place], [address]   Phone [phone]   PROCALCITONIN - Abnormal; Notable for the following components:    Procalcitonin 0.88 (*)     All other components within normal limits    Narrative:     Performed at:  [facility] [address] [place] [address], [address]   Phone [phone]   SEDIMENTATION RATE - Abnormal; Notable for the following components:    Sed Rate 98 (*)     All other components within normal limits    Narrative:     Performed at:  [facility] [address], [place]   Phone [phone]   C-REACTIVE PROTEIN - Abnormal; Notable for the following components:    .5 (*)     All other components within normal limits    Narrative:     Performed at:  [facility] [address], [place]   Phone [phone]   CULTURE, BLOOD 2   CULTURE, BLOOD 1   LIPASE    Narrative:     Performed at:  [facility] [address], [place]   Phone [phone]   TROPONIN    Narrative:     Performed at:  [facility] [address] [place] [address], [address]   Phone [phone]   APTT    Narrative:     Performed at:  [facility] [address], [place]   Phone [phone]   LACTATE, SEPSIS    Narrative:     Performed at:  [facility] [address], [address]   Phone [phone]   [address] [place] [address],[address]       When ordered only abnormal lab results are displayed. All other labs were within normal range or not returned as of this dictation. EKG:  When ordered, EKG's are interpreted by the Emergency Department Physician in the absence of a cardiologist.  Please see their note for interpretation of EKG. RADIOLOGY:   Non-plain film images such as CT, Ultrasound and MRI are read by the radiologist. Plain radiographic images are visualized and preliminarily interpreted by the ED Provider with the below findings:        Interpretation per the Radiologist below, if available at the time of this note:    CT CHEST PULMONARY EMBOLISM W CONTRAST   Final Result   No evidence of a pulmonary embolus. Mildly dilated and atherosclerotic thoracic aorta with no aneurysm or   dissection. Large 8 cm necrotic mass along the right upper lobe which is not   significantly changed in size. There is increasing necrosis in the mass. Suggest PET scan correlation. Enlarging pulmonary nodules in both lungs which probably represents   progressive metastatic disease      Slowly resolving postobstructive infiltrates along the right upper lobe mass      Mildly enlarged right hilar lymph nodes which are unchanged. .  Suggest PET-CT   correlation. CT HEAD WO CONTRAST   Final Result   No acute intracranial abnormality. Probable severe chronic maxillary sinusitis on the left. Recommend follow-up. CT HEAD WO CONTRAST    Result Date: 1/7/2022  EXAMINATION: CT OF THE HEAD WITHOUT CONTRAST  1/6/2022 10:27 pm TECHNIQUE: CT of the head was performed without the administration of intravenous contrast. Dose modulation, iterative reconstruction, and/or weight based adjustment of the mA/kV was utilized to reduce the radiation dose to as low as reasonably achievable. COMPARISON: None. HISTORY: ORDERING SYSTEM PROVIDED HISTORY: headache TECHNOLOGIST PROVIDED HISTORY: Reason for exam:->headache Has a \"code stroke\" or \"stroke alert\" been called? ->No Decision Support Exception - unselect if not a suspected or confirmed emergency medical condition->Emergency Medical Condition (MA) Reason for Exam: headache Relevant Medical/Surgical History: Fever (pt states that he had pneumonia 12/12, pt was given antibiotics. pt was negative for covid on 12/12. pt states fevers, headache, congestion, diarrhea, sputum with blood and chills. pt states CT scan on 12/23 states they saw something but unsure of what. pt states tylenol at 1900. ) FINDINGS: BRAIN/VENTRICLES: There is no acute intracranial hemorrhage, mass effect or midline shift. No abnormal extra-axial fluid collection. The gray-white differentiation is maintained without evidence of an acute infarct. There is no evidence of hydrocephalus. ORBITS: The visualized portion of the orbits demonstrate no acute abnormality. SINUSES: There is moderate mucosal thickening filling the visualized left maxillary sinus. The mastoid air cells demonstrate no acute abnormality. SOFT TISSUES/SKULL:  No acute abnormality of the visualized skull or soft tissues. No acute intracranial abnormality. Probable severe chronic maxillary sinusitis on the left. Recommend follow-up. CT CHEST PULMONARY EMBOLISM W CONTRAST    Result Date: 1/7/2022  EXAMINATION: CTA OF THE CHEST 1/6/2022 11:24 pm TECHNIQUE: CTA of the chest was performed after the administration of intravenous contrast.  Multiplanar reformatted images are provided for review. MIP images are provided for review. Dose modulation, iterative reconstruction, and/or weight based adjustment of the mA/kV was utilized to reduce the radiation dose to as low as reasonably achievable. COMPARISON: 12/23/2021 HISTORY: ORDERING SYSTEM PROVIDED HISTORY: r/o pe TECHNOLOGIST PROVIDED HISTORY: Reason for exam:->r/o pe Decision Support Exception - unselect if not a suspected or confirmed emergency medical condition->Emergency Medical Condition (MA) Reason for Exam: r/o pe Relevant Medical/Surgical History: Fever (pt states that he had pneumonia 12/12, pt was given antibiotics. pt was negative for covid on 12/12.  pt states fevers, headache, congestion, diarrhea, sputum with blood and chills. pt states CT scan on 12/23 states they saw something but unsure of what. pt states tylenol at 1900. ) FINDINGS: Pulmonary Arteries: Pulmonary arteries are adequately opacified for evaluation. No evidence of intraluminal filling defect to suggest pulmonary embolism. Main pulmonary artery is normal in caliber. Mediastinum: There are several small right hilar lymph nodes measuring up to 1.2 cm which are unchanged. The heart and pericardium demonstrate no acute abnormality. There is no acute abnormality of the thoracic aorta. Lungs/pleura: There is a large 8 x 7 x 7 cm cavitary mass along the right upper lobe. This measured 8 x 8 cm previously. There is increased necrosis in the mass. There is some hazy parenchymal opacity surrounding the mass extending inferiorly which is slightly less prominent. There is an irregular 9 mm nodule along the right upper lobe anteriorly extending inferiorly which measured 6 mm previously. There are some satellite nodules extending inferiorly which are more prominent. There are some tiny reticulonodular densities inferiorly just below the mass which is more prominent. There is a 5 mm subpleural nodule right lower lobe which is unchanged. There is a 6 mm nodule along along the lingula inferiorly which is more prominent. No effusion is seen. Upper Abdomen: Limited images of the upper abdomen are unremarkable. Soft Tissues/Bones: No acute bone or soft tissue abnormality. No evidence of a pulmonary embolus. Mildly dilated and atherosclerotic thoracic aorta with no aneurysm or dissection. Large 8 cm necrotic mass along the right upper lobe which is not significantly changed in size. There is increasing necrosis in the mass. Suggest PET scan correlation.  Enlarging pulmonary nodules in both lungs which probably represents progressive metastatic disease Slowly resolving postobstructive infiltrates along the right upper there is increasing necrosis. He has enlarged pulmonary nodules in both lung likely progressive metastatic disease. He does have postobstructive infiltrates. Due to the patient's elevated white blood cell count and procalcitonin, I feel that admission is warranted at this time as he has failed multiple antibiotics as an outpatient. Patient will be covered with*mycin and cefepime. Patient is updated and agreeable with plan. Stable for admission      FINAL IMPRESSION      1. Pneumonia due to infectious organism, unspecified laterality, unspecified part of lung    2. Lung mass    3. Failure of outpatient treatment          DISPOSITION/PLAN   DISPOSITION Decision To Admit 01/07/2022 12:48:43 AM      PATIENT REFERRED TO:  No follow-up provider specified.     DISCHARGE MEDICATIONS:  New Prescriptions    No medications on file       DISCONTINUED MEDICATIONS:  Discontinued Medications    No medications on file              (Please note that portions of this note were completed with a voice recognition program.  Efforts were made to edit the dictations but occasionally words are mis-transcribed.)    Epifanio Godoy PA-C (electronically signed)            Epifanio Godoy PA-C  01/07/22 0789

## 2022-01-07 NOTE — CONSULTS
[facility]   CONSULT NOTE    1/7/2022 10:21 AM    Patient Information: Wilber Medina   Date of Admit:  1/6/2022  Primary Care Physician:  WESLY Calhoun  Requesting Physician:  Ayesha Scott MD    Reason for consult:   Evaluation and recommendations for lung mass    Chief complaint:    Chief Complaint   Patient presents with    Fever     pt states that he had pneumonia 12/12, pt was given antibiotics. pt was negative for covid on 12/12. pt states fevers, headache, congestion, diarrhea, sputum with blood and chills. pt states CT scan on 12/23 states they saw something but unsure of what. pt states tylenol at 1900. Headache       History of Present Illness:  Wilber Medina is a 57 y.o. male on Ayesha Scott MD service who was admitted on 1/6/2022 for fevers and cough. He reports coughing up small amounts of blood. He has had these symptoms for the past month. He was initially treated with Levaquin and steroids on 12/14/21. He has a known 8 cm necrotic lung mass. He was seen by Dr. Jose Antonio Skelton in the office on 1/3/21. He was referred to pulmonology for further work up. CTPA yesterday showed stable 8 cm necrotic mass in RUL, and enlarging pulmonary nodules in both lungs concerning for metastatic disease, postobstructive infiltrates along RUL mass. Past Medical History:     has a past medical history of BPH (benign prostatic hyperplasia) and Psoriasis.      Past Surgical History:    Past Surgical History:   Procedure Laterality Date    COLONOSCOPY      ESOPHAGUS SURGERY  2019    hernia    FOOT SURGERY  2019    bilateraly hammer toes    UPPER GASTROINTESTINAL ENDOSCOPY          Current Medications:     finasteride  5 mg Oral Daily    tamsulosin  0.4 mg Oral Daily    clobetasol   Topical Daily    sodium chloride flush  5-40 mL IntraVENous 2 times per day    [START ON 1/8/2022] enoxaparin  40 mg SubCUTAneous Daily    cefepime  2,000 mg IntraVENous Q12H    vancomycin  1,000 mg IntraVENous Q12H Allergies:    No Known Allergies     Social History:    reports that he has never smoked. He has never used smokeless tobacco. He reports current alcohol use. He reports that he does not use drugs. Family History:     family history includes High Blood Pressure in his mother; High Cholesterol in his mother. ROS:    Constitutional:  Negative for fever, chills or night sweats  Eyes:  Negative for exudate, itching  Ears:  Negative for drainage   Nose:  Negative for rhinorrhea, epistaxis  Mouth/Throat:  Negative for hoarseness, sore throat. Respiratory:   Negative for shortness of breath, hemoptysis, wheezing  Cardiovascular: Negative for chest pain, palpitations   Gastrointestinal:  Negative for nausea, vomiting, diarrhea, black stool, bright red blood in the stool  Genitourinary:  Negative for dysuria, hematuria   Hematologic/Lymphatic:  Negative for  bleeding tendency, easy bruising  Musculoskeletal:  Negative for myalgias, arthralgias  Neurologic:  Negative for  confusion,dysarthria. Skin :  Negative for itching, rash  Psychiatric:  Negative for depression, anxiety. Endocrine:  Negative for polydipsia, polyuria, heat /cold intolerance. PHYSICAL EXAM:    Vitals:  Vitals:    01/07/22 0741   BP: 131/68   Pulse: 96   Resp: 20   Temp: 100 °F (37.8 °C)   SpO2: 95%      No intake or output data in the 24 hours ending 01/07/22 1021   Wt Readings from Last 3 Encounters:   01/07/22 263 lb 6.4 oz (119.5 kg)   08/24/21 272 lb 12.8 oz (123.7 kg)        General appearance: Appears comfortable. Well nourished  Eyes: Sclera clear, pupils equal  ENT: Moist mucus membranes, no thrush  Neck: Trachea midline, symmetrical  Cardiovascular: Regular rhythm, normal S1, S2. No murmur, gallop, rub. No edema in  lower extremities  Respiratory: Clear to auscultation bilaterally. No wheeze.  Good inspiratory effort  Gastrointestinal: Abdomen soft, not tender, not distended, normal bowel sounds  Musculoskeletal: No cyanosis in digits, warm extremities  Neurologic: Cranial nerves grossly intact, no motor or speech deficits. Psychiatric: Normal affect. Alert and oriented to time, place and person. Skin: Warm, dry, normal turgor, no rash    DATA:  CBC:   Lab Results   Component Value Date    WBC 18.9 01/06/2022    RBC 4.55 01/06/2022    HGB 13.2 01/06/2022    HCT 40.1 01/06/2022    MCV 88.1 01/06/2022    MCH 29.0 01/06/2022    MCHC 33.0 01/06/2022    RDW 15.1 01/06/2022     01/06/2022    MPV 7.2 01/06/2022     BMP:  Lab Results   Component Value Date     01/06/2022    K 4.1 01/06/2022    CL 94 01/06/2022    CO2 23 01/06/2022    BUN 27 01/06/2022    CREATININE 1.0 01/06/2022    CALCIUM 9.1 01/06/2022    GFRAA >60 01/06/2022    LABGLOM >60 01/06/2022    GLUCOSE 121 01/06/2022     Magnesium: No results found for: MG  LIVER PROFILE:   Recent Labs     01/06/22  2211   AST 27   ALT 19   LIPASE 30.0   BILITOT 0.3   ALKPHOS 57     PT/INR:    Lab Results   Component Value Date    PROTIME 15.3 01/06/2022    INR 1.34 01/06/2022       IMPRESSION/RECOMMENDATIONS:    Active Problems:    Pneumonia  Resolved Problems:    * No resolved hospital problems. *       Lung mass  - large 8 cm necrotic mass RUL with enlarging nodules in both lungs  - pulmonary consult pending, tentatively schedule for bronchoscopy on 1/10/22  - await pathology results     Post obstructive pneumonia  - Blood and sputum cultures pending  - receiving vancomycin and cefepime    This plan was discussed with the patient and he/she verbalized understanding. Thank you for allowing us to participate in the care of this patient. Joseph Nugent CNP  [facility]    Patient was seen. Initially he was seen in the clinic a few days ago. Obstructive lung mass and he was scheduled for bronchoscopy and biopsy. Admitted for postobstructive pneumonia. Feeling better. Persistent cough. Will have bronchoscopy on Monday.      Charlie Hutchins MD

## 2022-01-08 LAB
A/G RATIO: 0.7 (ref 1.1–2.2)
ALBUMIN SERPL-MCNC: 2.8 G/DL (ref 3.4–5)
ALP BLD-CCNC: 48 U/L (ref 40–129)
ALT SERPL-CCNC: 15 U/L (ref 10–40)
ANION GAP SERPL CALCULATED.3IONS-SCNC: 9 MMOL/L (ref 3–16)
AST SERPL-CCNC: 20 U/L (ref 15–37)
BASOPHILS ABSOLUTE: 0 K/UL (ref 0–0.2)
BASOPHILS RELATIVE PERCENT: 0.1 %
BILIRUB SERPL-MCNC: 0.3 MG/DL (ref 0–1)
BUN BLDV-MCNC: 17 MG/DL (ref 7–20)
CALCIUM SERPL-MCNC: 8.9 MG/DL (ref 8.3–10.6)
CHLORIDE BLD-SCNC: 97 MMOL/L (ref 99–110)
CO2: 26 MMOL/L (ref 21–32)
CREAT SERPL-MCNC: 0.6 MG/DL (ref 0.8–1.3)
EOSINOPHILS ABSOLUTE: 0 K/UL (ref 0–0.6)
EOSINOPHILS RELATIVE PERCENT: 0 %
GFR AFRICAN AMERICAN: >60
GFR NON-AFRICAN AMERICAN: >60
GLUCOSE BLD-MCNC: 118 MG/DL (ref 70–99)
HCT VFR BLD CALC: 35.5 % (ref 40.5–52.5)
HEMOGLOBIN: 11.7 G/DL (ref 13.5–17.5)
LYMPHOCYTES ABSOLUTE: 2.1 K/UL (ref 1–5.1)
LYMPHOCYTES RELATIVE PERCENT: 18.2 %
MCH RBC QN AUTO: 28.8 PG (ref 26–34)
MCHC RBC AUTO-ENTMCNC: 32.9 G/DL (ref 31–36)
MCV RBC AUTO: 87.6 FL (ref 80–100)
MONOCYTES ABSOLUTE: 1.4 K/UL (ref 0–1.3)
MONOCYTES RELATIVE PERCENT: 12.3 %
MRSA SCREEN RT-PCR: ABNORMAL
NEUTROPHILS ABSOLUTE: 7.9 K/UL (ref 1.7–7.7)
NEUTROPHILS RELATIVE PERCENT: 69.4 %
ORGANISM: ABNORMAL
PDW BLD-RTO: 14.9 % (ref 12.4–15.4)
PLATELET # BLD: 295 K/UL (ref 135–450)
PMV BLD AUTO: 6.9 FL (ref 5–10.5)
POTASSIUM REFLEX MAGNESIUM: 3.6 MMOL/L (ref 3.5–5.1)
RBC # BLD: 4.05 M/UL (ref 4.2–5.9)
SODIUM BLD-SCNC: 132 MMOL/L (ref 136–145)
TOTAL PROTEIN: 6.6 G/DL (ref 6.4–8.2)
VANCOMYCIN RANDOM: 4.6 UG/ML
VANCOMYCIN TROUGH: 5.2 UG/ML (ref 10–20)
WBC # BLD: 11.4 K/UL (ref 4–11)

## 2022-01-08 PROCEDURE — 2580000003 HC RX 258: Performed by: NURSE PRACTITIONER

## 2022-01-08 PROCEDURE — 36415 COLL VENOUS BLD VENIPUNCTURE: CPT

## 2022-01-08 PROCEDURE — 2060000000 HC ICU INTERMEDIATE R&B

## 2022-01-08 PROCEDURE — 6360000002 HC RX W HCPCS: Performed by: INTERNAL MEDICINE

## 2022-01-08 PROCEDURE — 2580000003 HC RX 258: Performed by: INTERNAL MEDICINE

## 2022-01-08 PROCEDURE — 80053 COMPREHEN METABOLIC PANEL: CPT

## 2022-01-08 PROCEDURE — 6370000000 HC RX 637 (ALT 250 FOR IP): Performed by: NURSE PRACTITIONER

## 2022-01-08 PROCEDURE — 6360000002 HC RX W HCPCS: Performed by: NURSE PRACTITIONER

## 2022-01-08 PROCEDURE — 85025 COMPLETE CBC W/AUTO DIFF WBC: CPT

## 2022-01-08 PROCEDURE — 80202 ASSAY OF VANCOMYCIN: CPT

## 2022-01-08 RX ORDER — CALCIUM CARBONATE 200(500)MG
500 TABLET,CHEWABLE ORAL 3 TIMES DAILY PRN
Status: DISCONTINUED | OUTPATIENT
Start: 2022-01-08 | End: 2022-01-10 | Stop reason: HOSPADM

## 2022-01-08 RX ADMIN — TAMSULOSIN HYDROCHLORIDE 0.4 MG: 0.4 CAPSULE ORAL at 10:22

## 2022-01-08 RX ADMIN — SODIUM CHLORIDE 25 ML: 9 INJECTION, SOLUTION INTRAVENOUS at 13:06

## 2022-01-08 RX ADMIN — VANCOMYCIN HYDROCHLORIDE 1500 MG: 10 INJECTION, POWDER, LYOPHILIZED, FOR SOLUTION INTRAVENOUS at 17:45

## 2022-01-08 RX ADMIN — CEFEPIME HYDROCHLORIDE 2000 MG: 2 INJECTION, POWDER, FOR SOLUTION INTRAVENOUS at 01:16

## 2022-01-08 RX ADMIN — SODIUM CHLORIDE, POTASSIUM CHLORIDE, SODIUM LACTATE AND CALCIUM CHLORIDE: 600; 310; 30; 20 INJECTION, SOLUTION INTRAVENOUS at 01:03

## 2022-01-08 RX ADMIN — FINASTERIDE 5 MG: 5 TABLET, FILM COATED ORAL at 10:22

## 2022-01-08 RX ADMIN — ANTACID TABLETS 500 MG: 500 TABLET, CHEWABLE ORAL at 01:18

## 2022-01-08 RX ADMIN — Medication 5 ML: at 21:50

## 2022-01-08 RX ADMIN — CEFEPIME HYDROCHLORIDE 2000 MG: 2 INJECTION, POWDER, FOR SOLUTION INTRAVENOUS at 13:07

## 2022-01-08 RX ADMIN — VANCOMYCIN HYDROCHLORIDE 1000 MG: 1 INJECTION, POWDER, LYOPHILIZED, FOR SOLUTION INTRAVENOUS at 05:29

## 2022-01-08 RX ADMIN — ENOXAPARIN SODIUM 40 MG: 40 INJECTION SUBCUTANEOUS at 10:22

## 2022-01-08 ASSESSMENT — PAIN SCALES - GENERAL
PAINLEVEL_OUTOF10: 0

## 2022-01-08 NOTE — PROGRESS NOTES
Assessment complete. VSS no SOB or any distress noted. Pt resting in bed with no complaints at this time. Lung sounds clear and diminished. POC discussed and agreed upon. Call light within reach, pt encouraged to call if any needs arise. No further requests at this time. Will continue to monitor.

## 2022-01-08 NOTE — PROGRESS NOTES
Physician Progress Note      PATIENT:               Awa Burnett  [facility] #:                  [id number]  :                       1960  ADMIT DATE:       2022 9:44 PM  [address] [place] DATE:  RESPONDING  PROVIDER #:        Zay Uribe MD          QUERY TEXT:    Pt admitted with Right Upper Lobe Necrotic Lung Mass and Obstructive   Pneumonia. Pt noted to have leukocytosis, febrile, tachycardia, elevated   Procalcitonin. If possible, please document in the progress notes and   discharge summary if you are evaluating and /or treating any of the following: The medical record reflects the following:  Risk Factors: RUL Necrotic Lung Mass, Obstructive Pneumonia  Clinical Indicators: Labs on admission WBC 18.9 Bands 9 Procalcitonin 0.88    Vital Signs in the ED Oral Temp 99.8 HR 92-94  On admission to unit 22   Oral Temp 99.5 HR 96 by 0741 Oral Temp 100 HR 96. Treatment: Oncology/pulmonary consults, CT Head/Chest/PE, iv NS Bolus, ivf LR,   iv Maxipime, iv Vancomycin, monitor labs  Options provided:  -- Sepsis, present on admission  -- Sepsis, not present on admission  -- Right Upper Lung Necrotic Lung mass and Obstructive Pneumonia without   Sepsis  -- Sepsis was ruled out  -- Other - I will add my own diagnosis  -- Disagree - Not applicable / Not valid  -- Disagree - Clinically unable to determine / Unknown  -- Refer to Clinical Documentation Reviewer    PROVIDER RESPONSE TEXT:    This patient has sepsis which was present on admission.     Query created by: Dominique Aguilar on 2022 7:17 AM      Electronically signed by:  Zay Uribe MD 2022 12:44 PM

## 2022-01-08 NOTE — PROGRESS NOTES
Pt c/o heartburn, TUMS requested, NP messaged, order given and med administered. Pt VU to  Inform if relief was not achieved. Will continue to monitor.

## 2022-01-08 NOTE — PROGRESS NOTES
Clinical Pharmacy Note: Pharmacy to Dose Vancomcyin    Vancomycin Day: 2  Current Dosing Regimen: 1000 mg q12  Dosing Method: Bayesian Modeling    Random: 4.6    Recent Labs     01/07/22  1652 01/08/22  0407   BUN 20 17       Recent Labs     01/07/22  1652 01/08/22  0407   CREATININE 0.7* 0.6*       Recent Labs     01/06/22  2211 01/08/22  0407   WBC 18.9* 11.4*       No intake or output data in the 24 hours ending 01/08/22 1039      Ht Readings from Last 1 Encounters:   01/07/22 6' 2\" (1.88 m)        Wt Readings from Last 1 Encounters:   01/08/22 261 lb 12.8 oz (118.8 kg)         Body mass index is 33.61 kg/m². Estimated Creatinine Clearance: 175 mL/min (A) (based on SCr of 0.6 mg/dL (L)). Assessment/Plan:  Vancomycin level is subtherapeutic. Level was drawn appropriately in respect to last dose given. Increase vancomycin regimen to 1500 mg every 12 hours. Bayesian Modeling predicts an AUC of 492 mg/L*hr and trough of 15.9 mg/L. No follow up levels ordered at this time. Changes in regimen will be determined based on culture results, renal function, and clinical response. Pharmacy will continue to monitor and adjust regimen as necessary.     Thank you for the consult,    Malcolm Landaverde, PharmD, [facility]

## 2022-01-08 NOTE — PROGRESS NOTES
IVF's d/c'd this shift. Wife at bedside. Nares positive for MRSA. Attending aware. Ambulates in garcía.

## 2022-01-08 NOTE — PROGRESS NOTES
HOSPITALISTS PROGRESS NOTE    1/8/2022 10:01 AM        Name: Elian Ramirez . Admitted: 1/6/2022  Primary Care Provider: Daron Lenz, [address] (Tel: [phone])      CC: Fever    Brief Course: This 57 y.o. male with PMHx of psoriasis and BPH  presented with fever, cough, and congestion. Patient reported that he has been experiencing these symptoms from past 1 month. He was initially treated with Levaquin and steroid taper as outpatient but without any relief. He underwent CT chest which showed large right 8cm necrotic lung mass concerning for lung cancer and was referred to oncology. Patient was started on azthromycin on 12/29/21 but he continued spike high-grade fevers and had worsening cough.       Interval history:   Pt seen and examined today   Overnight events noted and interval ancillary notes  On room air, satting well  Low-grade fever of 99.6 overnight, WBCs trended down to 11, procalcitonin 0.88  denied any fevers, chills, chest pain, palpitations, cough, SOB  Plan for bronc on Monday          Assessment & Plan:     Large right upper lobe necrotic mass:  CT chest showed large right upper lobe necrotic mass with some element of postobstructive pneumonia. Bilateral other lung nodules are also present along with mild right hilar lymphadenopathy  Pulmonology and oncology on board: Appreciate their recs    Plan for bronchoscopy with biopsy of RUL mass & endobronchial ultrasound-guided FNA of hilar lymph node on Monday    Post Obstructive Pneumonia:  Pulmonology on board:Continue empiric IV antibiotics  Follow cultures,  MRSA nares and procalcitonin    Sepsis secondary to above: Resolved     Sinusitis:Antibiotics as above        DVT PPX:   Code:Full Code  Diet: ADULT DIET;  Regular  ADULT ORAL NUTRITION SUPPLEMENT; Lunch, Dinner; Standard High Calorie/High Protein Oral Supplement  Diet NPO    Disposition:     Current Medications  calcium carbonate (TUMS) chewable tablet 500 mg, TID PRN  finasteride (PROSCAR) tablet 5 mg, Daily  tamsulosin (FLOMAX) capsule 0.4 mg, Daily  clobetasol (TEMOVATE) 0.05 % cream, Daily  sodium chloride flush 0.9 % injection 5-40 mL, 2 times per day  sodium chloride flush 0.9 % injection 5-40 mL, PRN  0.9 % sodium chloride infusion, PRN  enoxaparin (LOVENOX) injection 40 mg, Daily  ondansetron (ZOFRAN-ODT) disintegrating tablet 4 mg, Q8H PRN   Or  ondansetron (ZOFRAN) injection 4 mg, Q6H PRN  polyethylene glycol (GLYCOLAX) packet 17 g, Daily PRN  acetaminophen (TYLENOL) tablet 650 mg, Q6H PRN   Or  acetaminophen (TYLENOL) suppository 650 mg, Q6H PRN  cefepime (MAXIPIME) 2000 mg IVPB minibag, Q12H  lactated ringers infusion, Continuous  vancomycin 1000 mg IVPB in 250 mL D5W addavial, Q12H        Objective:  /65   Pulse 80   Temp 98.4 °F (36.9 °C) (Oral)   Resp 18   Ht 6' 2\" (1.88 m)   Wt 261 lb 12.8 oz (118.8 kg)   SpO2 93%   BMI 33.61 kg/m²   No intake or output data in the 24 hours ending 01/08/22 1001   Wt Readings from Last 3 Encounters:   01/08/22 261 lb 12.8 oz (118.8 kg)   08/24/21 272 lb 12.8 oz (123.7 kg)       Physical Examination:   General appearance:  No apparent distress, appears stated age and cooperative. Eyes: Sclera clear. Pupils equal.  ENT: Moist oral mucosa. Trachea midline, no adenopathy. Cardiovascular: Regular rhythm, normal S1, S2. No murmur. No edema in lower extremities  Respiratory:Not using accessory muscles. Good inspiratory effort. Clear to auscultation bilaterally, no wheeze or crackles. GI: Abdomen soft, no tenderness, not distended, normal bowel sounds  Musculoskeletal: normal ROM, No cyanosis in digits  Neurology: CN 2-12 grossly intact. No speech or motor deficits  Psych: Normal affect.  Alert and oriented in time, place and person  Skin: Warm, dry, normal turgor    Labs and Tests:  CBC:   Recent Labs     01/06/22  2211 01/08/22  0407   WBC 18.9* 11.4* HGB 13.2* 11.7*    295     BMP:    Recent Labs     01/06/22  2211 01/07/22  1652 01/08/22  0407   * 133* 132*   K 4.1 4.4 3.6   CL 94* 97* 97*   CO2 23 27 26   BUN 27* 20 17   CREATININE 1.0 0.7* 0.6*   GLUCOSE 121* 106* 118*     Hepatic:   Recent Labs     01/06/22  2211 01/08/22  0407   AST 27 20   ALT 19 15   BILITOT 0.3 0.3   ALKPHOS 62 48     CT CHEST PULMONARY EMBOLISM W CONTRAST   Final Result   No evidence of a pulmonary embolus. Mildly dilated and atherosclerotic thoracic aorta with no aneurysm or   dissection. Large 8 cm necrotic mass along the right upper lobe which is not   significantly changed in size. There is increasing necrosis in the mass. Suggest PET scan correlation. Enlarging pulmonary nodules in both lungs which probably represents   progressive metastatic disease      Slowly resolving postobstructive infiltrates along the right upper lobe mass      Mildly enlarged right hilar lymph nodes which are unchanged. .  Suggest PET-CT   correlation. CT HEAD WO CONTRAST   Final Result   No acute intracranial abnormality. Probable severe chronic maxillary sinusitis on the left. Recommend follow-up. Problem List  Active Problems:    Pneumonia  Resolved Problems:    * No resolved hospital problems.  Yaw Dalal MD   1/8/2022 10:01 AM

## 2022-01-09 LAB
A/G RATIO: 0.7 (ref 1.1–2.2)
ALBUMIN SERPL-MCNC: 2.7 G/DL (ref 3.4–5)
ALP BLD-CCNC: 44 U/L (ref 40–129)
ALT SERPL-CCNC: 20 U/L (ref 10–40)
ANION GAP SERPL CALCULATED.3IONS-SCNC: 10 MMOL/L (ref 3–16)
AST SERPL-CCNC: 28 U/L (ref 15–37)
BASOPHILS ABSOLUTE: 0 K/UL (ref 0–0.2)
BASOPHILS RELATIVE PERCENT: 0.2 %
BILIRUB SERPL-MCNC: 0.4 MG/DL (ref 0–1)
BUN BLDV-MCNC: 13 MG/DL (ref 7–20)
CALCIUM SERPL-MCNC: 9.2 MG/DL (ref 8.3–10.6)
CHLORIDE BLD-SCNC: 98 MMOL/L (ref 99–110)
CO2: 27 MMOL/L (ref 21–32)
CREAT SERPL-MCNC: 0.7 MG/DL (ref 0.8–1.3)
EOSINOPHILS ABSOLUTE: 0 K/UL (ref 0–0.6)
EOSINOPHILS RELATIVE PERCENT: 0.3 %
GFR AFRICAN AMERICAN: >60
GFR NON-AFRICAN AMERICAN: >60
GLUCOSE BLD-MCNC: 104 MG/DL (ref 70–99)
HCT VFR BLD CALC: 35.6 % (ref 40.5–52.5)
HEMOGLOBIN: 11.7 G/DL (ref 13.5–17.5)
LYMPHOCYTES ABSOLUTE: 1.1 K/UL (ref 1–5.1)
LYMPHOCYTES RELATIVE PERCENT: 17.2 %
MCH RBC QN AUTO: 28.8 PG (ref 26–34)
MCHC RBC AUTO-ENTMCNC: 32.8 G/DL (ref 31–36)
MCV RBC AUTO: 87.9 FL (ref 80–100)
MONOCYTES ABSOLUTE: 1.1 K/UL (ref 0–1.3)
MONOCYTES RELATIVE PERCENT: 17 %
NEUTROPHILS ABSOLUTE: 4.2 K/UL (ref 1.7–7.7)
NEUTROPHILS RELATIVE PERCENT: 65.3 %
PDW BLD-RTO: 15 % (ref 12.4–15.4)
PLATELET # BLD: 290 K/UL (ref 135–450)
PMV BLD AUTO: 7 FL (ref 5–10.5)
POTASSIUM REFLEX MAGNESIUM: 3.8 MMOL/L (ref 3.5–5.1)
RBC # BLD: 4.05 M/UL (ref 4.2–5.9)
SODIUM BLD-SCNC: 135 MMOL/L (ref 136–145)
TOTAL PROTEIN: 6.7 G/DL (ref 6.4–8.2)
WBC # BLD: 6.4 K/UL (ref 4–11)

## 2022-01-09 PROCEDURE — 80053 COMPREHEN METABOLIC PANEL: CPT

## 2022-01-09 PROCEDURE — 6370000000 HC RX 637 (ALT 250 FOR IP): Performed by: NURSE PRACTITIONER

## 2022-01-09 PROCEDURE — 2580000003 HC RX 258: Performed by: INTERNAL MEDICINE

## 2022-01-09 PROCEDURE — 2580000003 HC RX 258: Performed by: NURSE PRACTITIONER

## 2022-01-09 PROCEDURE — 6360000002 HC RX W HCPCS: Performed by: INTERNAL MEDICINE

## 2022-01-09 PROCEDURE — 36415 COLL VENOUS BLD VENIPUNCTURE: CPT

## 2022-01-09 PROCEDURE — 2060000000 HC ICU INTERMEDIATE R&B

## 2022-01-09 PROCEDURE — 85025 COMPLETE CBC W/AUTO DIFF WBC: CPT

## 2022-01-09 PROCEDURE — 6360000002 HC RX W HCPCS: Performed by: NURSE PRACTITIONER

## 2022-01-09 RX ADMIN — SODIUM CHLORIDE 25 ML: 9 INJECTION, SOLUTION INTRAVENOUS at 17:38

## 2022-01-09 RX ADMIN — CEFEPIME HYDROCHLORIDE 2000 MG: 2 INJECTION, POWDER, FOR SOLUTION INTRAVENOUS at 01:46

## 2022-01-09 RX ADMIN — SODIUM CHLORIDE 25 ML: 9 INJECTION, SOLUTION INTRAVENOUS at 13:50

## 2022-01-09 RX ADMIN — ENOXAPARIN SODIUM 40 MG: 40 INJECTION SUBCUTANEOUS at 08:31

## 2022-01-09 RX ADMIN — VANCOMYCIN HYDROCHLORIDE 1500 MG: 10 INJECTION, POWDER, LYOPHILIZED, FOR SOLUTION INTRAVENOUS at 17:39

## 2022-01-09 RX ADMIN — Medication 10 ML: at 20:44

## 2022-01-09 RX ADMIN — TAMSULOSIN HYDROCHLORIDE 0.4 MG: 0.4 CAPSULE ORAL at 08:31

## 2022-01-09 RX ADMIN — Medication 10 ML: at 08:31

## 2022-01-09 RX ADMIN — VANCOMYCIN HYDROCHLORIDE 1500 MG: 10 INJECTION, POWDER, LYOPHILIZED, FOR SOLUTION INTRAVENOUS at 06:14

## 2022-01-09 RX ADMIN — CEFEPIME HYDROCHLORIDE 2000 MG: 2 INJECTION, POWDER, FOR SOLUTION INTRAVENOUS at 13:52

## 2022-01-09 RX ADMIN — FINASTERIDE 5 MG: 5 TABLET, FILM COATED ORAL at 08:31

## 2022-01-09 ASSESSMENT — PAIN SCALES - GENERAL
PAINLEVEL_OUTOF10: 0

## 2022-01-09 NOTE — PROGRESS NOTES
HOSPITALISTS PROGRESS NOTE    1/9/2022 9:43 AM        Name: Maria Olivo . Admitted: 1/6/2022  Primary Care Provider: Rina Preston (Tel: [phone])      CC: Fever    Brief Course: This 57 y.o. male with PMHx of psoriasis and BPH  presented with fever, cough, and congestion. Patient reported that he has been experiencing these symptoms from past 1 month. He was initially treated with Levaquin and steroid taper as outpatient but without any relief. He underwent CT chest which showed large right 8cm necrotic lung mass concerning for lung cancer and was referred to oncology. Patient was started on azthromycin on 12/29/21 but he continued spike high-grade fevers and had worsening cough.       Interval history:   Pt seen and examined today. Overnight events noted, interval ancillary notes and labs reviewed. On room air satting well   Afebrile overnight, WBCs trended down  MRSA nares tested positive, blood cultures negative to date  but denied cough, SOB, chest pain, nausea, vomiting or abdominal pain  Plan for bronc on Monday      Assessment & Plan:     Large right upper lobe necrotic mass:  CT chest showed large right upper lobe necrotic mass with some element of postobstructive pneumonia. Bilateral other lung nodules are also present along with mild right hilar lymphadenopathy  Pulmonology and oncology on board: Appreciate their recs    Plan for bronchoscopy with biopsy of RUL mass & endobronchial ultrasound-guided FNA of hilar lymph node on Monday    Post Obstructive Pneumonia:  Pulmonology on board:Continue empiric IV antibiotics  Follow cultures,  MRSA nares and procalcitonin    Sepsis secondary to above: Resolved     Sinusitis:Antibiotics as above        DVT PPX:   Code:Full Code  Diet: ADULT DIET;  Regular  ADULT ORAL NUTRITION SUPPLEMENT; Lunch, Dinner; Standard High Calorie/High Protein Oral Supplement  Diet NPO Exceptions are: Sips of Water with Meds    Disposition:     Current Medications  calcium carbonate (TUMS) chewable tablet 500 mg, TID PRN  vancomycin (VANCOCIN) 1,500 mg in dextrose 5 % 250 mL IVPB, Q12H  finasteride (PROSCAR) tablet 5 mg, Daily  tamsulosin (FLOMAX) capsule 0.4 mg, Daily  clobetasol (TEMOVATE) 0.05 % cream, Daily  sodium chloride flush 0.9 % injection 5-40 mL, 2 times per day  sodium chloride flush 0.9 % injection 5-40 mL, PRN  0.9 % sodium chloride infusion, PRN  enoxaparin (LOVENOX) injection 40 mg, Daily  ondansetron (ZOFRAN-ODT) disintegrating tablet 4 mg, Q8H PRN   Or  ondansetron (ZOFRAN) injection 4 mg, Q6H PRN  polyethylene glycol (GLYCOLAX) packet 17 g, Daily PRN  acetaminophen (TYLENOL) tablet 650 mg, Q6H PRN   Or  acetaminophen (TYLENOL) suppository 650 mg, Q6H PRN  cefepime (MAXIPIME) 2000 mg IVPB minibag, Q12H        Objective:  /66   Pulse 81   Temp 98.7 °F (37.1 °C) (Oral)   Resp 18   Ht 6' 2\" (1.88 m)   Wt 261 lb 12.8 oz (118.8 kg)   SpO2 93%   BMI 33.61 kg/m²     Intake/Output Summary (Last 24 hours) at 1/9/2022 0943  Last data filed at 1/8/2022 2150  Gross per 24 hour   Intake 255 ml   Output --   Net 255 ml      Wt Readings from Last 3 Encounters:   01/08/22 261 lb 12.8 oz (118.8 kg)   08/24/21 272 lb 12.8 oz (123.7 kg)       Physical Examination:   General appearance:  No apparent distress, appears stated age and cooperative. Eyes: Sclera clear. Pupils equal.  ENT: Moist oral mucosa. Trachea midline, no adenopathy. Cardiovascular: Regular rhythm, normal S1, S2. No murmur. No edema in lower extremities  Respiratory:Not using accessory muscles. Good inspiratory effort. Clear to auscultation bilaterally, no wheeze or crackles. GI: Abdomen soft, no tenderness, not distended, normal bowel sounds  Musculoskeletal: normal ROM, No cyanosis in digits  Neurology: CN 2-12 grossly intact. No speech or motor deficits  Psych: Normal affect.  Alert and oriented in time, place and person  Skin: Warm, dry, normal turgor    Labs and Tests:  CBC:   Recent Labs     01/06/22  2211 01/08/22 0407 01/09/22 0422   WBC 18.9* 11.4* 6.4   HGB 13.2* 11.7* 11.7*    295 290     BMP:    Recent Labs     01/07/22  1652 01/08/22  0407 01/09/22  0422   * 132* 135*   K 4.4 3.6 3.8   CL 97* 97* 98*   CO2 27 26 27   BUN 20 17 13   CREATININE 0.7* 0.6* 0.7*   GLUCOSE 106* 118* 104*     Hepatic:   Recent Labs     01/06/22 2211 01/08/22 0407 01/09/22 0422   AST 27 20 28   ALT 19 15 20   BILITOT 0.3 0.3 0.4   ALKPHOS 57 48 40     CT CHEST PULMONARY EMBOLISM W CONTRAST   Final Result   No evidence of a pulmonary embolus. Mildly dilated and atherosclerotic thoracic aorta with no aneurysm or   dissection. Large 8 cm necrotic mass along the right upper lobe which is not   significantly changed in size. There is increasing necrosis in the mass. Suggest PET scan correlation. Enlarging pulmonary nodules in both lungs which probably represents   progressive metastatic disease      Slowly resolving postobstructive infiltrates along the right upper lobe mass      Mildly enlarged right hilar lymph nodes which are unchanged. .  Suggest PET-CT   correlation. CT HEAD WO CONTRAST   Final Result   No acute intracranial abnormality. Probable severe chronic maxillary sinusitis on the left. Recommend follow-up. Problem List  Active Problems:    Pneumonia  Resolved Problems:    * No resolved hospital problems.  Juan Burroughs MD   1/9/2022 9:43 AM

## 2022-01-09 NOTE — PLAN OF CARE
Problem: Infection:  Goal: Will remain free from infection  Description: Will remain free from infection  Outcome: Ongoing     Problem: Safety:  Goal: Free from accidental physical injury  Description: Free from accidental physical injury  Outcome: Ongoing  Goal: Free from intentional harm  Description: Free from intentional harm  Outcome: Ongoing     Problem: Daily Care:  Goal: Daily care needs are met  Description: Daily care needs are met  Outcome: Ongoing     Problem: Nutrition  Goal: Optimal nutrition therapy  Outcome: Ongoing

## 2022-01-09 NOTE — PLAN OF CARE
Problem: Infection:  Goal: Will remain free from infection  Description: Will remain free from infection  1/9/2022 1411 by Frieda Moy RN  Outcome: Ongoing     Problem: Safety:  Goal: Free from accidental physical injury  Description: Free from accidental physical injury  1/9/2022 1411 by Frieda Moy RN  Outcome: Ongoing     Problem: Daily Care:  Goal: Daily care needs are met  Description: Daily care needs are met  1/9/2022 1411 by Frieda Moy RN  Outcome: Ongoing     Problem: Nutrition  Goal: Optimal nutrition therapy  1/9/2022 1411 by Frieda Moy RN  Outcome: Ongoing

## 2022-01-09 NOTE — PROGRESS NOTES
Pt reports coughing up blood, checked the trach bin and saw small amount of bright red mucus blood on a tissue. When asked pt, he states he has been coughing up blood the past two days.

## 2022-01-10 ENCOUNTER — APPOINTMENT (OUTPATIENT)
Dept: GENERAL RADIOLOGY | Age: 62
DRG: 871 | End: 2022-01-10
Payer: COMMERCIAL

## 2022-01-10 ENCOUNTER — APPOINTMENT (OUTPATIENT)
Dept: CT IMAGING | Age: 62
DRG: 871 | End: 2022-01-10
Payer: COMMERCIAL

## 2022-01-10 ENCOUNTER — ANESTHESIA (OUTPATIENT)
Dept: ENDOSCOPY | Age: 62
End: 2022-01-10

## 2022-01-10 VITALS
HEART RATE: 76 BPM | BODY MASS INDEX: 33.6 KG/M2 | WEIGHT: 261.8 LBS | TEMPERATURE: 99.1 F | OXYGEN SATURATION: 93 % | RESPIRATION RATE: 20 BRPM | DIASTOLIC BLOOD PRESSURE: 78 MMHG | HEIGHT: 74 IN | SYSTOLIC BLOOD PRESSURE: 118 MMHG

## 2022-01-10 LAB
A/G RATIO: 0.7 (ref 1.1–2.2)
ALBUMIN SERPL-MCNC: 2.8 G/DL (ref 3.4–5)
ALP BLD-CCNC: 43 U/L (ref 40–129)
ALT SERPL-CCNC: 24 U/L (ref 10–40)
ANION GAP SERPL CALCULATED.3IONS-SCNC: 10 MMOL/L (ref 3–16)
AST SERPL-CCNC: 28 U/L (ref 15–37)
BASOPHILS ABSOLUTE: 0 K/UL (ref 0–0.2)
BASOPHILS RELATIVE PERCENT: 0.4 %
BILIRUB SERPL-MCNC: 0.3 MG/DL (ref 0–1)
BLOOD CULTURE, ROUTINE: NORMAL
BUN BLDV-MCNC: 9 MG/DL (ref 7–20)
CALCIUM SERPL-MCNC: 8.6 MG/DL (ref 8.3–10.6)
CHLORIDE BLD-SCNC: 97 MMOL/L (ref 99–110)
CO2: 27 MMOL/L (ref 21–32)
CREAT SERPL-MCNC: 0.6 MG/DL (ref 0.8–1.3)
CULTURE, RESPIRATORY: NORMAL
EOSINOPHILS ABSOLUTE: 0 K/UL (ref 0–0.6)
EOSINOPHILS RELATIVE PERCENT: 0.2 %
GFR AFRICAN AMERICAN: >60
GFR NON-AFRICAN AMERICAN: >60
GLUCOSE BLD-MCNC: 104 MG/DL (ref 70–99)
GRAM STAIN RESULT: NORMAL
HCT VFR BLD CALC: 36.3 % (ref 40.5–52.5)
HEMOGLOBIN: 12.1 G/DL (ref 13.5–17.5)
LYMPHOCYTES ABSOLUTE: 1.3 K/UL (ref 1–5.1)
LYMPHOCYTES RELATIVE PERCENT: 26.7 %
MCH RBC QN AUTO: 29.1 PG (ref 26–34)
MCHC RBC AUTO-ENTMCNC: 33.3 G/DL (ref 31–36)
MCV RBC AUTO: 87.4 FL (ref 80–100)
MONOCYTES ABSOLUTE: 1 K/UL (ref 0–1.3)
MONOCYTES RELATIVE PERCENT: 21 %
NEUTROPHILS ABSOLUTE: 2.6 K/UL (ref 1.7–7.7)
NEUTROPHILS RELATIVE PERCENT: 51.7 %
PDW BLD-RTO: 14.7 % (ref 12.4–15.4)
PLATELET # BLD: 302 K/UL (ref 135–450)
PMV BLD AUTO: 6.7 FL (ref 5–10.5)
POTASSIUM REFLEX MAGNESIUM: 3.6 MMOL/L (ref 3.5–5.1)
PROCALCITONIN: 0.18 NG/ML (ref 0–0.15)
RBC # BLD: 4.15 M/UL (ref 4.2–5.9)
SARS-COV-2, NAAT: DETECTED
SODIUM BLD-SCNC: 134 MMOL/L (ref 136–145)
TOTAL PROTEIN: 6.6 G/DL (ref 6.4–8.2)
VANCOMYCIN RANDOM: 8 UG/ML
WBC # BLD: 4.9 K/UL (ref 4–11)

## 2022-01-10 PROCEDURE — 85025 COMPLETE CBC W/AUTO DIFF WBC: CPT

## 2022-01-10 PROCEDURE — 77012 CT SCAN FOR NEEDLE BIOPSY: CPT

## 2022-01-10 PROCEDURE — 0BBK3ZX EXCISION OF RIGHT LUNG, PERCUTANEOUS APPROACH, DIAGNOSTIC: ICD-10-PCS | Performed by: RADIOLOGY

## 2022-01-10 PROCEDURE — 80202 ASSAY OF VANCOMYCIN: CPT

## 2022-01-10 PROCEDURE — 6360000002 HC RX W HCPCS: Performed by: INTERNAL MEDICINE

## 2022-01-10 PROCEDURE — 87070 CULTURE OTHR SPECIMN AEROBIC: CPT

## 2022-01-10 PROCEDURE — 71045 X-RAY EXAM CHEST 1 VIEW: CPT

## 2022-01-10 PROCEDURE — 2580000003 HC RX 258: Performed by: INTERNAL MEDICINE

## 2022-01-10 PROCEDURE — 80053 COMPREHEN METABOLIC PANEL: CPT

## 2022-01-10 PROCEDURE — 84145 PROCALCITONIN (PCT): CPT

## 2022-01-10 PROCEDURE — 87635 SARS-COV-2 COVID-19 AMP PRB: CPT

## 2022-01-10 PROCEDURE — 6360000002 HC RX W HCPCS: Performed by: RADIOLOGY

## 2022-01-10 PROCEDURE — 87205 SMEAR GRAM STAIN: CPT

## 2022-01-10 PROCEDURE — 87077 CULTURE AEROBIC IDENTIFY: CPT

## 2022-01-10 PROCEDURE — 99233 SBSQ HOSP IP/OBS HIGH 50: CPT | Performed by: INTERNAL MEDICINE

## 2022-01-10 PROCEDURE — 6370000000 HC RX 637 (ALT 250 FOR IP): Performed by: NURSE PRACTITIONER

## 2022-01-10 PROCEDURE — 88305 TISSUE EXAM BY PATHOLOGIST: CPT

## 2022-01-10 PROCEDURE — 99153 MOD SED SAME PHYS/QHP EA: CPT

## 2022-01-10 PROCEDURE — 2580000003 HC RX 258: Performed by: NURSE PRACTITIONER

## 2022-01-10 PROCEDURE — 87075 CULTR BACTERIA EXCEPT BLOOD: CPT

## 2022-01-10 PROCEDURE — 6360000002 HC RX W HCPCS: Performed by: NURSE PRACTITIONER

## 2022-01-10 PROCEDURE — 88312 SPECIAL STAINS GROUP 1: CPT

## 2022-01-10 PROCEDURE — 36415 COLL VENOUS BLD VENIPUNCTURE: CPT

## 2022-01-10 PROCEDURE — 88342 IMHCHEM/IMCYTCHM 1ST ANTB: CPT

## 2022-01-10 RX ORDER — CEFDINIR 300 MG/1
300 CAPSULE ORAL EVERY 12 HOURS SCHEDULED
Qty: 10 CAPSULE | Refills: 0 | Status: SHIPPED
Start: 2022-01-11 | End: 2022-01-10 | Stop reason: HOSPADM

## 2022-01-10 RX ORDER — ACETAMINOPHEN 325 MG/1
650 TABLET ORAL EVERY 4 HOURS PRN
Status: DISCONTINUED | OUTPATIENT
Start: 2022-01-10 | End: 2022-01-10 | Stop reason: HOSPADM

## 2022-01-10 RX ORDER — AMOXICILLIN AND CLAVULANATE POTASSIUM 875; 125 MG/1; MG/1
1 TABLET, FILM COATED ORAL EVERY 12 HOURS SCHEDULED
Status: DISCONTINUED | OUTPATIENT
Start: 2022-01-10 | End: 2022-01-10 | Stop reason: HOSPADM

## 2022-01-10 RX ORDER — MIDAZOLAM HYDROCHLORIDE 1 MG/ML
INJECTION INTRAMUSCULAR; INTRAVENOUS
Status: COMPLETED | OUTPATIENT
Start: 2022-01-10 | End: 2022-01-10

## 2022-01-10 RX ORDER — FENTANYL CITRATE 50 UG/ML
INJECTION, SOLUTION INTRAMUSCULAR; INTRAVENOUS
Status: COMPLETED | OUTPATIENT
Start: 2022-01-10 | End: 2022-01-10

## 2022-01-10 RX ORDER — CEFDINIR 300 MG/1
300 CAPSULE ORAL EVERY 12 HOURS SCHEDULED
Status: DISCONTINUED | OUTPATIENT
Start: 2022-01-10 | End: 2022-01-10 | Stop reason: HOSPADM

## 2022-01-10 RX ORDER — AMOXICILLIN AND CLAVULANATE POTASSIUM 875; 125 MG/1; MG/1
1 TABLET, FILM COATED ORAL EVERY 12 HOURS SCHEDULED
Qty: 10 TABLET | Refills: 0 | Status: SHIPPED | OUTPATIENT
Start: 2022-01-11 | End: 2022-01-16

## 2022-01-10 RX ADMIN — MIDAZOLAM 1 MG: 1 INJECTION INTRAMUSCULAR; INTRAVENOUS at 13:31

## 2022-01-10 RX ADMIN — TAMSULOSIN HYDROCHLORIDE 0.4 MG: 0.4 CAPSULE ORAL at 09:12

## 2022-01-10 RX ADMIN — FENTANYL CITRATE 50 MCG: 50 INJECTION, SOLUTION INTRAMUSCULAR; INTRAVENOUS at 13:31

## 2022-01-10 RX ADMIN — FINASTERIDE 5 MG: 5 TABLET, FILM COATED ORAL at 09:12

## 2022-01-10 RX ADMIN — CEFEPIME HYDROCHLORIDE 2000 MG: 2 INJECTION, POWDER, FOR SOLUTION INTRAVENOUS at 02:09

## 2022-01-10 RX ADMIN — VANCOMYCIN HYDROCHLORIDE 1500 MG: 10 INJECTION, POWDER, LYOPHILIZED, FOR SOLUTION INTRAVENOUS at 06:34

## 2022-01-10 RX ADMIN — CEFEPIME HYDROCHLORIDE 2000 MG: 2 INJECTION, POWDER, FOR SOLUTION INTRAVENOUS at 16:21

## 2022-01-10 RX ADMIN — Medication 10 ML: at 10:00

## 2022-01-10 ASSESSMENT — PAIN SCALES - GENERAL
PAINLEVEL_OUTOF10: 0

## 2022-01-10 NOTE — PROGRESS NOTES
HOSPITALISTS PROGRESS NOTE    1/10/2022 8:57 AM        Name: Luis Miguel Kearney . Admitted: 1/6/2022  Primary Care Provider: Rina Boo (Tel: [phone])      CC: Fever    Brief Course: This 57 y.o. male with PMHx of psoriasis and BPH  presented with fever, cough, and congestion. Patient reported that he has been experiencing these symptoms from past 1 month. He was initially treated with Levaquin and steroid taper as outpatient but without any relief. He underwent CT chest which showed large right 8cm necrotic lung mass concerning for lung cancer and was referred to oncology. Patient was started on azthromycin on 12/29/21 but he continued spike high-grade fevers and had worsening cough.       Interval history:   Pt seen and examined today. Overnight events noted, interval ancillary notes and labs reviewed. Low-grade fever of 99.6 overnight, WBCs within normal limits, procalcitonin down to 0.18  Remains on room air satting well  Tested positive for Covid; not candidate of remdesivir Actemra or steroids  Bronchoscopy canceled; plan for CT-guided biopsy by IR today  denied cough, SOB, chest pain, nausea, vomiting or abdominal pain              Assessment & Plan:     Large right upper lobe necrotic mass:  CT chest showed large right upper lobe necrotic mass with some element of postobstructive pneumonia.  Bilateral other lung nodules are also present along with mild right hilar lymphadenopathy  Pulmonology and oncology on board: Appreciate their recs  MRSA nares positive; on Vanco and cefepime  Plan for CT-guided biopsy of RUL mass by IR today    Post Obstructive Pneumonia:  Pulmonology on board:Continue empiric IV antibiotics  MRSA nares positive   Elevated procalcitonin on admission ; trended down p    Sepsis secondary to above: Resolved     Sinusitis:Antibiotics as above        DVT PPX: Lovenox  Code:Full Code  Diet: Diet NPO Exceptions are: Sips of Water with Meds    Disposition:     Current Medications  calcium carbonate (TUMS) chewable tablet 500 mg, TID PRN  vancomycin (VANCOCIN) 1,500 mg in dextrose 5 % 250 mL IVPB, Q12H  finasteride (PROSCAR) tablet 5 mg, Daily  tamsulosin (FLOMAX) capsule 0.4 mg, Daily  clobetasol (TEMOVATE) 0.05 % cream, Daily  sodium chloride flush 0.9 % injection 5-40 mL, 2 times per day  sodium chloride flush 0.9 % injection 5-40 mL, PRN  0.9 % sodium chloride infusion, PRN  enoxaparin (LOVENOX) injection 40 mg, Daily  ondansetron (ZOFRAN-ODT) disintegrating tablet 4 mg, Q8H PRN   Or  ondansetron (ZOFRAN) injection 4 mg, Q6H PRN  polyethylene glycol (GLYCOLAX) packet 17 g, Daily PRN  acetaminophen (TYLENOL) tablet 650 mg, Q6H PRN   Or  acetaminophen (TYLENOL) suppository 650 mg, Q6H PRN  cefepime (MAXIPIME) 2000 mg IVPB minibag, Q12H        Objective:  /72   Pulse 88   Temp 99 °F (37.2 °C) (Oral)   Resp 20   Ht 6' 2\" (1.88 m)   Wt 261 lb 12.8 oz (118.8 kg)   SpO2 93%   BMI 33.61 kg/m²     Intake/Output Summary (Last 24 hours) at 1/10/2022 0857  Last data filed at 1/9/2022 2044  Gross per 24 hour   Intake 455 ml   Output --   Net 455 ml      Wt Readings from Last 3 Encounters:   01/08/22 261 lb 12.8 oz (118.8 kg)   08/24/21 272 lb 12.8 oz (123.7 kg)       Physical Examination:   General appearance:  No apparent distress, appears stated age and cooperative. Eyes: Sclera clear. Pupils equal.  ENT: Moist oral mucosa. Trachea midline, no adenopathy. Cardiovascular: Regular rhythm, normal S1, S2. No murmur. No edema in lower extremities  Respiratory:Not using accessory muscles. Good inspiratory effort. Clear to auscultation bilaterally, no wheeze or crackles. GI: Abdomen soft, no tenderness, not distended, normal bowel sounds  Musculoskeletal: normal ROM, No cyanosis in digits  Neurology: CN 2-12 grossly intact. No speech or motor deficits  Psych: Normal affect.  Alert and oriented in time, place and person  Skin: Warm, dry, normal turgor    Labs and Tests:  CBC:   Recent Labs     01/08/22  0407 01/09/22  0422 01/10/22  0418   WBC 11.4* 6.4 4.9   HGB 11.7* 11.7* 12.1*    290 302     BMP:    Recent Labs     01/07/22  1652 01/07/22  1652 01/08/22  0407 01/09/22 0422 01/10/22  0418   *  --  132* 135*  --    K 4.4  --  3.6 3.8  --    CL 97*  --  97* 98*  --    CO2 27  --  26 27  --    BUN 20  --  17 13  --    CREATININE 0.7*   < > 0.6* 0.7* 0.6*   GLUCOSE 106*  --  118* 104*  --     < > = values in this interval not displayed. Hepatic:   Recent Labs     01/08/22  0407 01/09/22  0422 01/10/22  0418   AST 20 28  --    ALT 15 20  --    BILITOT 0.3 0.4 0.3   ALKPHOS 48 44  --      CT CHEST PULMONARY EMBOLISM W CONTRAST   Final Result   No evidence of a pulmonary embolus. Mildly dilated and atherosclerotic thoracic aorta with no aneurysm or   dissection. Large 8 cm necrotic mass along the right upper lobe which is not   significantly changed in size. There is increasing necrosis in the mass. Suggest PET scan correlation. Enlarging pulmonary nodules in both lungs which probably represents   progressive metastatic disease      Slowly resolving postobstructive infiltrates along the right upper lobe mass      Mildly enlarged right hilar lymph nodes which are unchanged. .  Suggest PET-CT   correlation. CT HEAD WO CONTRAST   Final Result   No acute intracranial abnormality. Probable severe chronic maxillary sinusitis on the left. Recommend follow-up. Problem List  Active Problems:    Pneumonia  Resolved Problems:    * No resolved hospital problems.  Aniyah Min MD   1/10/2022 8:57 AM

## 2022-01-10 NOTE — PLAN OF CARE
Problem: Infection:  Goal: Will remain free from infection  Description: Will remain free from infection  1/10/2022 1440 by Nely Gold RN  Outcome: Ongoing     Problem: Daily Care:  Goal: Daily care needs are met  Description: Daily care needs are met  1/10/2022 1440 by Nely Gold RN  Outcome: Ongoing     Problem: Breathing Pattern - Ineffective  Goal: Ability to achieve and maintain a regular respiratory rate  Outcome: Ongoing     Problem:  Body Temperature -  Risk of, Imbalanced  Goal: Ability to maintain a body temperature within defined limits  Outcome: Ongoing

## 2022-01-10 NOTE — BRIEF OP NOTE
Brief Operative Note      Patient: Josette Sever MRN: [id number]     YOB: 1960  Age: 57 y.o. Sex: male        DATE OF PROCEDURE: 1/10/2022     PROCEDURE PERFORMED: Aspirate and core biopsies    SURGEON: Gogo William MD, MD    ANESTHESIA: Fentanyl, Versed    Estimated Blood Loss:none    Complications: None. Device implanted: None.            Specimen: 5 cc bloody aspirate from cavity and 3 cores, 18 gauge, 2.0 cm    Electronically signed by Gogo William MD on 1/10/2022 at 1:56 PM

## 2022-01-10 NOTE — PROGRESS NOTES
PULMONARY AND CRITICAL CARE PROGRESS NOTE    Subjective: Patient sitting in chair in no apparent respite distress. Still having low-grade fevers. Patient's rapid COVID test came back as positive. Has been n.p.o. for planned bronchoscopy procedure. Intermittent cough persist.    REVIEW OF SYSTEMS:   8 point review of system is negative except that mentioned in the subjective portion. PAST MEDICAL HISTORY:   Past Medical History:   Diagnosis Date    BPH (benign prostatic hyperplasia)     Psoriasis        PAST SURGICAL HISTORY:   Past Surgical History:   Procedure Laterality Date    COLONOSCOPY      ESOPHAGUS SURGERY  2019    hernia    FOOT SURGERY  2019    bilateraly hammer toes    UPPER GASTROINTESTINAL ENDOSCOPY         SOCIAL HISTORY:   Social History     Tobacco Use    Smoking status: Never Smoker    Smokeless tobacco: Never Used   Substance Use Topics    Alcohol use: Yes     Comment: occasionally    Drug use: Never       FAMILY HISTORY:   Family History   Problem Relation Age of Onset    High Blood Pressure Mother     High Cholesterol Mother        MEDICATIONS:     No current facility-administered medications on file prior to encounter.      Current Outpatient Medications on File Prior to Encounter   Medication Sig Dispense Refill    albuterol sulfate HFA (VENTOLIN HFA) 108 (90 Base) MCG/ACT inhaler Inhale 2 puffs into the lungs 4 times daily as needed for Wheezing 54 g 1    tamsulosin (FLOMAX) 0.4 MG capsule Take 1 capsule by mouth daily 30 capsule 11    naproxen (NAPROSYN) 500 MG tablet Take 1 tablet by mouth 2 times daily (with meals) 60 tablet 3    predniSONE (DELTASONE) 10 MG tablet 60 mg day 1(6 tabs), 50 mg day 2 (5 tabs), 40 mg day 3 (4 tabs), 30 mg day 4 (3 tabs), 20 mg day 5 (2 tabs), 10 mg day 6 (1 tab) 21 tablet 0    finasteride (PROSCAR) 5 MG tablet Take 1 tablet by mouth daily 30 tablet 11    clobetasol (TEMOVATE) 0.05 % cream Apply topically daily Apply topically once daily 60 g 11        vancomycin  1,500 mg IntraVENous Q12H    finasteride  5 mg Oral Daily    tamsulosin  0.4 mg Oral Daily    clobetasol   Topical Daily    sodium chloride flush  5-40 mL IntraVENous 2 times per day    enoxaparin  40 mg SubCUTAneous Daily    cefepime  2,000 mg IntraVENous Q12H      sodium chloride 25 mL (01/09/22 4496)     calcium carbonate, sodium chloride flush, sodium chloride, ondansetron **OR** ondansetron, polyethylene glycol, acetaminophen **OR** acetaminophen    ALLERGIES:   Allergies as of 01/06/2022    (No Known Allergies)      OBJECTIVE:   height is 6' 2\" (1.88 m) and weight is 261 lb 12.8 oz (118.8 kg). His oral temperature is 99 °F (37.2 °C). His blood pressure is 110/72 and his pulse is 88. His respiration is 20 and oxygen saturation is 93%. No intake/output data recorded. PHYSICAL EXAM:  CONSTITUTIONAL: He is a 57y.o.-year-old who appears his stated age. He is alert and oriented x 3 and in no acute distress. HEENT: PERRL. No scleral icterus. No thrush, atraumatic, normocephalic. NECK: Supple, without cervical or supraclavicular lymphadenopathy:  CARDIOVASCULAR: S1 S2 RRR. Without murmer  RESPIRATORY & CHEST: Bilateral minimal wheezing in upper lung fields, no crackles. Good air movement  GASTROINTESTINAL & ABDOMEN: Soft, nontender, positive bowel sounds in all quadrants, non-distended, without hepatosplenomegaly. GENITOURINARY: Deferred. MUSCULOSKELETAL: No tenderness to palpation of the axial skeleton. There is no clubbing. No cyanosis. No edema of the lower extremities. SKIN OF BODY: No rash or jaundice. PSYCHIATRIC EVALUATION: Normal affect. Patient answers questions appropriately. HEMATOLOGIC/LYMPHATIC/ IMMUNOLOGIC: No palpable lymphadenopathy. NEUROLOGIC: Alert and oriented x 3. Groslly non-focal. Motor strength is 5+/5 in all muscle groups. The patient has a normal sensorium globally.       LABS:    LABS:  Recent Labs     01/08/22  9346 01/09/22  3127 01/10/22  0418   WBC 11.4* 6.4 4.9   HGB 11.7* 11.7* 12.1*   HCT 35.5* 35.6* 36.3*    290 302   ALT 15 20 24   AST 20 28 28   * 135* 134*   K 3.6 3.8 3.6   CL 97* 98* 97*   CREATININE 0.6* 0.7* 0.6*   BUN 17 13 9   CO2 26 27 27       Recent Labs     01/08/22  0407 01/09/22 0422 01/10/22  0418   GLUCOSE 118* 104* 104*   CALCIUM 8.9 9.2 8.6   * 135* 134*   K 3.6 3.8 3.6   CO2 26 27 27   CL 97* 98* 97*   BUN 17 13 9   CREATININE 0.6* 0.7* 0.6*       No results for input(s): PHART, SEC8HZS, PO2ART, IXY8MGV, D3ZNIWOJ, BEART, H2WGXWVP in the last 72 hours. Lab Results   Component Value Date    INR 1.34 (H) 01/06/2022    PROTIME 15.3 (H) 01/06/2022     No results found for: AMYLASE   No results found for: LABA1C  No results found for: EAG  No results found for: TSH, B8TFTCG, N0SDHXT, THYROIDAB, FT3, T4FREE  Lab Results   Component Value Date    TROPONINI <0.01 01/06/2022      Lab Results   Component Value Date    .5 (H) 01/06/2022      No results found for: BNP   No results found for: DDIMER   No results found for: FERRITIN   No results found for: LACTA    IMPRESSION:   Large right upper lobe necrotic mass  Postobstructive pneumonia  Lung nodules  History of psoriasis  COVID-19 infection      RECOMMENDATION:   -Patient had presented with a right upper lobe necrotic mass/pneumonia. High suspicion for atypical infection, malignancy, not resolving apical pneumonia.  -He is having low-grade fevers for almost a month now associated with weight loss. The constellation of symptoms makes it suspicious and malignancy needs to be ruled out.  -Original plan was to perform a bronchoscopy evaluation today.   Unfortunately his rapid COVID test has come back positive which will prevent me from doing bronchoscopy as per hospital policy.  -Instead I have discussed his case with interventional radiologist Dr. John Hayden, who has graciously excepted to perform a CT-guided needle biopsy of the mass and aspiration of the cavity.  -Patient has failed two outpatient antibiotics. He has elevated procalcitonin and leukocytosis. Likely some postobstructive pneumonia component. Currently on vancomycin and cefepime.  -Respirate cultures so far no growth. -I will de-escalate antibiotics based on cultures.  -Currently on room air without any symptoms typical for COVID-19. I will hold off on starting him on any COVID specific therapies at this time.  -I discussed the plan with the patient and he is agreeable. Azar Veras MD   Pulmonary Critical Care and Sleep Medicine  [address] [facility],[address]   [address] [facility], [address] [place] [address]  1/6/2022, 10:28 AM            This note was completed using dragon medical speech recognition software. Grammatical errors, random word insertions, pronoun errors and incomplete sentences are occasional consequences of this technology due to software limitations. If there are questions or concerns about the content of this note of information contained within the body of this dictation they should be addressed with the provider for clarification.

## 2022-01-10 NOTE — PLAN OF CARE
Problem: Infection:  Goal: Will remain free from infection  Description: Will remain free from infection  1/10/2022 1801 by Raciel Osborne RN  Outcome: Ongoing     Problem: Safety:  Goal: Free from accidental physical injury  Description: Free from accidental physical injury  Outcome: Ongoing     Problem: Daily Care:  Goal: Daily care needs are met  Description: Daily care needs are met  1/10/2022 1801 by Raciel Osborne RN  Outcome: Ongoing     Problem: Nutrition  Goal: Optimal nutrition therapy  Outcome: Ongoing     Problem: Breathing Pattern - Ineffective  Goal: Ability to achieve and maintain a regular respiratory rate  1/10/2022 1801 by Raciel Osborne RN  Outcome: Ongoing     Problem:  Body Temperature -  Risk of, Imbalanced  Goal: Ability to maintain a body temperature within defined limits  Outcome: Ongoing     Problem: Isolation Precautions - Risk of Spread of Infection  Goal: Prevent transmission of infection  Outcome: Ongoing     Problem: Nutrition Deficits  Goal: Optimize nutritional status  Outcome: Ongoing

## 2022-01-10 NOTE — PRE SEDATION
Sedation Pre-Procedure Note    Patient Name: Danielle Julian   YOB: 1960  Room/Bed: [facility]2016/1944-11  Medical Record Number: [id number]  Date: 1/10/2022   Time: 1:09 PM       Indication:  Lung necrotic lesion for aspiration/culture and core biopsy/cytology    Consent: I have discussed with the patient and/or the patient representative the indication, alternatives, and the possible risks and/or complications of the planned procedure and the anesthesia methods. The patient and/or patient representative appear to understand and agree to proceed. Vital Signs:   Vitals:    01/10/22 1230   BP: 116/72   Pulse: 82   Resp: 18   Temp: 98.3 °F (36.8 °C)   SpO2: 94%       Past Medical History:   has a past medical history of BPH (benign prostatic hyperplasia) and Psoriasis. Past Surgical History:   has a past surgical history that includes Esophagus surgery (2019); Foot surgery (2019); Upper gastrointestinal endoscopy; and Colonoscopy. Medications:   Scheduled Meds:    vancomycin  2,000 mg IntraVENous Q12H    finasteride  5 mg Oral Daily    tamsulosin  0.4 mg Oral Daily    clobetasol   Topical Daily    sodium chloride flush  5-40 mL IntraVENous 2 times per day    enoxaparin  40 mg SubCUTAneous Daily    cefepime  2,000 mg IntraVENous Q12H     Continuous Infusions:    sodium chloride 25 mL (01/09/22 1738)     PRN Meds: calcium carbonate, sodium chloride flush, sodium chloride, ondansetron **OR** ondansetron, polyethylene glycol, acetaminophen **OR** acetaminophen  Home Meds:   Prior to Admission medications    Medication Sig Start Date End Date Taking?  Authorizing Provider   albuterol sulfate HFA (VENTOLIN HFA) 108 (90 Base) MCG/ACT inhaler Inhale 2 puffs into the lungs 4 times daily as needed for Wheezing 12/14/21  Yes ERICA Diaz NP   tamsulosin (FLOMAX) 0.4 MG capsule Take 1 capsule by mouth daily 8/24/21  Yes WESLY Callahan   naproxen (NAPROSYN) 500 MG tablet Take 1 tablet by mouth 2 times daily (with meals) 8/24/21  Yes WESLY Dunn   predniSONE (DELTASONE) 10 MG tablet 60 mg day 1(6 tabs), 50 mg day 2 (5 tabs), 40 mg day 3 (4 tabs), 30 mg day 4 (3 tabs), 20 mg day 5 (2 tabs), 10 mg day 6 (1 tab) 12/14/21   Dominique Needle [place], APRN - NP   finasteride (PROSCAR) 5 MG tablet Take 1 tablet by mouth daily 8/24/21   WESLY Dunn   clobetasol (TEMOVATE) 0.05 % cream Apply topically daily Apply topically once daily 8/24/21   WESLY Dunn     Coumadin Use Last 7 Days:  no  Antiplatelet drug therapy use last 7 days: no  Other anticoagulant use last 7 days: no  Additional Medication Information:  na      Pre-Sedation Documentation and Exam:   I have reviewed the patient's history and review of systems.     Mallampati Airway Assessment:  Mallampati Class II - (soft palate, fauces & uvula are visible)    Prior History of Anesthesia Complications:   none    ASA Classification:  Class 1 - A normal healthy patient    Sedation/ Anesthesia Plan:   intravenous sedation    Medications Planned:   midazolam (Versed) intravenously and fentanyl intravenously    Patient is an appropriate candidate for plan of sedation: yes    Electronically signed by Jody Keen MD on 1/10/2022 at 1:09 PM

## 2022-01-10 NOTE — PROGRESS NOTES
Data- discharge order received, pt verbalized agreement to discharge, disposition to previous residence, no needs for HHC/DME. Action- discharge instructions prepared and given to patient, pt verbalized understanding. Medication information packet given r/t NEW and/or CHANGED prescriptions emphasizing name/purpose/side effects, pt verbalized understanding. Discharge instruction summary: Diet- general, Activity- as tolerated, Primary Care Physician as follows: Vicky Pena [place] [phone] f/u appointment within one week, immunizations reviewed and complete, prescription medications filled personal pharmacy. Inpatient surgical procedure precautions reviewed: Biopsy. 1. WEIGHT: Admit Weight: 263 lb 6.4 oz (119.5 kg) (01/07/22 0241)        Today  Weight: 261 lb 12.8 oz (118.8 kg) (01/08/22 0515)       2. O2 SAT.: SpO2: 93 % (01/10/22 1537)    Response- Pt belongings gathered, IV removed. Disposition is home (no HHC/DME needs), transported with personal belongings, taken to lobby via w/c, no complications.

## 2022-01-10 NOTE — PROGRESS NOTES
Clinical Pharmacy Note: Pharmacy to Dose Vancomcyin    Vancomycin Day: 4  Current Dosing Regimen: 1500 mg 12  Dosing Method: Bayesian Modeling    Random: 8.0    Recent Labs     01/09/22  0422 01/10/22  0418   BUN 13 9       Recent Labs     01/09/22  0422 01/10/22  0418   CREATININE 0.7* 0.6*       Recent Labs     01/09/22  0422 01/10/22  0418   WBC 6.4 4.9         Intake/Output Summary (Last 24 hours) at 1/10/2022 1054  Last data filed at 1/9/2022 2044  Gross per 24 hour   Intake 205 ml   Output --   Net 205 ml         Ht Readings from Last 1 Encounters:   01/07/22 6' 2\" (1.88 m)        Wt Readings from Last 1 Encounters:   01/08/22 261 lb 12.8 oz (118.8 kg)         Body mass index is 33.61 kg/m². Estimated Creatinine Clearance: 175 mL/min (A) (based on SCr of 0.6 mg/dL (L)). Assessment/Plan:  Vancomycin level is subtherapeutic. Level was drawn appropriately in respect to last dose given. Increase vancomycin regimen to 2000 mg every 12 hours. Bayesian Modeling predicts an AUC of 497 mg/L*hr and trough of 12.1 mg/L. A vancomycin random level has been ordered on 1/11 at 0600 for follow-up. Changes in regimen will be determined based on culture results, renal function, and clinical response. Pharmacy will continue to monitor and adjust regimen as necessary.     Thank you for the consult,    Jennifer Lopez, PharmD, [facility]

## 2022-01-10 NOTE — CARE COORDINATION
Pending ID, RN to offer [facility] teaching prior to discharge, (and an order for an anaphylactic kit if first dose in the home)  and discharge after last dose (via [place] [id number]). Please add IVABX order/dose and STOP date to the [address] [place] [address] [place] form. Call [facility] . [phone]. Thank you    Met with patient to offer assistance with discharge, and he has no home care agency preference. Patient referred to [facility], pending a home care order.

## 2022-01-11 ENCOUNTER — CARE COORDINATION (OUTPATIENT)
Dept: CASE MANAGEMENT | Age: 62
End: 2022-01-11

## 2022-01-11 ENCOUNTER — TELEPHONE (OUTPATIENT)
Dept: PULMONOLOGY | Age: 62
End: 2022-01-11

## 2022-01-11 LAB — CULTURE, BLOOD 2: NORMAL

## 2022-01-11 NOTE — TELEPHONE ENCOUNTER
The patient states that on his discharge papers it says he is to have a PET scan. I do not see an order in epic for one is this something you want scheduled for him?

## 2022-01-11 NOTE — TELEPHONE ENCOUNTER
Pt called and said that on his hospital discharge paperwork it said to schedule a PET scan.     Call him to discuss    [phone]

## 2022-01-11 NOTE — CARE COORDINATION
Care Transitions Outreach Attempt    Call within 2 business days of discharge: Yes   Attempted to reach patient for transitions of care follow up. Unable to reach patient. Left HIPPA Compliant VM. Desiree Blake LPN, [facility] 30: [phone]      Patient: Orquidea Garcia Patient : 1960 MRN: [id number]    Last Discharge [facility]       Complaint Diagnosis Description Type Department Provider    22 Fever; Headache Pneumonia due to infectious organism, unspecified laterality, unspecified part of lung . .. ED to Hosp-Admission (Discharged) (ADMITTED) [facility] 3T Liam Hankins MD; Brooke Iraheta. .. Was this an external facility discharge?  No Discharge Facility:     Noted following upcoming appointments from discharge chart review:   [facility] follow up appointment(s):   Future Appointments   Date Time Provider Diana Parisi   3/7/2022  3:30 PM Anna Arce MD PULM & [facility]     Non-[facility] follow up appointment(s):

## 2022-01-12 ENCOUNTER — CARE COORDINATION (OUTPATIENT)
Dept: CASE MANAGEMENT | Age: 62
End: 2022-01-12

## 2022-01-12 NOTE — CARE COORDINATION
[facility] Transitions Initial Follow Up Call    Call within 2 business days of discharge: Yes    Patient: Valerie Chahal Patient : 1960   MRN: [id number]  Reason for Admission: aspiration/culture and core biopsy/cytology of necrotic lung lesion  Discharge Date: 1/10/22 RARS: Readmission Risk Score: 7.9 ( )    Second and final attempt at 24 hour discharge call, no answer, CTN left VM with contact information and request for return call. CTN will send patient a message in My Chart and route message to PCP regarding inability to reach patient. CTN will resolve episode and remain available.         Karyle Lance, RN

## 2022-01-12 NOTE — TELEPHONE ENCOUNTER
Pt called back today saying per Dr Elidia Gil office we needed to set up his PET Scan This did not sound right so I called Dr Elidia Gil office and spoke with his nurse Candance Frizzle She stated Dr Marcus Vaughn is in fact ordering PET Scan and they would be in contact with pt once they obtain authorization from his insurance I called pt and informed him of this.

## 2022-01-12 NOTE — TELEPHONE ENCOUNTER
Spoke with patient. He states [facility] is scheduling the PET scan for him. [facility] f/u scheduled 01/21/22.

## 2022-01-16 NOTE — DISCHARGE SUMMARY
Hospital Medicine Discharge Summary    Patient ID: Naresh Gil      Patient's PCP: WESLY Cervantes    Admit Date: 1/6/2022     Discharge Date: 1/10/2022     Admitting Physician: Heladio Turk MD     Discharge Physician: Benedict Serrano MD        Active Hospital Problems    Diagnosis     Pneumonia [J18.9]          Hospital Course: This 57 y. o. male with PMHx of psoriasis and BPH  presented with fever, cough, and congestion. Patient reported that he has been experiencing these symptoms from past 1 month. He was initially treated with Levaquin and steroid taper as outpatient but without any relief. He underwent CT chest which showed large right 8cm necrotic lung mass concerning for lung cancer and was referred to oncology. Patient was started on azthromycin on 12/29/21 but he continued spike high-grade fevers and had worsening cough. Patient presented to the hospital for further evaluation      Large right upper lobe necrotic mass: CT chest showed large right upper lobe necrotic mass with some element of postobstructive pneumonia. Bilateral lung nodules are also present along with mild right hilar lymphadenopathy. On admission, patient was febrile and had elevated procalcitonin. MRSA nares was positive. Patient was started on cefepime and vancomycin. Pulmonology and oncology were consulted and patient was scheduled initially for bronchoscopy with biopsy of RUL mass and endobronchial ultrasound-guided FNA of hilar lymph node which had to be canceled as patient tested positive for COVID. IR was consulted and patient underwent CT-guided biopsy of RUL mass on 1/10/2022. Patient remained hemodynamically stable and on room air during hospital stay and did not meet criteria for steroids, remdesivir or Actemra. Patient was discharged home on p.o. antibiotics and was instructed to follow-up with pulmonology and oncology after obtaining PET scan in 7 days.         Physical Exam Performed:     /78 Pulse 76   Temp 99.1 °F (37.3 °C) (Oral)   Resp 20   Ht 6' 2\" (1.88 m)   Wt 261 lb 12.8 oz (118.8 kg)   SpO2 93%   BMI 33.61 kg/m²     General appearance:  No apparent distress, appears stated age and cooperative. Eyes: Sclera clear. Pupils equal.  ENT: Moist oral mucosa. Trachea midline, no adenopathy. Cardiovascular: Regular rhythm, normal S1, S2. No murmur. No edema in lower extremities  Respiratory:Not usingaccessory muscles. Good inspiratory effort. Clear to auscultation bilaterally, no wheeze or crackles. GI: Abdomen soft, no tenderness, not distended, normal bowel sounds  Musculoskeletal: Normal ROM, No cyanosis in digits. Neurology: CN 2-12 grossly intact. No speech or motor deficits  Psych: Normal affect. Alert and oriented in time, place and person  Skin: Warm, dry, normal turgor      Labs: For convenience and continuity at follow-up the following most recent labs are provided:      CBC:    Lab Results   Component Value Date    WBC 4.9 01/10/2022    HGB 12.1 01/10/2022    HCT 36.3 01/10/2022     01/10/2022       Renal:    Lab Results   Component Value Date     01/10/2022    K 3.6 01/10/2022    CL 97 01/10/2022    CO2 27 01/10/2022    BUN 9 01/10/2022    CREATININE 0.6 01/10/2022    CALCIUM 8.6 01/10/2022         Significant Diagnostic Studies    Radiology:   XR CHEST PORTABLE   Final Result   No evidence of delayed pneumothorax or effusion following right upper lung   cavitary lesion aspiration and biopsy. XR CHEST 1 VIEW   Final Result   No pneumothorax immediately following CT-guided aspiration and core biopsies   of the cavitary right upper lung mass. Primary considerations include   cavitary lung carcinoma or possible lung abscess. Secondary infection of a cavitary necrotic lesion considered unlikely.          CT GUIDED NEEDLE PLACEMENT   Final Result   Successful CT guided cavity aspiration of the cavitary lesion followed by   core biopsies of the solid periphery of the lesion. Pathology is pending. CT NEEDLE BIOPSY LUNG PERCUTANEOUS   Final Result   Successful CT guided cavity aspiration of the cavitary lesion followed by   core biopsies of the solid periphery of the lesion. Pathology is pending. CT CHEST PULMONARY EMBOLISM W CONTRAST   Final Result   No evidence of a pulmonary embolus. Mildly dilated and atherosclerotic thoracic aorta with no aneurysm or   dissection. Large 8 cm necrotic mass along the right upper lobe which is not   significantly changed in size. There is increasing necrosis in the mass. Suggest PET scan correlation. Enlarging pulmonary nodules in both lungs which probably represents   progressive metastatic disease      Slowly resolving postobstructive infiltrates along the right upper lobe mass      Mildly enlarged right hilar lymph nodes which are unchanged. .  Suggest PET-CT   correlation. CT HEAD WO CONTRAST   Final Result   No acute intracranial abnormality. Probable severe chronic maxillary sinusitis on the left. Recommend follow-up. Consults:     IP CONSULT TO PULMONOLOGY  IP CONSULT TO ONCOLOGY    Disposition: Home    Condition at Discharge: Stable     Discharge Instructions/Follow-up:   Follow up with your PCP within 7days of discharge. Follow up with oncology as instructed; PET scan in 7 days  Follow up with pulmonology as instructed   Quarantine yourself at home for total 10 days since the start of symptoms. Take all your medications as prescribed. Keep dressing on for 3 days.        Discharge Medications:     Discharge Medication List as of 1/10/2022  6:09 PM           Details   amoxicillin-clavulanate (AUGMENTIN) 875-125 MG per tablet Take 1 tablet by mouth every 12 hours for 5 days, Disp-10 tablet, R-0Normal              Details   albuterol sulfate HFA (VENTOLIN HFA) 108 (90 Base) MCG/ACT inhaler Inhale 2 puffs into the lungs 4 times daily as needed for Wheezing, Disp-54 g, R-1Normal      finasteride (PROSCAR) 5 MG tablet Take 1 tablet by mouth daily, Disp-30 tablet, R-11Normal      clobetasol (TEMOVATE) 0.05 % cream Apply topically daily Apply topically once daily, Topical, DAILY Starting Tue 8/24/2021, Disp-60 g, R-11, CONSTANZA, Normal      tamsulosin (FLOMAX) 0.4 MG capsule Take 1 capsule by mouth daily, Disp-30 capsule, R-11Normal      naproxen (NAPROSYN) 500 MG tablet Take 1 tablet by mouth 2 times daily (with meals), Disp-60 tablet, R-3Normal               The patient was seen and examined on day of discharge and this discharge summary is in conjunction with any daily progress note from day of discharge. Time Spent on discharge is 45 minutes  in the examination, evaluation, counseling and review of medications and discharge plan. Note that greater  than 30 minutes was spent in preparing discharge papers, discussing discharge with patient, medication review, etc.     Signed:    Asia Thomas MD   1/16/2022      Thank you WESLY Thomas for the opportunity to be involved in this patient's care. If you have any questions or concerns please feel free to contact me at [phone].

## 2022-01-18 ENCOUNTER — TELEPHONE (OUTPATIENT)
Dept: PULMONOLOGY | Age: 62
End: 2022-01-18

## 2022-01-18 ENCOUNTER — TELEPHONE (OUTPATIENT)
Dept: INFECTIOUS DISEASES | Age: 62
End: 2022-01-18

## 2022-01-18 DIAGNOSIS — J18.9 PNEUMONIA WITH CAVITY OF LUNG: Primary | ICD-10-CM

## 2022-01-18 DIAGNOSIS — J98.4 PNEUMONIA WITH CAVITY OF LUNG: Primary | ICD-10-CM

## 2022-01-18 LAB
Lab: NORMAL
REPORT: NORMAL
THIS TEST SENT TO: NORMAL

## 2022-01-21 ENCOUNTER — OFFICE VISIT (OUTPATIENT)
Dept: PULMONOLOGY | Age: 62
End: 2022-01-21
Payer: COMMERCIAL

## 2022-01-21 VITALS — OXYGEN SATURATION: 98 % | HEART RATE: 78 BPM

## 2022-01-21 DIAGNOSIS — A43.0 NOCARDIAL PNEUMONIA (HCC): Primary | ICD-10-CM

## 2022-01-21 PROCEDURE — 99214 OFFICE O/P EST MOD 30 MIN: CPT | Performed by: INTERNAL MEDICINE

## 2022-01-21 NOTE — PROGRESS NOTES
PULMONARY OFFICE FOLLOW UP NOTE    REASON FOR VISIT:   Chief Complaint   Patient presents with   Desiree Ards Dr Khadra Cohen and told no need for PET Scan        DATE OF VISIT: 1/21/2022    HISTORY OF PRESENT ILLNESS: 57y.o. year old male is here for hospital follow-up. He has past medical history of psoriasis. Patient was hospitalized in early January 2022 for not resolving pneumonia. He is a lifelong non-smoker. He presented to hospital with complaints of fever, cough productive of white sputum and hemoptysis for almost a month now. Patient symptoms started in early December. He had cough along with low-grade fever with night sweats. He was given outpatient Levaquin and steroid by his primary care physician on 12/14. Symptoms did not resolve. He underwent CT chest as outpatient on 12/23 that showed right upper lobe mass. Patient was referred to oncology by his primary care physician. He is also been complaining of hemoptysis which she described as blood mixed with sputum for the last 1 week. He saw Dr. Khadra Cohen recently who referred him to pulmonary. Patient had an appointment with pulmonary on 1/24/2022. However, symptoms did not resolve and were getting worse which prompted the patient to come to the hospital. He had a repeat CT chest that was negative for PE but showed the right upper lobe mass is not necrotic with right hilar lymphadenopathy. There is some element of postobstructive pneumonia along with bilateral pulm nodules. I personally reviewed and interpreted all images. In the hospital, patient was given vancomycin and Cefepime for 4 days and then discharged on Augmentin for another 5 days. Patient was also noted to be positive for COVID by rapid test.  For this reason, he could not undergo bronchoscopy. He underwent CT-guided biopsy that showed acute inflammation with necrosis and fibrosis. Stains were negative for fungal and AFB. 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   Patient has been Mother     High Cholesterol Mother        MEDICATIONS:     Current Outpatient Medications on File Prior to Visit   Medication Sig Dispense Refill    albuterol sulfate HFA (VENTOLIN HFA) 108 (90 Base) MCG/ACT inhaler Inhale 2 puffs into the lungs 4 times daily as needed for Wheezing 54 g 1    finasteride (PROSCAR) 5 MG tablet Take 1 tablet by mouth daily 30 tablet 11    clobetasol (TEMOVATE) 0.05 % cream Apply topically daily Apply topically once daily 60 g 11    tamsulosin (FLOMAX) 0.4 MG capsule Take 1 capsule by mouth daily 30 capsule 11     No current facility-administered medications on file prior to visit. ALLERGIES:   Allergies as of 01/21/2022    (No Known Allergies)      OBJECTIVE:   pulse is 78. His oxygen saturation is 98%. PHYSICAL EXAM:    CONSTITUTIONAL: He is a 57y.o.-year-old who appears his stated age. He is alert and oriented x 3 and in no acute distress. HEENT: PERRL. No scleral icterus. No thrush, atraumatic, normocephalic. NECK: Supple, without cervical or supraclavicular lymphadenopathy:  CARDIOVASCULAR: S1 S2 RRR. Without murmer  RESPIRATORY & CHEST: Lungs are clear to auscultation and percussion. No wheezing, no crackles. Good air movement  GASTROINTESTINAL & ABDOMEN: Soft, nontender, positive bowel sounds in all quadrants, non-distended, without hepatosplenomegaly. GENITOURINARY: Deferred. MUSCULOSKELETAL: No tenderness to palpation of the axial skeleton. There is no clubbing. No cyanosis. No edema of the lower extremities. SKIN OF BODY: No rash or jaundice. PSYCHIATRIC EVALUATION: Normal affect. Patient answers questions appropriately. HEMATOLOGIC/LYMPHATIC/ IMMUNOLOGIC: No palpable lymphadenopathy. NEUROLOGIC: Alert and oriented x 3. Groslly non-focal. Motor strength is 5+/5 in all muscle groups. The patient has a normal sensorium globally. ASSESSMENT AND PLAN:     1.   Large right upper lobe necrotic mass, nocardia infection  Patient CT chest shows large right upper lobe necrotic mass with some element of postobstructive pneumonia. Bilateral other lung nodules are also present along with mild right hilar lymphadenopathy. This mass has been present since 12/23/21 and is now necrotic. Patient failed to outpatient antibiotics. He underwent CT needle biopsy that showed acute inflammation with necrosis and fibrosis. Stains were negative for fungus and AFB. Culture growing nocardia abscesses. Patient has been referred to infectious disease, Dr. Ritchie Mast for further management. Return in about 3 months (around 4/21/2022). Sabina Cutler MD  Pulmonary Critical Care and Sleep Medicine  Electronically signed by Sabina Cutler MD on 1/21/2022 at 2:31 PM     This note was completed using dragon medical speech recognition software. Grammatical errors, random word insertions, pronoun errors and incomplete sentences are occasional consequences of this technology due to software limitations. If there are questions or concerns about the content of this note of information contained within the body of this dictation they should be addressed with the provider for clarification.

## 2022-01-24 ENCOUNTER — TELEPHONE (OUTPATIENT)
Dept: INFECTIOUS DISEASES | Age: 62
End: 2022-01-24

## 2022-01-24 LAB
ANAEROBIC CULTURE: ABNORMAL
GRAM STAIN RESULT: ABNORMAL
ORGANISM: ABNORMAL
WOUND/ABSCESS: ABNORMAL

## 2022-01-24 NOTE — TELEPHONE ENCOUNTER
----- Message from Daniela Amezquita MD sent at 1/21/2022  2:48 PM EST -----  Please create a new patient vv appointment for this patient for Monday Jan 24 at around noontime, if he's available. 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 He will likely need a PICC line placement and iv antibiotic initiation. Thanks!       DG

## 2022-01-24 NOTE — TELEPHONE ENCOUNTER
Tomorrow schedule appears already quite packed. I can do Wednesday around noontime or 1 PM, if he is available.

## 2022-01-26 ENCOUNTER — VIRTUAL VISIT (OUTPATIENT)
Dept: INFECTIOUS DISEASES | Age: 62
End: 2022-01-26
Payer: COMMERCIAL

## 2022-01-26 ENCOUNTER — TELEPHONE (OUTPATIENT)
Dept: INFECTIOUS DISEASES | Age: 62
End: 2022-01-26

## 2022-01-26 ENCOUNTER — OFFICE VISIT (OUTPATIENT)
Dept: FAMILY MEDICINE CLINIC | Age: 62
End: 2022-01-26
Payer: COMMERCIAL

## 2022-01-26 VITALS
TEMPERATURE: 97.7 F | HEIGHT: 74 IN | WEIGHT: 278 LBS | DIASTOLIC BLOOD PRESSURE: 82 MMHG | BODY MASS INDEX: 35.68 KG/M2 | OXYGEN SATURATION: 98 % | HEART RATE: 85 BPM | SYSTOLIC BLOOD PRESSURE: 133 MMHG

## 2022-01-26 DIAGNOSIS — A43.9 NOCARDIOSIS: Primary | ICD-10-CM

## 2022-01-26 DIAGNOSIS — A43.9 NOCARDIA INFECTION: ICD-10-CM

## 2022-01-26 DIAGNOSIS — E66.09 CLASS 2 OBESITY DUE TO EXCESS CALORIES WITH BODY MASS INDEX (BMI) OF 35.0 TO 35.9 IN ADULT, UNSPECIFIED WHETHER SERIOUS COMORBIDITY PRESENT: ICD-10-CM

## 2022-01-26 DIAGNOSIS — Z71.89 ENCOUNTER FOR MEDICATION COUNSELING: ICD-10-CM

## 2022-01-26 DIAGNOSIS — Z51.81 THERAPEUTIC DRUG MONITORING: ICD-10-CM

## 2022-01-26 DIAGNOSIS — N39.43 BENIGN PROSTATIC HYPERPLASIA WITH POST-VOID DRIBBLING: ICD-10-CM

## 2022-01-26 DIAGNOSIS — N40.1 BENIGN PROSTATIC HYPERPLASIA WITH POST-VOID DRIBBLING: ICD-10-CM

## 2022-01-26 DIAGNOSIS — A43.0 PULMONARY NOCARDIOSIS (HCC): ICD-10-CM

## 2022-01-26 DIAGNOSIS — Z86.16 HISTORY OF 2019 NOVEL CORONAVIRUS DISEASE (COVID-19): ICD-10-CM

## 2022-01-26 DIAGNOSIS — Z11.4 SCREENING FOR HIV (HUMAN IMMUNODEFICIENCY VIRUS): ICD-10-CM

## 2022-01-26 DIAGNOSIS — H26.9 CATARACT OF RIGHT EYE, UNSPECIFIED CATARACT TYPE: ICD-10-CM

## 2022-01-26 DIAGNOSIS — Z79.2 RECEIVING INTRAVENOUS ANTIBIOTIC TREATMENT AS OUTPATIENT: ICD-10-CM

## 2022-01-26 DIAGNOSIS — Z01.818 PREOP EXAMINATION: Primary | ICD-10-CM

## 2022-01-26 DIAGNOSIS — L40.9 PSORIASIS: ICD-10-CM

## 2022-01-26 PROCEDURE — 99205 OFFICE O/P NEW HI 60 MIN: CPT | Performed by: INTERNAL MEDICINE

## 2022-01-26 PROCEDURE — 99212 OFFICE O/P EST SF 10 MIN: CPT | Performed by: PHYSICIAN ASSISTANT

## 2022-01-26 RX ORDER — SULFAMETHOXAZOLE AND TRIMETHOPRIM 800; 160 MG/1; MG/1
1 TABLET ORAL EVERY 8 HOURS
Qty: 90 TABLET | Refills: 0 | Status: SHIPPED | OUTPATIENT
Start: 2022-01-26 | End: 2022-02-10 | Stop reason: SDUPTHER

## 2022-01-26 RX ORDER — GREEN TEA/HOODIA GORDONII 315-12.5MG
1 CAPSULE ORAL 2 TIMES DAILY
Qty: 60 TABLET | Refills: 2 | Status: SHIPPED | OUTPATIENT
Start: 2022-01-26 | End: 2022-04-11 | Stop reason: SDUPTHER

## 2022-01-26 RX ORDER — SODIUM CHLORIDE 9 MG/ML
25 INJECTION, SOLUTION INTRAVENOUS PRN
Status: DISCONTINUED | OUTPATIENT
Start: 2022-01-26 | End: 2022-01-29 | Stop reason: HOSPADM

## 2022-01-26 RX ORDER — SODIUM CHLORIDE 0.9 % (FLUSH) 0.9 %
5-40 SYRINGE (ML) INJECTION PRN
Status: DISCONTINUED | OUTPATIENT
Start: 2022-01-26 | End: 2022-01-29 | Stop reason: HOSPADM

## 2022-01-26 RX ORDER — MINOCYCLINE HYDROCHLORIDE 100 MG/1
100 CAPSULE ORAL 2 TIMES DAILY
Qty: 60 CAPSULE | Refills: 2 | Status: SHIPPED | OUTPATIENT
Start: 2022-01-26 | End: 2022-04-11 | Stop reason: SDUPTHER

## 2022-01-26 RX ORDER — LIDOCAINE HYDROCHLORIDE 10 MG/ML
5 INJECTION, SOLUTION EPIDURAL; INFILTRATION; INTRACAUDAL; PERINEURAL ONCE
Status: DISCONTINUED | OUTPATIENT
Start: 2022-01-26 | End: 2022-01-29 | Stop reason: HOSPADM

## 2022-01-26 RX ORDER — SODIUM CHLORIDE 0.9 % (FLUSH) 0.9 %
5-40 SYRINGE (ML) INJECTION EVERY 12 HOURS SCHEDULED
Status: DISCONTINUED | OUTPATIENT
Start: 2022-01-26 | End: 2022-01-29 | Stop reason: HOSPADM

## 2022-01-26 SDOH — ECONOMIC STABILITY: FOOD INSECURITY: WITHIN THE PAST 12 MONTHS, THE FOOD YOU BOUGHT JUST DIDN'T LAST AND YOU DIDN'T HAVE MONEY TO GET MORE.: NEVER TRUE

## 2022-01-26 SDOH — ECONOMIC STABILITY: FOOD INSECURITY: WITHIN THE PAST 12 MONTHS, YOU WORRIED THAT YOUR FOOD WOULD RUN OUT BEFORE YOU GOT MONEY TO BUY MORE.: NEVER TRUE

## 2022-01-26 ASSESSMENT — ENCOUNTER SYMPTOMS
BACK PAIN: 0
COUGH: 1
SORE THROAT: 0
EYE REDNESS: 0
EYE DISCHARGE: 0
WHEEZING: 0
DIARRHEA: 0
ABDOMINAL PAIN: 0
RHINORRHEA: 0
CONSTIPATION: 0
TROUBLE SWALLOWING: 0
NAUSEA: 0
SHORTNESS OF BREATH: 0

## 2022-01-26 ASSESSMENT — SOCIAL DETERMINANTS OF HEALTH (SDOH): HOW HARD IS IT FOR YOU TO PAY FOR THE VERY BASICS LIKE FOOD, HOUSING, MEDICAL CARE, AND HEATING?: NOT HARD AT ALL

## 2022-01-26 NOTE — PATIENT INSTRUCTIONS
Bassem Colon was seen today for pre-op exam.    Diagnoses and all orders for this visit:    Preop examination    Cataract of right eye, unspecified cataract type    Nocardia infection       Cleared for surgery.

## 2022-01-26 NOTE — TELEPHONE ENCOUNTER
Spoke withpt and learned [facility] closest to pt and any time works for pt  Pt scheduled for fu at this time - /10 at [address] [address] contacted to schedule PICC placement - tomorrow 1100 arrive at 1045  Then spoke to [facility] and pt is to have first dose of 2GM Ceftriaxone after PICC placement.   Pt is scheduled for 1200    Orders faxed per department request    Pt notified of PICC placement and infusion tomorrow starting at [phone]  Pt verbalized understanding  Pt also given number to [facility] to schedule MRI Brain and to contact our office if this cannot be scheduled by end of next week  Dr Tonja Peña will enter as STAT if cannot be done by end  Of next week    Faxed info to [employer]  Lab orders entered

## 2022-01-26 NOTE — PROGRESS NOTES
Preoperative Consultation    Valerie Chahal  YOB: 1960    This patient presents to the office today for a preoperative consultation at the request of surgeon, Dr. Jamel Hawley, who plans on performing right cataract repair on February 16 at [facility]. He has had the cataract for about a year but more recently having vision changes. He has no other significant medical history that would hinder him from having surgery. Currently has Nocardia, speaking with Infectious Disease today to go over treatment plan.     Planned anesthesia: Local and IV sedation   Known anesthesia problems: None   Bleeding risk: No recent or remote history of abnormal bleeding  Personal or FH of DVT/PE: No      Patient Active Problem List   Diagnosis    Benign prostatic hyperplasia with post-void dribbling    Urinary frequency    Psoriasis    Pneumonia     Past Surgical History:   Procedure Laterality Date    COLONOSCOPY      CT NEEDLE BIOPSY LUNG PERCUTANEOUS  1/10/2022    CT NEEDLE BIOPSY LUNG PERCUTANEOUS 1/10/2022 F CT SCAN    ESOPHAGUS SURGERY  2019    hernia    FOOT SURGERY  2019    bilateraly hammer toes    UPPER GASTROINTESTINAL ENDOSCOPY         No Known Allergies  Outpatient Medications Marked as Taking for the 1/26/22 encounter (Office Visit) with WESLY Pisano   Medication Sig Dispense Refill    finasteride (PROSCAR) 5 MG tablet Take 1 tablet by mouth daily 30 tablet 11    clobetasol (TEMOVATE) 0.05 % cream Apply topically daily Apply topically once daily 60 g 11    tamsulosin (FLOMAX) 0.4 MG capsule Take 1 capsule by mouth daily 30 capsule 11       Social History     Tobacco Use    Smoking status: Never Smoker    Smokeless tobacco: Never Used   Substance Use Topics    Alcohol use: Yes     Comment: occasionally     Family History   Problem Relation Age of Onset    High Blood Pressure Mother     High Cholesterol Mother        Review of Systems  A comprehensive review of systems was negative except for what was noted in the HPI. Physical Exam   /82   Pulse 85   Temp 97.7 °F (36.5 °C)   Ht 6' 2\" (1.88 m)   Wt 278 lb (126.1 kg)   SpO2 98%   BMI 35.69 kg/m²   Weight: 278 lb (126.1 kg)   Constitutional: He is oriented to person, place, and time. He appears well-developed and well-nourished. No distress. HENT:   Head: Normocephalic and atraumatic. Mouth/Throat: Uvula is midline, oropharynx is clear and moist and mucous membranes are normal.   Eyes: Conjunctivae and EOM are normal. Pupils are equal, round, and reactive to light. Neck: Trachea normal and normal range of motion. Neck supple. No JVD present. Carotid bruit is not present. No mass and no thyromegaly present. Cardiovascular: Normal rate, regular rhythm, normal heart sounds and intact distal pulses. Exam reveals no gallop and no friction rub. No murmur heard. Pulmonary/Chest: Effort normal and breath sounds normal. No respiratory distress. He has no wheezes. He has no rales. Abdominal: Soft. Normal aorta and bowel sounds are normal. He exhibits no distension and no mass. There is no hepatosplenomegaly. No tenderness. Musculoskeletal: He exhibits no edema and no tenderness. Neurological: He is alert and oriented to person, place, and time. He has normal strength. No cranial nerve deficit or sensory deficit. Coordination and gait normal.   Skin: Skin is warm and dry. No rash noted. No erythema. EKG Interpretation:  NA  Lab Review No       Assessment:       Fernando Talley was seen today for pre-op exam.    Diagnoses and all orders for this visit:    Preop examination    Cataract of right eye, unspecified cataract type    Nocardia infection              Plan:     1. Preoperative workup as follows: none  2. Change in medication regimen before surgery: Hold all medications on morning of surgery  3. No contraindications to planned surgery, cleared for surgery.    4.Medical decision making of moderate complexity. Note electronically signed by provider.

## 2022-01-26 NOTE — PROGRESS NOTES
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 pleasant male patient, who had a virtual visit with me today as a new patient. History was obtained from chart review and the patient. The patient had a virtual visit with me today for above mentioned complaints. The patient has been referred to me by Dr. Jhon Santiago, pulmonology for concerns of pulmonary nocardiosis. The patient had developed pneumonia in early December of 2021 with cough and low-grade fever and night sweats for which he received an outpatient course of levofloxacin and steroids from his PCP that was started on 12/14/2021 without much improvement in symptoms. He had a CT scan of the chest done as an outpatient on 12/23/21 which showed a right upper lobe mass for which he was referred to Dr. Lance Cameron, oncology, who referred the patient to pulmonology. The patient was admitted in the meanwhile on 1/6/2022 for concerns for pneumonia and was seen by pulmonology  on 1/7/2022. The patient had CT angiogram scan of his chest done on January 6, 2022, which was negative for any pulmonary embolism. However, the right upper lobe mass  was seen again at that time. The mass was developing a central necrosis. The patient was subsequently sent for a CT-guided biopsy of the lung mass. The biopsy tissue culture grew nocardia abscesses. I was contacted by pulmonology and patient was given an urgent video appointment today        Past Medical History:   Past medical  history wasreviewed by me during this visit in detail.     Past Medical History:   Diagnosis Date    BPH (benign prostatic hyperplasia)     Psoriasis        Past Surgical History:  Past surgical history was reviewed by me during this visit in detail. Past Surgical History:   Procedure Laterality Date    COLONOSCOPY      CT NEEDLE BIOPSY LUNG PERCUTANEOUS  1/10/2022    CT NEEDLE BIOPSY LUNG PERCUTANEOUS 1/10/2022 [facility] CT SCAN    ESOPHAGUS SURGERY  2019    hernia    FOOT SURGERY  2019    bilateraly hammer toes    UPPER GASTROINTESTINAL ENDOSCOPY         Current Medications: All medicationswere reviewed by me during this visit  Current Outpatient Medications   Medication Sig Dispense Refill    sulfamethoxazole-trimethoprim (BACTRIM DS;SEPTRA DS) 800-160 MG per tablet Take 1 tablet by mouth every 8 hours 90 tablet 0    minocycline (MINOCIN;DYNACIN) 100 MG capsule Take 1 capsule by mouth 2 times daily 60 capsule 2    Probiotic Acidophilus (FLORANEX) TABS Take 1 tablet by mouth 2 times daily 60 tablet 2    finasteride (PROSCAR) 5 MG tablet Take 1 tablet by mouth daily 30 tablet 11    clobetasol (TEMOVATE) 0.05 % cream Apply topically daily Apply topically once daily 60 g 11    tamsulosin (FLOMAX) 0.4 MG capsule Take 1 capsule by mouth daily 30 capsule 11    albuterol sulfate HFA (VENTOLIN HFA) 108 (90 Base) MCG/ACT inhaler Inhale 2 puffs into the lungs 4 times daily as needed for Wheezing 54 g 1     No current facility-administered medications for this visit. Facility-Administered Medications Ordered in Other Visits   Medication Dose Route Frequency Provider Last Rate Last Admin    lidocaine PF 1 % injection 5 mL  5 mL IntraDERmal Once Candido Reyez MD        sodium chloride flush 0.9 % injection 5-40 mL  5-40 mL IntraVENous 2 times per day Candido Reyez MD        sodium chloride flush 0.9 % injection 5-40 mL  5-40 mL IntraVENous PRN Obdulio Padilla MD        0.9 % sodium chloride infusion  25 mL IntraVENous PRN Candido Reyez MD               Family history:  All family history was reviewed by me today    Family History   Problem Relation Age of Onset    High Blood Pressure Mother     High Cholesterol Mother        No family history of immunocompromising or autoimmune conditions. No h/o TBin in family or contacts      REVIEW OF SYSTEMS:      Review of Systems   Constitutional: Positive for fatigue. Negative for chills, diaphoresis and fever. HENT: Negative for ear discharge, ear pain, rhinorrhea, sore throat and trouble swallowing. Eyes: Negative for discharge and redness. Respiratory: Positive for cough. Negative for shortness of breath and wheezing. Cardiovascular: Negative for chest pain and leg swelling. Gastrointestinal: Negative for abdominal pain, constipation, diarrhea and nausea. Endocrine: Negative for polyuria. Genitourinary: Negative for dysuria, flank pain, frequency, hematuria and urgency. Musculoskeletal: Negative for back pain and myalgias. Skin: Negative for rash. Neurological: Negative for dizziness, seizures and headaches. Hematological: Does not bruise/bleed easily. Psychiatric/Behavioral: Negative for hallucinations and suicidal ideas. All other systems reviewed and are negative. PHYSICAL EXAM:      There were no vitals filed for this visit. Physical Exam  Constitutional:       General: He is not in acute distress. Appearance: Normal appearance. He is not ill-appearing or toxic-appearing. Comments: The patient was seen today via virtual video visit   HENT:      Head: Normocephalic. Right Ear: External ear normal.      Left Ear: External ear normal.      Nose: Nose normal.      Mouth/Throat:      Pharynx: No oropharyngeal exudate. Eyes:      General: No scleral icterus. Right eye: No discharge. Left eye: No discharge. Extraocular Movements: Extraocular movements intact. Pulmonary:      Effort: Pulmonary effort is normal.   Musculoskeletal:         General: No swelling. Normal range of motion. Cervical back: Normal range of motion. Skin:     Coloration: Skin is not jaundiced. Findings: No bruising or erythema. Neurological:      Mental Status: He is alert and oriented to person, place, and time. Mental status is at baseline. Gait: Gait normal.   Psychiatric:         Mood and Affect: Mood normal.         Behavior: Behavior normal.         Thought Content: Thought content normal.         Judgment: Judgment normal.              DATA:  All available lab data was reviewed by me during this visit    Last CBC:  Lab Results   Component Value Date    WBC 4.9 01/10/2022    HGB 12.1 (L) 01/10/2022    HCT 36.3 (L) 01/10/2022    MCV 87.4 01/10/2022     01/10/2022    LYMPHOPCT 26.7 01/10/2022    RBC 4.15 (L) 01/10/2022    MCH 29.1 01/10/2022    MCHC 33.3 01/10/2022    RDW 14.7 01/10/2022       Last BMP:  Lab Results   Component Value Date     01/10/2022    K 3.6 01/10/2022    CL 97 01/10/2022    CO2 27 01/10/2022    BUN 9 01/10/2022    CREATININE 0.6 01/10/2022    GLUCOSE 104 01/10/2022    CALCIUM 8.6 01/10/2022        Hepatic Function Panel:   Lab Results   Component Value Date    ALKPHOS 43 01/10/2022    ALT 24 01/10/2022    AST 28 01/10/2022    PROT 6.6 01/10/2022    BILITOT 0.3 01/10/2022    LABALBU 2.8 01/10/2022       Last CK: No results found for: CKTOTAL    Last ESR:  Lab Results   Component Value Date    SEDRATE 98 (H) 01/06/2022       Last CRP:  Lab Results   Component Value Date    .5 (H) 01/06/2022       1. Imaging: All pertinent images and reports for the current visit were reviewed by me during this visit. Outside records:    Labs, Microbiology, Radiology and pertinentresults from Care everywhere, if available, were reviewed as a part of the consultation.     Problem list:       Patient Active Problem List   Diagnosis Code    Benign prostatic hyperplasia with post-void dribbling N40.1, N39.43    Urinary frequency R35.0    Psoriasis L40.9    Pneumonia J18.9       Microbiology:       All available micro data was reviewed by me during this visit    Contains abnormal data Culture, Anaerobic and Aerobic  Order: [id number]   Status: Final result     Visible to patient: Yes (seen)     Next appt: 01/27/2022 at 11:00 AM in IP Unit (SCHEDULE, [facility] SAME DAY SURGERY)    Specimen Information: Lung         0 Result Notes    Component 1/10/22 1340 Resulting Agency   Gram Stain Result 4+ WBC's (Polymorphonuclear)   No organisms seen  [address] [facility]   Anaerobic Culture No anaerobes isolated  [address] [facility]   Organism Nocardia abscessus Abnormal   [address] [facility]   WOUND/ABSCESS Light growth   Identification completed by Willy Bah completed by [facility]  [address] [facility]             Narrative  Performed by: [address] [facility]  ORDER#: [id number]                          ORDERED BY: Kelly [place]: Lung                               COLLECTED:  01/10/22 13:40   ANTIBIOTICS AT [facility]. :                      RECEIVED :  01/21/22 13:31   Performed at:   [facility] [address], [place], [address]   Phone [phone]      Specimen Collected: 01/10/22 13:40 Last Resulted: 01/24/22 12:35               Allergies and immunizations:       Known drug Allergies: All allergies data was reviewed by me during this visit  Patient has no known allergies. Immunizations: All immunizations were reviewed byme in detail. Immunization History   Administered Date(s) Administered    Tdap (Boostrix, Adacel) 09/30/2015, 06/25/2019       Social History:  Allsocial and epidemiologic history was reviewed and updated be me in detail today.     Social History     Socioeconomic History    Marital status:      Spouse name: Not on file    Number of children: Not on file    Years of education: Not on file    Highest education level: Not on file   Occupational History    Not on file   Tobacco Use    Smoking status: Never Smoker    Smokeless tobacco: Never Used   Substance and Sexual Activity    Alcohol use: Yes     Comment: occasionally    Drug use: Never    Sexual activity: Not on file   Other Topics Concern    Not on file   Social History Narrative    Not on file     Social Determinants of Health     Financial Resource Strain: Low Risk     Difficulty of Paying Living Expenses: Not hard at all   Food Insecurity: No Food Insecurity    Worried About Running Out of Food in the Last Year: Never true    [address] in the Last Year: Never true   Transportation Needs:     Lack of Transportation (Medical): Not on file    Lack of Transportation (Non-Medical): Not on file   Physical Activity:     Days of Exercise per Week: Not on file    Minutes of Exercise per Session: Not on file   Stress:     Feeling of Stress : Not on file   Social Connections:     Frequency of Communication with Friends and Family: Not on file    Frequency of Social Gatherings with Friends and Family: Not on file    Attends Restoration Services: Not on file    Active Member of [address] [place] [address] or Organizations: Not on file    Attends Club or Organization Meetings: Not on file    Marital Status: Not on file   Intimate Partner Violence:     Fear of Current or Ex-Partner: Not on file    Emotionally Abused: Not on file    Physically Abused: Not on file    Sexually Abused: Not on file   Housing Stability:     Unable to Pay for Housing in the Last Year: Not on file    Number of [place] in the Last Year: Not on file    Unstable Housing in the Last Year: Not on file       COVID VACCINATION AND LAB RESULT RECORDS:     Internal Administration   First Dose      Second Dose           Last COVID Lab SARS-CoV-2 (no units)   Date Value   12/14/2021 Not Detected     SARS-CoV-2, NAAT (no units)   Date Value   01/10/2022 DETECTED (AA)            IMPRESSION:    The patient is a 57 y. o.old male who  has a past medical history of BPH (benign prostatic hyperplasia) and Psoriasis. was seen today for following problems:    1. Nocardiosis    2. Screening for HIV (human immunodeficiency virus)    3. Psoriasis    4. Benign prostatic hyperplasia with post-void dribbling    5. History of 2019 novel coronavirus disease (COVID-19)    6. Therapeutic drug monitoring    7. Receiving intravenous antibiotic treatment as outpatient    8. Encounter for medication counseling    9. Pulmonary nocardiosis ([facility] [address]    10. Class 2 obesity due to excess calories with body mass index (BMI) of 35.0 to 35.9 in adult, unspecified whether serious comorbidity present        Discussion:      I obtained a detailed history from the patient. The patient started having pneumonia issues in the December 2021. He also was diagnosed with COVID-19 infection in January 10, 2022 during his hospitalization. The patient is new to ID service. The patient was originally scheduled for a diagnostic bronchoscopy, however, as his COVID 19 screening test came back positive on January 10, 2022, he had a getting a CT-guided biopsy of the right upper lobe lung mass done on January 10, 2022. 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 I obtained a detailed history from the patient. The patient has worked as a  in [employer] for several years. He did not have any particular dust exposure at that time, however, he did do a construction project for home in [facility] in June 2021, from where he was exposed to a lots of construction dust.    Nocardia is a ubiquitous environmental organism, mainly found in soil and water. Typical Gram stain picture of nocardia is described as gram variable, weakly acid-fast, beaded branching rods. Nocardia has several hundred subspecies and the subspecies are typically identified by 16 S ribosomal RNA sequencing technology. In the case of Mr. 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 I explained to the patient that the mainstay of treatment for most nocardia infections is long-term Bactrim at a higher dose. Nocardia is typically treated with a combination of 2-3 antibiotics, including an intravenous antibiotic and antibiotic therapy is slowly de-escalated to Bactrim alone after few months, if patient tolerates Bactrim at higher doses well. I also explained to the patient that most Nocardia infections occur in immunocompromised patients. Although, immunocompetent patients with significant exposure can also developed nocardia infection. The patient has psoriasis, however, he is not on any immunosuppression    I had a detailed discussion with the patient today and also showed him the images of CT scans of his chest and lung window including the CT scan of the chest from 12/23/2021, 1/6/2022 as well as 1/10/2022 with the help of Doxy. me screen sharing feature. I independently interpreted the CT scan results in plain language for the patient. The CT scan of the chest on 12/23/21 showed right upper lobe masslike lesion which developed central necrosis in the CT angiogram the chest done on 1/6/2022. There was no obvious pulmonary emboli. I also explained to the patient that Nocardia are neurotropic bacteria, and can cause brain lesions or brain abscesses. I also explained the challenges with the long-term use of higher doses of Bactrim needed for nocardia treatment. These include bone marrow suppression, hyperkalemia, renal dysfunction, intractable nausea and vomiting to name some common issues. I also explained to the patient that nocardiosis requires at least 1 year of treatment and in some cases the treatment may need to be continued even beyond that. He verbalized understanding and was given the opportunity to ask questions. Patient's last CBC and CMP were done on January 10, 2022.   The patient has a baseline normocytic anemia with hemoglobin of 12.1 on January 10. His serum creatinine was okay at 0.6 on 1/10/2022. Liver functions were okay at that time. RECOMMENDATIONS:      Diagnostic Workup:    · Will order MRI of the brain with and without contrast to look for any occult nocardia lesions in the brain  · I will also order some basic immunodeficiency work-up including baseline HIV screen, immunoglobulin G and M levels and CD4 and CD8 cell counts  · Will order QuantiFERON blood test for thoroughness of work-up  · We will order serum and urine histoplasma antigen and fungal serologies by immunodiffusion and complement fixation for thoroughness as well as a fungal blood culture for thoroughness of work-up  · Continue to follow fever curve at home. · I have given verbal orders for weekly CBC and CMP as long as the patient is on IV ceftriaxone    Antimicrobials:    · Bactrim is the mainstay of treatment for nocardia. Most isolates of Nocardia abscessus arealso sensitive to minocycline, ceftriaxone, tobramycin, imipenem, but several of the nocardia abscesses isolates can be ciprofloxacin resistant ( Reference: Genaro Sheth. Susceptibility profiles of Nocardia isolates based on current taxonomy. Antimicrob Agents Chemother. 2014;58(2):795-800. doi: 10.1128/AAC.83932-41. Epub 2013 Nov 18. PMID: 31457291; PMCID: TOT3827780.)  · Will order Bactrim DS 1 tablet every 8 hour for the patient for now. If the patient tolerates this dose okay, goal will be to uptitrate the Bactrim to two double strength tablet every 8 hours in coming weeks  · Will order oral minocycline 100 mg every 12 hour  · I am ordering a PICC line placement for the patient. I have given verbal orders to my infusion coordinator to start the patient on IV ceftriaxone 2 g every 24 hour with a planned stop date of March 31, 2022.   If the MRI of the brain shows any brain involvement with nocardia, I may need to increase the ceftriaxone dose to 2 g every 12 hours. · Explained to the patient that human to human transmission of nocardiosis typically does not occur  · Have also explained to him that I have requested formal susceptibility testing on the nocardia isolate from his lungs. That testing will be done at [facility] and may take several weeks to be completed. If the susceptibility testing shows any resistance to above antibiotics, I may need to change the antibiotics based on the susceptibility data. He understands, however, to while the pulmonary nocardiosis infection from expanding unchecked in the meanwhile, he would like to start the treatment for it right away. · I am also ordering oral probiotic twice daily for the patient. He was advised to watch for any new fevers or diarrhea  · We will do a close follow-up for the patient. We will plan to do another video visit in 3 weeks to see how patient is doing. He was advised to continue watching for any worsening respiratory status. · Advised the patient to maintain good oral hydration provided possibility of renal issues with Bactrim. This would also help lower the chances of other common side effects like nausea and vomiting with Bactrim. · Other two antibiotic should be well-tolerated. · Discussed the importance of antibiotic compliance. The patient appears quite motivated and wants to start the treatment right away  · I also explained to the patient that if he does not show much response to the antibiotic treatment alone, partial surgical lobectomy to reduce the infection burden may also need to be considered in coming months. He understands  · Continue to watch for any new fever or diarrhea  · I have asked my office staff to help the patient schedule the MRI of his brain.   · Discussed with patient the strategies to stay safe from COVID 19 exposures including safe social distancing, frequent and proper hand hygiene when outside and using 3 layered mouth and nose coverings/facemasks when outside their home. Labs ordered today inEPIC:    Orders Placed This Encounter   Procedures    Quantiferon, Incubated     Standing Status:   Future     Standing Expiration Date:   1/26/2023    Culture, Fungus, Blood     Standing Status:   Future     Standing Expiration Date:   1/26/2023    MRI BRAIN W WO CONTRAST     Standing Status:   Future     Standing Expiration Date:   1/26/2023     Order Specific Question:   Reason for exam:     Answer:   Nocardiosis, r/o any brain lesions    HIV Screen     Standing Status:   Future     Standing Expiration Date:   1/26/2023    IGG, IGA, IGM     Standing Status:   Future     Standing Expiration Date:   1/26/2023    Flow Cytometry T-Bowen CD4/CD8 Blood     Standing Status:   Future     Standing Expiration Date:   1/26/2023    FUNGAL ANTIBODIES BY ID     Standing Status:   Future     Standing Expiration Date:   1/26/2023    FUNGAL ANTIBODIES QUANT BY CF     Standing Status:   Future     Standing Expiration Date:   1/26/2023    HISTOPLASMA ANTIGEN, SERUM     Standing Status:   Future     Standing Expiration Date:   1/26/2023    HISTOPLASMA ANTIGEN, URINE     Standing Status:   Future     Standing Expiration Date:   1/26/2023       Medications ordered today in EPIC:    Orders Placed This Encounter   Medications    sulfamethoxazole-trimethoprim (BACTRIM DS;SEPTRA DS) 800-160 MG per tablet     Sig: Take 1 tablet by mouth every 8 hours     Dispense:  90 tablet     Refill:  0    minocycline (MINOCIN;DYNACIN) 100 MG capsule     Sig: Take 1 capsule by mouth 2 times daily     Dispense:  60 capsule     Refill:  2    Probiotic Acidophilus (FLORANEX) TABS     Sig: Take 1 tablet by mouth 2 times daily     Dispense:  60 tablet     Refill:  2       Drug Monitoring:    · Monitor for antibiotic toxicity as follows: CBC, CMP  once a week to be collected every Monday or Tuesday. · Fax above results to [phone].     Patient education and counseling:    · The patient was educated in detail about the side-effects of variousantibiotics and things to watch for like new rashes, lip swelling, severe reaction, worsening diarrhea, break through fever etc.  · Patient was instructed to stop antibiotics and callour office if these problems were to occur, or go to nearest ER if experiencing severe symptoms. · Discussed patient's condition and what to expect. All of the patient's questions wereaddressed in a satisfactory manner and patient verbalized understanding all instructions. Doxycyline/minocycline related instructions: Take Doxycyline/minocycline with a full glass of water and stay upright or sitting up after taking it for 30 minutes as it can cause food pipe irritation (pill esophagitis). Use sunscreen when going in bright sun while on Doxycycline/minocycline as it can cause photosensitivity. Take 1 hour before or 2 hour after dairy, calcium, iron, magnesium, aluminum or zinc.        Follow-up:    · Follow-up with me in ID clinic or through video visit in 2-3 weeks    Mateo Lozada is a 57 y.o. male being evaluated by a Virtual Visit encounter to address concerns as mentioned above. A caregiver was present when appropriate. Due to this being a TeleHealth encounter ([facility]), evaluation of the following organ systems was limited: Vitals/Constitutional/EENT/Resp/CV/GI//MS/Neuro/Skin/Heme-Lymph-Imm. Pursuant to the emergency declaration under the [facility], [address] [place] [address] authority and the [employer] Act, this Virtual Visit was conducted with patient's (and/or legal guardian's) consent, to reduce the patient's risk of exposure to COVID-19 and provide necessary medical care.   The patient (and/or legal guardian) has also been advised to contact this office for worsening conditions or problems, and seek emergency medical treatment and/or call 911 if deemed necessary. Services were provided through an audio and/or video synchronous discussion virtually to substitute for in-person clinic visit. Patient and provider were located at their individual homes. --Renea Triplett MD on 1/26/2022 at 9:49 PM    An electronic signature was used to authenticate this note. Level of complexity of visit: High     Risk of Complications/Morbidity: High     · Illness(es)/ Infection present that pose threat to bodily function. · There is potential for severe exacerbation of infection/side effects of treatment. · Therapy requires intensive monitoring for antimicrobial agent toxicity. TIME SPENTTODAY:     - Spent over 62 minutes on visit (including interval history, physical exam, review of data including labs, cultures, imaging, development and implementation of treatmentplan and coordination of care). - Over 50% of time spent with pt counseling and education. Please note that this chart was generated using Dragon dictation software. Although every effort was made to ensure the accuracy of this automated transcription, some errors in transcription may have occurred inadvertently. If you may need any clarification, please do not hesitate to contact me through EPIC or at the phone number provided below with my electronic signature. Any pictures or media included in this note were obtained after taking informed verbal consent from the patient and with their approval to include those in the patient's medical record. Thankyou for involving me in the care of your patient. If you have any additional questions,please do not hesitate to contact me.       Renea Triplett MD, MPH, [address]  1/26/2022, 9:49 PM  [facility]   [facility], [address] [place] [address]  Office: [phone]  Fax: [phone]  Clinic days:  Tuesday & Thursday

## 2022-01-27 ENCOUNTER — HOSPITAL ENCOUNTER (OUTPATIENT)
Age: 62
Discharge: HOME | End: 2022-01-27
Payer: COMMERCIAL

## 2022-01-27 ENCOUNTER — HOSPITAL ENCOUNTER (OUTPATIENT)
Dept: ONCOLOGY | Age: 62
Setting detail: INFUSION SERIES
Discharge: HOME | End: 2022-01-27
Payer: COMMERCIAL

## 2022-01-27 ENCOUNTER — HOSPITAL ENCOUNTER (OUTPATIENT)
Dept: SURGERY | Age: 62
Setting detail: OUTPATIENT SURGERY
Discharge: HOME | End: 2022-01-27
Attending: INTERNAL MEDICINE | Admitting: INTERNAL MEDICINE
Payer: COMMERCIAL

## 2022-01-27 VITALS
RESPIRATION RATE: 16 BRPM | TEMPERATURE: 97.2 F | HEART RATE: 85 BPM | SYSTOLIC BLOOD PRESSURE: 122 MMHG | DIASTOLIC BLOOD PRESSURE: 82 MMHG

## 2022-01-27 DIAGNOSIS — A43.9 NOCARDIOSIS: ICD-10-CM

## 2022-01-27 DIAGNOSIS — Z11.4 SCREENING FOR HIV (HUMAN IMMUNODEFICIENCY VIRUS): ICD-10-CM

## 2022-01-27 LAB
A/G RATIO: 1.4 (ref 1.1–2.2)
ALBUMIN SERPL-MCNC: 4.2 G/DL (ref 3.4–5)
ALP BLD-CCNC: 75 U/L (ref 40–129)
ALT SERPL-CCNC: 20 U/L (ref 10–40)
ANION GAP SERPL CALCULATED.3IONS-SCNC: 14 MMOL/L (ref 3–16)
AST SERPL-CCNC: 17 U/L (ref 15–37)
BASOPHILS ABSOLUTE: 0.1 K/UL (ref 0–0.2)
BASOPHILS RELATIVE PERCENT: 0.9 %
BILIRUB SERPL-MCNC: 0.5 MG/DL (ref 0–1)
BUN BLDV-MCNC: 17 MG/DL (ref 7–20)
CALCIUM SERPL-MCNC: 9.5 MG/DL (ref 8.3–10.6)
CHLORIDE BLD-SCNC: 96 MMOL/L (ref 99–110)
CO2: 25 MMOL/L (ref 21–32)
CREAT SERPL-MCNC: 1 MG/DL (ref 0.8–1.3)
EOSINOPHILS ABSOLUTE: 0.3 K/UL (ref 0–0.6)
EOSINOPHILS RELATIVE PERCENT: 3.4 %
GFR AFRICAN AMERICAN: >60
GFR NON-AFRICAN AMERICAN: >60
GLUCOSE BLD-MCNC: 96 MG/DL (ref 70–99)
HCT VFR BLD CALC: 44.7 % (ref 40.5–52.5)
HEMOGLOBIN: 14.8 G/DL (ref 13.5–17.5)
IGA: 333 MG/DL (ref 70–400)
IGG: 1247 MG/DL (ref 700–1600)
IGM: 115 MG/DL (ref 40–230)
LYMPHOCYTES ABSOLUTE: 2.8 K/UL (ref 1–5.1)
LYMPHOCYTES RELATIVE PERCENT: 31.6 %
MCH RBC QN AUTO: 29.4 PG (ref 26–34)
MCHC RBC AUTO-ENTMCNC: 33.1 G/DL (ref 31–36)
MCV RBC AUTO: 88.9 FL (ref 80–100)
MONOCYTES ABSOLUTE: 1.3 K/UL (ref 0–1.3)
MONOCYTES RELATIVE PERCENT: 14.9 %
NEUTROPHILS ABSOLUTE: 4.4 K/UL (ref 1.7–7.7)
NEUTROPHILS RELATIVE PERCENT: 49.2 %
PDW BLD-RTO: 16.8 % (ref 12.4–15.4)
PLATELET # BLD: 244 K/UL (ref 135–450)
PMV BLD AUTO: 8.5 FL (ref 5–10.5)
POTASSIUM SERPL-SCNC: 5.1 MMOL/L (ref 3.5–5.1)
RBC # BLD: 5.03 M/UL (ref 4.2–5.9)
SODIUM BLD-SCNC: 135 MMOL/L (ref 136–145)
TOTAL PROTEIN: 7.3 G/DL (ref 6.4–8.2)
WBC # BLD: 8.9 K/UL (ref 4–11)

## 2022-01-27 PROCEDURE — 87103 BLOOD FUNGUS CULTURE: CPT

## 2022-01-27 PROCEDURE — 82784 ASSAY IGA/IGD/IGG/IGM EACH: CPT

## 2022-01-27 PROCEDURE — 86635 COCCIDIOIDES ANTIBODY: CPT

## 2022-01-27 PROCEDURE — 6360000002 HC RX W HCPCS: Performed by: INTERNAL MEDICINE

## 2022-01-27 PROCEDURE — 87390 HIV-1 AG IA: CPT

## 2022-01-27 PROCEDURE — 36415 COLL VENOUS BLD VENIPUNCTURE: CPT

## 2022-01-27 PROCEDURE — 86480 TB TEST CELL IMMUN MEASURE: CPT

## 2022-01-27 PROCEDURE — 86612 BLASTOMYCES ANTIBODY: CPT

## 2022-01-27 PROCEDURE — 86606 ASPERGILLUS ANTIBODY: CPT

## 2022-01-27 PROCEDURE — 96365 THER/PROPH/DIAG IV INF INIT: CPT

## 2022-01-27 PROCEDURE — 86701 HIV-1ANTIBODY: CPT

## 2022-01-27 PROCEDURE — C1751 CATH, INF, PER/CENT/MIDLINE: HCPCS

## 2022-01-27 PROCEDURE — 99211 OFF/OP EST MAY X REQ PHY/QHP: CPT

## 2022-01-27 PROCEDURE — 86702 HIV-2 ANTIBODY: CPT

## 2022-01-27 PROCEDURE — 87385 HISTOPLASMA CAPSUL AG IA: CPT

## 2022-01-27 PROCEDURE — 86360 T CELL ABSOLUTE COUNT/RATIO: CPT

## 2022-01-27 PROCEDURE — 2580000003 HC RX 258: Performed by: INTERNAL MEDICINE

## 2022-01-27 PROCEDURE — 80053 COMPREHEN METABOLIC PANEL: CPT

## 2022-01-27 PROCEDURE — 86698 HISTOPLASMA ANTIBODY: CPT

## 2022-01-27 PROCEDURE — 85025 COMPLETE CBC W/AUTO DIFF WBC: CPT

## 2022-01-27 PROCEDURE — 36569 INSJ PICC 5 YR+ W/O IMAGING: CPT

## 2022-01-27 RX ORDER — SODIUM CHLORIDE 9 MG/ML
INJECTION, SOLUTION INTRAVENOUS CONTINUOUS
Status: DISCONTINUED | OUTPATIENT
Start: 2022-01-27 | End: 2022-01-28 | Stop reason: HOSPADM

## 2022-01-27 RX ORDER — SODIUM CHLORIDE 0.9 % (FLUSH) 0.9 %
5-40 SYRINGE (ML) INJECTION 2 TIMES DAILY
Status: DISCONTINUED | OUTPATIENT
Start: 2022-01-27 | End: 2022-01-28 | Stop reason: HOSPADM

## 2022-01-27 RX ADMIN — SODIUM CHLORIDE: 9 INJECTION, SOLUTION INTRAVENOUS at 12:36

## 2022-01-27 RX ADMIN — Medication 10 ML: at 12:39

## 2022-01-27 RX ADMIN — CEFTRIAXONE 2000 MG: 2 INJECTION, POWDER, FOR SOLUTION INTRAMUSCULAR; INTRAVENOUS at 12:37

## 2022-01-27 NOTE — CONSULTS
PICC line education:    -Risks  -Benefits  -Alternatives  -Procedure    Discussed the above with patient, verbalized understanding, answered all questions. Provided with information on PICC care to review. PICC tip verified via 3[employer] (Ok to use). Reported off to patient's  Nurse SDS.

## 2022-01-27 NOTE — PROGRESS NOTES
Patient to infusion center for 1st dose rocephin. Patient tolerated infusion well. No signs/symptoms of reaction. Plan of care for daily home antibiotics reviewed, patient states someone is scheduled to come to his house today. PICC line flushed and capped. Patient to follow up with Dr. Jean Carr regarding any questions or concerns.

## 2022-01-28 ENCOUNTER — TELEPHONE (OUTPATIENT)
Dept: INFECTIOUS DISEASES | Age: 62
End: 2022-01-28

## 2022-01-28 LAB
HIV AG/AB: NORMAL
HIV ANTIGEN: NORMAL
HIV-1 ANTIBODY: NORMAL
HIV-2 AB: NORMAL

## 2022-01-28 NOTE — TELEPHONE ENCOUNTER
Pt called stating he is having Cataract surgery on 2/16 and he wanted you opinion on if he should have the surgery while on ATB?   Please advsie

## 2022-01-29 LAB
HISTOPLASMA ANTIGEN URINE INTERP: NOT DETECTED
HISTOPLASMA ANTIGEN URINE: NOT DETECTED NG/ML

## 2022-01-30 LAB
ASPERGILLUS ANTIBODY CF: NORMAL
BLASTOMYCES AB BY EIA, SERUM: 0.3 IV
COCCIDIOIDES ANTIBODY CF: NORMAL
HISTOPLASMA ANTIBODY MYCELIAL CF: NORMAL
HISTOPLASMA ANTIBODY YEAST CF: NORMAL

## 2022-01-31 ENCOUNTER — HOSPITAL ENCOUNTER (OUTPATIENT)
Age: 62
Setting detail: SPECIMEN
Discharge: HOME | End: 2022-01-31
Payer: COMMERCIAL

## 2022-01-31 LAB
A/G RATIO: 1.5 (ref 1.1–2.2)
ALBUMIN SERPL-MCNC: 4.4 G/DL (ref 3.4–5)
ALP BLD-CCNC: 82 U/L (ref 40–129)
ALT SERPL-CCNC: 20 U/L (ref 10–40)
ANION GAP SERPL CALCULATED.3IONS-SCNC: 13 MMOL/L (ref 3–16)
AST SERPL-CCNC: 15 U/L (ref 15–37)
BASOPHILS ABSOLUTE: 0.1 K/UL (ref 0–0.2)
BASOPHILS RELATIVE PERCENT: 1.1 %
BILIRUB SERPL-MCNC: 0.3 MG/DL (ref 0–1)
BUN BLDV-MCNC: 14 MG/DL (ref 7–20)
CALCIUM SERPL-MCNC: 9.6 MG/DL (ref 8.3–10.6)
CHLORIDE BLD-SCNC: 102 MMOL/L (ref 99–110)
CO2: 24 MMOL/L (ref 21–32)
CREAT SERPL-MCNC: 0.9 MG/DL (ref 0.8–1.3)
EOSINOPHILS ABSOLUTE: 0.6 K/UL (ref 0–0.6)
EOSINOPHILS RELATIVE PERCENT: 8 %
GFR AFRICAN AMERICAN: >60
GFR NON-AFRICAN AMERICAN: >60
GLUCOSE BLD-MCNC: 108 MG/DL (ref 70–99)
HCT VFR BLD CALC: 44.1 % (ref 40.5–52.5)
HEMOGLOBIN: 14.5 G/DL (ref 13.5–17.5)
HISTOPLASMA ANTIGEN, SERUM: NOT DETECTED
HISTOPLASMA INTERPETATION: NOT DETECTED
LYMPHOCYTES ABSOLUTE: 2.5 K/UL (ref 1–5.1)
LYMPHOCYTES RELATIVE PERCENT: 33.5 %
MCH RBC QN AUTO: 29.2 PG (ref 26–34)
MCHC RBC AUTO-ENTMCNC: 32.8 G/DL (ref 31–36)
MCV RBC AUTO: 89.2 FL (ref 80–100)
MONOCYTES ABSOLUTE: 0.8 K/UL (ref 0–1.3)
MONOCYTES RELATIVE PERCENT: 11.3 %
NEUTROPHILS ABSOLUTE: 3.5 K/UL (ref 1.7–7.7)
NEUTROPHILS RELATIVE PERCENT: 46.1 %
PDW BLD-RTO: 17 % (ref 12.4–15.4)
PLATELET # BLD: 196 K/UL (ref 135–450)
PMV BLD AUTO: 8.1 FL (ref 5–10.5)
POTASSIUM SERPL-SCNC: 4.4 MMOL/L (ref 3.5–5.1)
QUANTI TB GOLD PLUS: NEGATIVE
QUANTI TB1 MINUS NIL: 0 IU/ML (ref 0–0.34)
QUANTI TB2 MINUS NIL: 0 IU/ML (ref 0–0.34)
QUANTIFERON MITOGEN: >10 IU/ML
QUANTIFERON NIL: 0.03 IU/ML
RBC # BLD: 4.94 M/UL (ref 4.2–5.9)
SODIUM BLD-SCNC: 139 MMOL/L (ref 136–145)
TOTAL PROTEIN: 7.4 G/DL (ref 6.4–8.2)
WBC # BLD: 7.5 K/UL (ref 4–11)

## 2022-01-31 PROCEDURE — 36415 COLL VENOUS BLD VENIPUNCTURE: CPT

## 2022-01-31 PROCEDURE — 85025 COMPLETE CBC W/AUTO DIFF WBC: CPT

## 2022-01-31 PROCEDURE — 80053 COMPREHEN METABOLIC PANEL: CPT

## 2022-02-01 ENCOUNTER — HOSPITAL ENCOUNTER (OUTPATIENT)
Dept: MRI IMAGING | Age: 62
Discharge: HOME | End: 2022-02-01
Payer: COMMERCIAL

## 2022-02-01 DIAGNOSIS — A43.9 NOCARDIOSIS: ICD-10-CM

## 2022-02-01 PROCEDURE — A9579 GAD-BASE MR CONTRAST NOS,1ML: HCPCS | Performed by: INTERNAL MEDICINE

## 2022-02-01 PROCEDURE — 70553 MRI BRAIN STEM W/O & W/DYE: CPT

## 2022-02-01 PROCEDURE — 6360000004 HC RX CONTRAST MEDICATION: Performed by: INTERNAL MEDICINE

## 2022-02-01 RX ADMIN — GADOTERIDOL 20 ML: 279.3 INJECTION, SOLUTION INTRAVENOUS at 16:46

## 2022-02-02 ENCOUNTER — TELEPHONE (OUTPATIENT)
Dept: INFECTIOUS DISEASES | Age: 62
End: 2022-02-02

## 2022-02-02 DIAGNOSIS — G93.9 BRAIN LESION: Primary | ICD-10-CM

## 2022-02-02 DIAGNOSIS — A43.9 NOCARDIOSIS: ICD-10-CM

## 2022-02-02 LAB
ASPERGILLUS ANTIBODY ID: NORMAL
BLASTOMYCES ANTIBODY ID: NORMAL
COCCIDIOIDES ANTIBODY ID: NORMAL
HISTOPLASMA ABS, ID: NORMAL

## 2022-02-02 NOTE — TELEPHONE ENCOUNTER
LM for pt to c/b to see if he has gotten a chance to call and schedule test.    If not; check w/ pt and see what days will work for him to get schedule. Also; pt is currently scheduled for 2/10 but Dr. Samaria Bains message below states 2 week f/u. Would you like him to just keep his already scheduled appt or r/s for later date?

## 2022-02-02 NOTE — TELEPHONE ENCOUNTER
I had ordered an MRI of the brain with IV contrast for this patient to look for any lesions for nocardia given the findings of pulmonary nocardiosis and as nocardia is a neurotropic organism. He had the MRI of the brain done yesterday. I received the results just now and I called the patient about the results. The patient does have a small ring-enhancing lesion in the right temporal area with the surrounding edema. The lesion is small and measures about 7 mm x 7 mm according to the neurologist.  It could be nocardiosis but other considerations include septic embolism and brain metastasis etc.    The patient was able to start IV ceftriaxone. The MRI of the brain also showed left maxillary sinusitis, which if bacterial, will be covered with ceftriaxone. Am ordering a 2D echo for the patient to rule out endocarditis. I am also placing a neurology referral for the patient. The patient is agreeable with referral.  Please help him get him scheduled in Dr. Aimee Quinones office preferably by end of next week. Please also get him scheduled for an outpatient 2D echo by end of next week. If the patient does have nocardia lesion in the brain, IV ceftriaxone dose will need to be increased to see you next dose of 2 g every 12 hours. Please schedule him for a follow-up virtual visit with me in 2 weeks.       Magi Rocha MD, MPH, [facility] Officer, [facility]  2/2/2022, 10:03 AM  [facility] Infectious Disease   [address] [place] [address], [address] [place] [address]  Office: [phone]  Fax: [phone]  Clinic days:  Tuesday & Thursday

## 2022-02-02 NOTE — TELEPHONE ENCOUNTER
I will like him to see Dr. Bita Alexandre, neurology and get the 2D echo done next week and then do the vv with me please. Thanks.

## 2022-02-07 ENCOUNTER — HOSPITAL ENCOUNTER (OUTPATIENT)
Age: 62
Setting detail: SPECIMEN
Discharge: HOME | End: 2022-02-07
Payer: COMMERCIAL

## 2022-02-07 DIAGNOSIS — G93.9 BRAIN LESION: Primary | ICD-10-CM

## 2022-02-07 DIAGNOSIS — A43.9 NOCARDIOSIS: ICD-10-CM

## 2022-02-07 LAB
A/G RATIO: 1.6 (ref 1.1–2.2)
ALBUMIN SERPL-MCNC: 4.5 G/DL (ref 3.4–5)
ALP BLD-CCNC: 74 U/L (ref 40–129)
ALT SERPL-CCNC: 18 U/L (ref 10–40)
ANION GAP SERPL CALCULATED.3IONS-SCNC: 13 MMOL/L (ref 3–16)
AST SERPL-CCNC: 16 U/L (ref 15–37)
BASOPHILS ABSOLUTE: 0.1 K/UL (ref 0–0.2)
BASOPHILS RELATIVE PERCENT: 1.8 %
BILIRUB SERPL-MCNC: 0.4 MG/DL (ref 0–1)
BUN BLDV-MCNC: 15 MG/DL (ref 7–20)
CALCIUM SERPL-MCNC: 9.7 MG/DL (ref 8.3–10.6)
CHLORIDE BLD-SCNC: 99 MMOL/L (ref 99–110)
CO2: 25 MMOL/L (ref 21–32)
CREAT SERPL-MCNC: 0.9 MG/DL (ref 0.8–1.3)
EOSINOPHILS ABSOLUTE: 0.6 K/UL (ref 0–0.6)
EOSINOPHILS RELATIVE PERCENT: 10.5 %
GFR AFRICAN AMERICAN: >60
GFR NON-AFRICAN AMERICAN: >60
GLUCOSE BLD-MCNC: 87 MG/DL (ref 70–99)
HCT VFR BLD CALC: 43.6 % (ref 40.5–52.5)
HEMOGLOBIN: 14.3 G/DL (ref 13.5–17.5)
LYMPHOCYTES ABSOLUTE: 2.2 K/UL (ref 1–5.1)
LYMPHOCYTES RELATIVE PERCENT: 38.2 %
MCH RBC QN AUTO: 29.5 PG (ref 26–34)
MCHC RBC AUTO-ENTMCNC: 32.7 G/DL (ref 31–36)
MCV RBC AUTO: 90.3 FL (ref 80–100)
MONOCYTES ABSOLUTE: 0.7 K/UL (ref 0–1.3)
MONOCYTES RELATIVE PERCENT: 11.5 %
NEUTROPHILS ABSOLUTE: 2.2 K/UL (ref 1.7–7.7)
NEUTROPHILS RELATIVE PERCENT: 38 %
PDW BLD-RTO: 17.3 % (ref 12.4–15.4)
PLATELET # BLD: 186 K/UL (ref 135–450)
PMV BLD AUTO: 8.1 FL (ref 5–10.5)
POTASSIUM SERPL-SCNC: 4.4 MMOL/L (ref 3.5–5.1)
RBC # BLD: 4.83 M/UL (ref 4.2–5.9)
SODIUM BLD-SCNC: 137 MMOL/L (ref 136–145)
TOTAL PROTEIN: 7.4 G/DL (ref 6.4–8.2)
WBC # BLD: 5.8 K/UL (ref 4–11)

## 2022-02-07 PROCEDURE — 80053 COMPREHEN METABOLIC PANEL: CPT

## 2022-02-07 PROCEDURE — 36415 COLL VENOUS BLD VENIPUNCTURE: CPT

## 2022-02-07 PROCEDURE — 85025 COMPLETE CBC W/AUTO DIFF WBC: CPT

## 2022-02-07 NOTE — TELEPHONE ENCOUNTER
If there is a moderate delay in the routine echo, please change it to stat and get it done this week. Thanks.

## 2022-02-07 NOTE — TELEPHONE ENCOUNTER
Pt r/s for Echo for tomorrow 2/8/22 at [facility] 2:00 appt. Pt notified. I have also contacted Dr. Yancy Zimmerman office to schedule the pt w/ neurology and Dr. Yancy Zimmerman stated that due to the dx of Brain Lesion; he would recommend the pt see a neuro surgeon. Please let the office know once a referral is added so pt can call and schedule.

## 2022-02-08 ENCOUNTER — HOSPITAL ENCOUNTER (OUTPATIENT)
Dept: NON INVASIVE DIAGNOSTICS | Age: 62
Discharge: HOME | End: 2022-02-08
Payer: COMMERCIAL

## 2022-02-08 ENCOUNTER — TELEPHONE (OUTPATIENT)
Dept: INFECTIOUS DISEASES | Age: 62
End: 2022-02-08

## 2022-02-08 LAB
LV EF: 60 %
LVEF MODALITY: NORMAL

## 2022-02-08 PROCEDURE — 93306 TTE W/DOPPLER COMPLETE: CPT

## 2022-02-08 NOTE — TELEPHONE ENCOUNTER
I will follow up on 2D echo. I will discuss with Dr. Fariba Campos. Since it is a small lesion, I am not sure if we really need neurosurgery.

## 2022-02-09 ENCOUNTER — TELEPHONE (OUTPATIENT)
Dept: INFECTIOUS DISEASES | Age: 62
End: 2022-02-09

## 2022-02-09 NOTE — TELEPHONE ENCOUNTER
I called the patient today about his 2D echo results. No evidence of endocarditis. Last BMP from 2/7/2022 reviewed. Serum creatinine 0.9. Serum potassium is 4.4. He is asking if he can increase Bactrim dose. He is on Bactrim DS 1 tablet every 8 hours. I advised him to try to go up to 2 tablets every 8 hours and will see how he does. He is scheduled for a video visit with me tomorrow.

## 2022-02-10 ENCOUNTER — VIRTUAL VISIT (OUTPATIENT)
Dept: INFECTIOUS DISEASES | Age: 62
End: 2022-02-10
Payer: COMMERCIAL

## 2022-02-10 DIAGNOSIS — A43.0 PULMONARY NOCARDIOSIS (HCC): Primary | ICD-10-CM

## 2022-02-10 DIAGNOSIS — G93.9 BRAIN LESION: ICD-10-CM

## 2022-02-10 DIAGNOSIS — Z86.16 HISTORY OF 2019 NOVEL CORONAVIRUS DISEASE (COVID-19): ICD-10-CM

## 2022-02-10 DIAGNOSIS — L40.9 PSORIASIS: ICD-10-CM

## 2022-02-10 DIAGNOSIS — N39.43 BENIGN PROSTATIC HYPERPLASIA WITH POST-VOID DRIBBLING: ICD-10-CM

## 2022-02-10 DIAGNOSIS — Z51.81 THERAPEUTIC DRUG MONITORING: ICD-10-CM

## 2022-02-10 DIAGNOSIS — E66.09 CLASS 2 OBESITY DUE TO EXCESS CALORIES WITH BODY MASS INDEX (BMI) OF 35.0 TO 35.9 IN ADULT, UNSPECIFIED WHETHER SERIOUS COMORBIDITY PRESENT: ICD-10-CM

## 2022-02-10 DIAGNOSIS — Z79.2 RECEIVING INTRAVENOUS ANTIBIOTIC TREATMENT AS OUTPATIENT: ICD-10-CM

## 2022-02-10 DIAGNOSIS — Z71.3 WEIGHT LOSS COUNSELING, ENCOUNTER FOR: ICD-10-CM

## 2022-02-10 DIAGNOSIS — N40.1 BENIGN PROSTATIC HYPERPLASIA WITH POST-VOID DRIBBLING: ICD-10-CM

## 2022-02-10 DIAGNOSIS — Z71.89 ENCOUNTER FOR MEDICATION COUNSELING: ICD-10-CM

## 2022-02-10 PROCEDURE — 99215 OFFICE O/P EST HI 40 MIN: CPT | Performed by: INTERNAL MEDICINE

## 2022-02-10 RX ORDER — SULFAMETHOXAZOLE AND TRIMETHOPRIM 800; 160 MG/1; MG/1
2 TABLET ORAL EVERY 8 HOURS
Qty: 180 TABLET | Refills: 0 | Status: SHIPPED | OUTPATIENT
Start: 2022-02-10 | End: 2022-03-21 | Stop reason: SDUPTHER

## 2022-02-10 ASSESSMENT — ENCOUNTER SYMPTOMS
DIARRHEA: 0
ABDOMINAL PAIN: 0
CONSTIPATION: 0
EYE REDNESS: 0
SORE THROAT: 0
NAUSEA: 0
BACK PAIN: 0
EYE DISCHARGE: 0
TROUBLE SWALLOWING: 0
WHEEZING: 0
SHORTNESS OF BREATH: 0
RHINORRHEA: 0
COUGH: 0

## 2022-02-10 NOTE — PROGRESS NOTES
Infectious Diseases Oupatient Follow-up Note            Reason for Visit:              Pulmonary nocardiosis  Primary Care Physician:  WESLY Smith  History Obtained From:   Patient , Medical Records     CHIEF COMPLAINT:    Chief Complaint   Patient presents with    Follow-up     F/u abx       INTERVAL HISTORY: Latrice Mayorga is a 57 y.o. pleasant male patient, who had a virtual visit with me today for follow-up. History was obtained from chart review and the patient. The patient had a virtual visit with me today for above mentioned complaints. The patient had developed pneumonia in early December 2021 and was having low-grade fever and coughing at that time. He had an outpatient course of levofloxacin in December which did not help much and CT scan of the chest done on December 23 by Dr. 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 The patient establish care with me on 1/26/2022 and was started by me on oral minocycline 100 mg every 12 hour, oral Bactrim DS 1 tablet every 8 hours and IV ceftriaxone 2 g every 24 hours. The patient had a follow-up video visit with me today      Past Medical History:   Past medical and surgical history was reviewed by me during this visit in detail. Past Medical History:   Diagnosis Date    BPH (benign prostatic hyperplasia)     Psoriasis        Past Surgical History:    Past Surgical History:   Procedure Laterality Date    COLONOSCOPY      CT NEEDLE BIOPSY LUNG PERCUTANEOUS  1/10/2022    CT NEEDLE BIOPSY LUNG PERCUTANEOUS 1/10/2022 [facility] CT SCAN    ESOPHAGUS SURGERY  2019    hernia    FOOT SURGERY  2019    bilateraly hammer toes    UPPER GASTROINTESTINAL ENDOSCOPY         Current Medications:    All medications were reviewed by me during this visit  Current Outpatient Medications   Medication Sig Dispense Refill    sulfamethoxazole-trimethoprim (BACTRIM DS;SEPTRA DS) 800-160 MG per tablet Take 2 tablets by mouth every 8 hours 180 tablet 0    minocycline (MINOCIN;DYNACIN) 100 MG capsule Take 1 capsule by mouth 2 times daily 60 capsule 2    Probiotic Acidophilus (FLORANEX) TABS Take 1 tablet by mouth 2 times daily 60 tablet 2    finasteride (PROSCAR) 5 MG tablet Take 1 tablet by mouth daily 30 tablet 11    clobetasol (TEMOVATE) 0.05 % cream Apply topically daily Apply topically once daily 60 g 11    tamsulosin (FLOMAX) 0.4 MG capsule Take 1 capsule by mouth daily 30 capsule 11    albuterol sulfate HFA (VENTOLIN HFA) 108 (90 Base) MCG/ACT inhaler Inhale 2 puffs into the lungs 4 times daily as needed for Wheezing 54 g 1     No current facility-administered medications for this visit. Family history: All family history was reviewed by me today    Family History   Problem Relation Age of Onset    High Blood Pressure Mother     High Cholesterol Mother        No family history of immunocompromising or autoimmune conditions. No h/o TB in in family or contacts    REVIEW OF SYSTEMS:      Review of Systems   Constitutional: Negative for chills, diaphoresis and fever. HENT: Negative for ear discharge, ear pain, rhinorrhea, sore throat and trouble swallowing. Eyes: Negative for discharge and redness. Respiratory: Negative for cough, shortness of breath and wheezing. Cardiovascular: Negative for chest pain and leg swelling. Gastrointestinal: Negative for abdominal pain, constipation, diarrhea and nausea. Endocrine: Negative for polyuria. Genitourinary: Negative for dysuria, flank pain, frequency, hematuria and urgency. Musculoskeletal: Negative for back pain and myalgias. Skin: Negative for rash. Neurological: Negative for dizziness, seizures and headaches. Hematological: Does not bruise/bleed easily.    Psychiatric/Behavioral: Negative for hallucinations and suicidal ideas. All other systems reviewed and are negative. PHYSICAL EXAM:      There were no vitals filed for this visit. Physical Exam       Due to recurrent intermittent Internet speed issues, the camera did not work well. DATA:  All available lab data wasreviewed by me during this visit    CBC:  Lab Results   Component Value Date    WBC 5.8 02/07/2022    HGB 14.3 02/07/2022    HCT 43.6 02/07/2022    MCV 90.3 02/07/2022     02/07/2022    LYMPHOPCT 38.2 02/07/2022    RBC 4.83 02/07/2022    MCH 29.5 02/07/2022    MCHC 32.7 02/07/2022    RDW 17.3 (H) 02/07/2022       Last BMP:  Lab Results   Component Value Date     02/07/2022    K 4.4 02/07/2022    K 3.6 01/10/2022    CL 99 02/07/2022    CO2 25 02/07/2022    BUN 15 02/07/2022    CREATININE 0.9 02/07/2022    GLUCOSE 87 02/07/2022    CALCIUM 9.7 02/07/2022        Hepatic Function Panel:  Lab Results   Component Value Date    ALKPHOS 74 02/07/2022    ALT 18 02/07/2022    AST 16 02/07/2022    PROT 7.4 02/07/2022    BILITOT 0.4 02/07/2022    LABALBU 4.5 02/07/2022       Last CK: No results found for: CKTOTAL    Last ESR:  Lab Results   Component Value Date    SEDRATE 98 (H) 01/06/2022       Last CRP:  Lab Results   Component Value Date    .5 (H) 01/06/2022         Imaging: All pertinent images and reports for the current visit were reviewed by me during this visit. I reviewed the chest x-ray/CT scan/MRI images and independently interpreted the findings and results today. Outside records:    Labs, Microbiology,Radiology and pertinent results from Care everywhere, if available, were reviewed as a part of the consultation.     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 Immunizations: All immunizations were reviewed byme in detail. Immunization History   Administered Date(s) Administered    Tdap (Boostrix, Adacel) 09/30/2015, 06/25/2019       SocialHistory:  All social and epidemiologic history was reviewed and updated be me in detail today. Social History     Socioeconomic History    Marital status:      Spouse name: Not on file    Number of children: Not on file    Years of education: Not on file    Highest education level: Not on file   Occupational History    Not on file   Tobacco Use    Smoking status: Never Smoker    Smokeless tobacco: Never Used   Substance and Sexual Activity    Alcohol use: Yes     Comment: occasionally    Drug use: Never    Sexual activity: Not on file   Other Topics Concern    Not on file   Social History Narrative    Not on file     Social Determinants of Health     Financial Resource Strain: Low Risk     Difficulty of Paying Living Expenses: Not hard at all   Food Insecurity: No Food Insecurity    Worried About Running Out of Food in the Last Year: Never true    [address] in the Last Year: Never true   Transportation Needs:     Lack of Transportation (Medical): Not on file    Lack of Transportation (Non-Medical):  Not on file   Physical Activity:     Days of Exercise per Week: Not on file    Minutes of Exercise per Session: Not on file   Stress:     Feeling of Stress : Not on file   Social Connections:     Frequency of Communication with Friends and Family: Not on file    Frequency of Social Gatherings with Friends and Family: Not on file    Attends Orthodox Services: Not on file    Active Member of Clubs or Organizations: Not on file    Attends Club or Organization Meetings: Not on file    Marital Status: Not on file   Intimate Partner Violence:     Fear of Current or Ex-Partner: Not on file    Emotionally Abused: Not on file    Physically Abused: Not on file    Sexually Abused: Not on file   Housing Stability:     Unable to Pay for Housing in the Last Year: Not on file    Number of [place] in the Last Year: Not on file    Unstable Housing in the Last Year: Not on file       COVID VACCINATION AND LAB RESULT RECORDS:     Internal Administration   First Dose      Second Dose           Last COVID Lab SARS-CoV-2 (no units)   Date Value   12/14/2021 Not Detected     SARS-CoV-2, NAAT (no units)   Date Value   01/10/2022 DETECTED (AA)            IMPRESSION:    The patient is a 57 y.o. old male who  has a past medical history of BPH (benign prostatic hyperplasia) and Psoriasis. was seen today for following problems:    1. Pulmonary nocardiosis ([address]    2. Brain lesion    3. Benign prostatic hyperplasia with post-void dribbling    4. Psoriasis    5. History of 2019 novel coronavirus disease (COVID-19)    6. Receiving intravenous antibiotic treatment as outpatient    7. Encounter for medication counseling    8. Therapeutic drug monitoring    9. Class 2 obesity due to excess calories with body mass index (BMI) of 35.0 to 35.9 in adult, unspecified whether serious comorbidity present    10. Weight loss counseling, encounter for        Discussion:      I had ordered an MRI of the brain for the patient during his last visit as Nocardia is a neurotropic organism. MRI of the brain was done with IV contrast On 2/1/2022 and showed  7 mm x 7 mm ring enhancing lesion in lateral right temporal area    I had ordered a 2D echo for the patient subsequently, which was completed on 2/8/2022. It did not show any valvular vegetations. There was some concern for somewhat elevated right atrial pressures with right atrial dilatation.     In the meanwhile, the patient also had an HIV screen and a QuantiFERON test done on January 27, 2022, which was negative. I had ordered fungal serologies immunodiffusion and complement fixation and urine and serum histoplasma antigens which were negative. Immunoglobulin levels done on January 27 were unremarkable. 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 Sensitivities are still pending    I have been following patient weekly labs. His last set of labs was done on 2/7/2022. Serum creatinine was 0.9. Liver functions were okay. White cell count is 5800 with hemoglobin of 14.3 and platelet count of 716, 000. RECOMMENDATIONS:      Diagnostic Workup:    · Will order repeat MRI of the brain with IV contrast to be done in 3 months to follow-up on the suspected nocardia lesion  · Follow-up on formal susceptibility on the cardiac sepsis from  [facility]  · Will order follow-up CT scan of the chest with IV contrast to be done in 3 months  · Continue weekly CBC and CMP monitoring while on antibiotics  · Continue to follow fever curve  at home  · Platelets seem to be trending downwards for past few weeks although they are still in normal range. We will continue to monitor closely. If they go down significantly, will consider hematology referral      Antimicrobials:    · I discussed patient's case with Dr. Rigoberto Barth, neurology yesterday and phone. He very kindly reviewed the MRI of the brain for me and he thinks that the lesion is suspicious for nocardiosis lesion. He advised me that there are 2 options here.  One is to refer the patient to neurosurgery for a stereotactic biopsy for definitive diagnosis of brain lesion or the second is to increase antibiotic dose to CNS dosing and then repeat MRI of the brain in few months to follow-up on the brain lesion  · I discussed this in detail with the patient and he opted for waiting and repeating the MRI in few months  · Given the 2D echo findings of some right atrial dilatation, I offered her cardiology referral. The patient wanted to forego that at this time  · The patient will also need follow-up with neurology as an outpatient. He will be scheduled with Dr. Zane Watkins in his office in coming weeks. · The patient may also need seizure prophylaxis according to neurology. We will wait for their input  · I will increase IV ceftriaxone dose from 2 g every 24 hours the next dose of 2 g every 12 hours  · Will continue oral minocycline 100 mg every 12 hours  · The patient is tolerating oral Bactrim DS okay at a dose of 1 tablet every 8 hours. I had called the patient yesterday and he asked me if he should increase the dose further. I advised him to increase it to 2 tablets every 8 hours and see how he does with that. If patient can tolerate that dose of oral Bactrim, he will likely not need any further increases in the Bactrim dose for the nocardia coverage  · I have given instructions for above changes in the ceftriaxone dose and frequency to my infusion coordinator  · Continue oral probiotic twice daily  · Discussed the importance of antibiotic compliance with the patient in detail  · Discussed with patient the strategies to stay safe from [facility] exposures including safe social distancing, frequent and proper hand hygiene when outside and using 3 layered mouth and nose coverings/facemasks when outside their home. Labs ordered today in EPIC:    Orders Placed This Encounter   Procedures    CT CHEST W CONTRAST     Standing Status:   Future     Standing Expiration Date:   2/10/2023     Order Specific Question:   Reason for exam:     Answer: Follow-up on right upper lobe lung mass from nocardiosis    MRI BRAIN W CONTRAST     Standing Status:   Future     Standing Expiration Date:   2/10/2023     Order Specific Question:   Reason for exam:     Answer:    Follow-up on brain lesion, CNS nocardiosis suspected       Medications ordered today in EPIC:    Orders Placed This Encounter Medications    sulfamethoxazole-trimethoprim (BACTRIM DS;SEPTRA DS) 800-160 MG per tablet     Sig: Take 2 tablets by mouth every 8 hours     Dispense:  180 tablet     Refill:  0       Drug Monitoring:    · Monitor for antibiotic toxicity as follows: CBC, CMP, ESR, CRP once a week to be collected every Monday or Tuesday. · Fax above results to [phone]. I/v access Management:    · Continue to monitor i.v access sites forerythema, induration, discharge or tenderness. · As always, continue efforts to minimize tubes/lines/drains as clinically appropriate to reduce chances of line associated infections. Patient education and counseling:    · The patient was educated in detail about the side-effects of various antibiotics and things to watch for like new rashes, lip swelling, severereaction, worsening diarrhea, break through fever etc.  · Patient was instructed to stop antibiotics and call our office if these problems were to occur, or go to nearest ER ifexperiencing severe symptoms. · Discussed patient's condition and what to expect. All of the patient's questions were addressed in a satisfactory manner and patient verbalizedunderstanding all instructions. Weight loss counseling:    Extensive weight loss counseling was done. It is important to set a realistic weight loss goal. First goal should be to avoid gaining more weight and staying at current weight (or within 5 percent). People at high risk of developing diabetes who are able to lose 5 percent of their body weight and maintain this weight will reduce their risk of developing diabetes by about 50 percent and reduce their blood pressure. Losing more than 15 percent of  body weight and staying at this weight is an extremely good result, even if you never reach your \"dream\" or \"ideal\" weight.     Lifestyle changes including changing eating habits, substituting excess carbohydrates with proteins, stress reduction, using self-help programs like Weight Watchers®, Overeaters Anonymous®, and Take Off Pounds Sensibly (TOPS)© , following DASH diet and increasing exercise or walking briskly daily for half hour to and hour 5-7 days a week was suggested among other measures. Information was given about various weight loss education programs and their websites like www.cdc.gov/healthyweight, www.choosemyplate.gov and www.health.gov/dietaryguidelines/        Doxycyline/minocycline related instructions: Take Doxycyline/minocycline with a full glass of water and stay upright or sitting up after taking it for 30 minutes as it can cause food pipe irritation (pill esophagitis). Use sunscreen when going in bright sun while on Doxycycline/minocycline as it can cause photosensitivity. Take 1 hour before or 2 hour after dairy, calcium, iron, magnesium, aluminum or zinc.        Follow-up:    · Follow-up with me in ID clinic or through video visit in 3 weeks    Lashell Wen is a 57 y.o. male being evaluated by a Virtual Visit encounter to address concerns as mentioned above. A caregiver was present when appropriate. Due to this being a TeleHealth encounter (During WKPJQ-16 public health emergency), evaluation of the following organ systems was limited: Vitals/Constitutional/EENT/Resp/CV/GI//MS/Neuro/Skin/Heme-Lymph-Imm. Pursuant to the emergency declaration under the [facility], [address] [place] [address] authority and the [employer] General Act, this Virtual Visit was conducted with patient's (and/or legal guardian's) consent, to reduce the patient's risk of exposure to COVID-19 and provide necessary medical care. The patient (and/or legal guardian) has also been advised to contact this office for worsening conditions or problems, and seek emergency medical treatment and/or call 911 if deemed necessary.      Services were provided through an audio and/or video synchronous discussion virtually to substitute for in-person clinic visit. Patient and provider were located at their individual homes. --Dayanna Mendosa MD on 2/10/2022 at 11:53 AM    An electronic signature was used to authenticate this note. TIME SPENT TODAY:    - Spent over 42 minutes on visit (including interval history,  review of data including labs,cultures, imaging, development and implementation of treatment plan and coordination of care). - Over 50% of time spent with pt counseling andeducation. Please note that this chart was generated using Dragon dictation software. Although every effort was made to ensure the accuracy of this automated transcription, some errors in transcription may have occurred inadvertently. If you may need any clarification, please do not hesitate to contact me through EPIC or at the phone number provided below with my electronic signature. Any pictures or media included in this note were obtained after taking informed verbal consent from the patient and with their approval to include those in the patient's medical record. Thankyou for involving me inthe care of your patient. If you have any additional questions, please do not hesitate to contact me.       Dayanna Mendosa MD, MPH, [address] [place] [address]  2/10/2022, 11:53 AM  [facility]   [address] [place] [address] [address] [facility], [address] [place] [address]  Office: [phone]  Fax: [phone]  Clinic days:  Tuesday & Thursday

## 2022-02-14 ENCOUNTER — HOSPITAL ENCOUNTER (OUTPATIENT)
Age: 62
Setting detail: SPECIMEN
Discharge: HOME | End: 2022-02-14
Payer: COMMERCIAL

## 2022-02-14 LAB
A/G RATIO: 1.6 (ref 1.1–2.2)
ALBUMIN SERPL-MCNC: 4.6 G/DL (ref 3.4–5)
ALP BLD-CCNC: 71 U/L (ref 40–129)
ALT SERPL-CCNC: 27 U/L (ref 10–40)
ANION GAP SERPL CALCULATED.3IONS-SCNC: 12 MMOL/L (ref 3–16)
AST SERPL-CCNC: 20 U/L (ref 15–37)
BASOPHILS ABSOLUTE: 0.1 K/UL (ref 0–0.2)
BASOPHILS RELATIVE PERCENT: 1.1 %
BILIRUB SERPL-MCNC: 0.4 MG/DL (ref 0–1)
BUN BLDV-MCNC: 13 MG/DL (ref 7–20)
CALCIUM SERPL-MCNC: 10.1 MG/DL (ref 8.3–10.6)
CHLORIDE BLD-SCNC: 100 MMOL/L (ref 99–110)
CO2: 25 MMOL/L (ref 21–32)
CREAT SERPL-MCNC: 0.9 MG/DL (ref 0.8–1.3)
EOSINOPHILS ABSOLUTE: 0.6 K/UL (ref 0–0.6)
EOSINOPHILS RELATIVE PERCENT: 11 %
GFR AFRICAN AMERICAN: >60
GFR NON-AFRICAN AMERICAN: >60
GLUCOSE BLD-MCNC: 98 MG/DL (ref 70–99)
HCT VFR BLD CALC: 45.2 % (ref 40.5–52.5)
HEMOGLOBIN: 14.6 G/DL (ref 13.5–17.5)
LYMPHOCYTES ABSOLUTE: 2.2 K/UL (ref 1–5.1)
LYMPHOCYTES RELATIVE PERCENT: 40.9 %
MCH RBC QN AUTO: 29.5 PG (ref 26–34)
MCHC RBC AUTO-ENTMCNC: 32.4 G/DL (ref 31–36)
MCV RBC AUTO: 91.1 FL (ref 80–100)
MONOCYTES ABSOLUTE: 0.6 K/UL (ref 0–1.3)
MONOCYTES RELATIVE PERCENT: 11.3 %
NEUTROPHILS ABSOLUTE: 1.9 K/UL (ref 1.7–7.7)
NEUTROPHILS RELATIVE PERCENT: 35.7 %
PDW BLD-RTO: 17.5 % (ref 12.4–15.4)
PLATELET # BLD: 231 K/UL (ref 135–450)
PMV BLD AUTO: 8.1 FL (ref 5–10.5)
POTASSIUM SERPL-SCNC: 4.4 MMOL/L (ref 3.5–5.1)
RBC # BLD: 4.96 M/UL (ref 4.2–5.9)
SODIUM BLD-SCNC: 137 MMOL/L (ref 136–145)
TOTAL PROTEIN: 7.4 G/DL (ref 6.4–8.2)
WBC # BLD: 5.3 K/UL (ref 4–11)

## 2022-02-14 PROCEDURE — 85025 COMPLETE CBC W/AUTO DIFF WBC: CPT

## 2022-02-14 PROCEDURE — 36415 COLL VENOUS BLD VENIPUNCTURE: CPT

## 2022-02-14 PROCEDURE — 80053 COMPREHEN METABOLIC PANEL: CPT

## 2022-02-15 NOTE — TELEPHONE ENCOUNTER
patient has 2 days left on the bactrim 0 refills, not sure if he will need refills or not. Please advise.

## 2022-02-21 ENCOUNTER — HOSPITAL ENCOUNTER (OUTPATIENT)
Age: 62
Setting detail: SPECIMEN
Discharge: HOME | End: 2022-02-21
Payer: COMMERCIAL

## 2022-02-21 LAB
A/G RATIO: 1.7 (ref 1.1–2.2)
ALBUMIN SERPL-MCNC: 4.6 G/DL (ref 3.4–5)
ALP BLD-CCNC: 68 U/L (ref 40–129)
ALT SERPL-CCNC: 35 U/L (ref 10–40)
ANION GAP SERPL CALCULATED.3IONS-SCNC: 14 MMOL/L (ref 3–16)
AST SERPL-CCNC: 26 U/L (ref 15–37)
BASOPHILS ABSOLUTE: 0 K/UL (ref 0–0.2)
BASOPHILS RELATIVE PERCENT: 0.4 %
BILIRUB SERPL-MCNC: 0.3 MG/DL (ref 0–1)
BUN BLDV-MCNC: 19 MG/DL (ref 7–20)
CALCIUM SERPL-MCNC: 9.6 MG/DL (ref 8.3–10.6)
CHLORIDE BLD-SCNC: 100 MMOL/L (ref 99–110)
CO2: 23 MMOL/L (ref 21–32)
CREAT SERPL-MCNC: 0.9 MG/DL (ref 0.8–1.3)
EOSINOPHILS ABSOLUTE: 0.5 K/UL (ref 0–0.6)
EOSINOPHILS RELATIVE PERCENT: 11 %
GFR AFRICAN AMERICAN: >60
GFR NON-AFRICAN AMERICAN: >60
GLUCOSE BLD-MCNC: 64 MG/DL (ref 70–99)
HCT VFR BLD CALC: 44.3 % (ref 40.5–52.5)
HEMOGLOBIN: 14.3 G/DL (ref 13.5–17.5)
LYMPHOCYTES ABSOLUTE: 1.9 K/UL (ref 1–5.1)
LYMPHOCYTES RELATIVE PERCENT: 39.9 %
MCH RBC QN AUTO: 29.6 PG (ref 26–34)
MCHC RBC AUTO-ENTMCNC: 32.3 G/DL (ref 31–36)
MCV RBC AUTO: 91.5 FL (ref 80–100)
MONOCYTES ABSOLUTE: 0.7 K/UL (ref 0–1.3)
MONOCYTES RELATIVE PERCENT: 14.7 %
NEUTROPHILS ABSOLUTE: 1.6 K/UL (ref 1.7–7.7)
NEUTROPHILS RELATIVE PERCENT: 34 %
PDW BLD-RTO: 17.7 % (ref 12.4–15.4)
PLATELET # BLD: 214 K/UL (ref 135–450)
PMV BLD AUTO: 7.8 FL (ref 5–10.5)
POTASSIUM SERPL-SCNC: 4.8 MMOL/L (ref 3.5–5.1)
RBC # BLD: 4.85 M/UL (ref 4.2–5.9)
SODIUM BLD-SCNC: 137 MMOL/L (ref 136–145)
TOTAL PROTEIN: 7.3 G/DL (ref 6.4–8.2)
WBC # BLD: 4.8 K/UL (ref 4–11)

## 2022-02-21 PROCEDURE — 80053 COMPREHEN METABOLIC PANEL: CPT

## 2022-02-21 PROCEDURE — 36415 COLL VENOUS BLD VENIPUNCTURE: CPT

## 2022-02-21 PROCEDURE — 85025 COMPLETE CBC W/AUTO DIFF WBC: CPT

## 2022-02-28 ENCOUNTER — HOSPITAL ENCOUNTER (OUTPATIENT)
Age: 62
Setting detail: SPECIMEN
Discharge: HOME | End: 2022-02-28
Payer: COMMERCIAL

## 2022-02-28 LAB
A/G RATIO: 1.7 (ref 1.1–2.2)
ALBUMIN SERPL-MCNC: 4.6 G/DL (ref 3.4–5)
ALP BLD-CCNC: 65 U/L (ref 40–129)
ALT SERPL-CCNC: 30 U/L (ref 10–40)
ANION GAP SERPL CALCULATED.3IONS-SCNC: 13 MMOL/L (ref 3–16)
AST SERPL-CCNC: 22 U/L (ref 15–37)
BASOPHILS ABSOLUTE: 0 K/UL (ref 0–0.2)
BASOPHILS RELATIVE PERCENT: 0.6 %
BILIRUB SERPL-MCNC: 0.3 MG/DL (ref 0–1)
BUN BLDV-MCNC: 18 MG/DL (ref 7–20)
CALCIUM SERPL-MCNC: 9.6 MG/DL (ref 8.3–10.6)
CHLORIDE BLD-SCNC: 99 MMOL/L (ref 99–110)
CO2: 24 MMOL/L (ref 21–32)
CREAT SERPL-MCNC: 0.9 MG/DL (ref 0.8–1.3)
CULTURE, FUNGUS BLOOD: NORMAL
EOSINOPHILS ABSOLUTE: 0.8 K/UL (ref 0–0.6)
EOSINOPHILS RELATIVE PERCENT: 14.2 %
GFR AFRICAN AMERICAN: >60
GFR NON-AFRICAN AMERICAN: >60
GLUCOSE BLD-MCNC: 89 MG/DL (ref 70–99)
HCT VFR BLD CALC: 44.8 % (ref 40.5–52.5)
HEMOGLOBIN: 14.7 G/DL (ref 13.5–17.5)
LYMPHOCYTES ABSOLUTE: 2 K/UL (ref 1–5.1)
LYMPHOCYTES RELATIVE PERCENT: 35.5 %
MCH RBC QN AUTO: 29.8 PG (ref 26–34)
MCHC RBC AUTO-ENTMCNC: 32.8 G/DL (ref 31–36)
MCV RBC AUTO: 90.8 FL (ref 80–100)
MONOCYTES ABSOLUTE: 0.8 K/UL (ref 0–1.3)
MONOCYTES RELATIVE PERCENT: 14.1 %
NEUTROPHILS ABSOLUTE: 2 K/UL (ref 1.7–7.7)
NEUTROPHILS RELATIVE PERCENT: 35.6 %
PDW BLD-RTO: 17.5 % (ref 12.4–15.4)
PLATELET # BLD: 208 K/UL (ref 135–450)
PMV BLD AUTO: 7.8 FL (ref 5–10.5)
POTASSIUM SERPL-SCNC: 4.9 MMOL/L (ref 3.5–5.1)
RBC # BLD: 4.93 M/UL (ref 4.2–5.9)
SODIUM BLD-SCNC: 136 MMOL/L (ref 136–145)
TOTAL PROTEIN: 7.3 G/DL (ref 6.4–8.2)
WBC # BLD: 5.5 K/UL (ref 4–11)

## 2022-02-28 PROCEDURE — 85025 COMPLETE CBC W/AUTO DIFF WBC: CPT

## 2022-02-28 PROCEDURE — 80053 COMPREHEN METABOLIC PANEL: CPT

## 2022-02-28 PROCEDURE — 36415 COLL VENOUS BLD VENIPUNCTURE: CPT

## 2022-03-07 ENCOUNTER — HOSPITAL ENCOUNTER (OUTPATIENT)
Age: 62
Setting detail: SPECIMEN
Discharge: HOME | End: 2022-03-07
Payer: COMMERCIAL

## 2022-03-07 LAB
A/G RATIO: 1.9 (ref 1.1–2.2)
ALBUMIN SERPL-MCNC: 4.4 G/DL (ref 3.4–5)
ALP BLD-CCNC: 59 U/L (ref 40–129)
ALT SERPL-CCNC: 33 U/L (ref 10–40)
ANION GAP SERPL CALCULATED.3IONS-SCNC: 11 MMOL/L (ref 3–16)
AST SERPL-CCNC: 22 U/L (ref 15–37)
BASOPHILS ABSOLUTE: 0 K/UL (ref 0–0.2)
BASOPHILS RELATIVE PERCENT: 0.8 %
BILIRUB SERPL-MCNC: 0.3 MG/DL (ref 0–1)
BUN BLDV-MCNC: 18 MG/DL (ref 7–20)
CALCIUM SERPL-MCNC: 9.4 MG/DL (ref 8.3–10.6)
CHLORIDE BLD-SCNC: 104 MMOL/L (ref 99–110)
CO2: 25 MMOL/L (ref 21–32)
CREAT SERPL-MCNC: 0.8 MG/DL (ref 0.8–1.3)
EOSINOPHILS ABSOLUTE: 0.5 K/UL (ref 0–0.6)
EOSINOPHILS RELATIVE PERCENT: 10.4 %
GFR AFRICAN AMERICAN: >60
GFR NON-AFRICAN AMERICAN: >60
GLUCOSE BLD-MCNC: 94 MG/DL (ref 70–99)
HCT VFR BLD CALC: 44 % (ref 40.5–52.5)
HEMOGLOBIN: 14.4 G/DL (ref 13.5–17.5)
LYMPHOCYTES ABSOLUTE: 1.7 K/UL (ref 1–5.1)
LYMPHOCYTES RELATIVE PERCENT: 33.8 %
MCH RBC QN AUTO: 29.8 PG (ref 26–34)
MCHC RBC AUTO-ENTMCNC: 32.8 G/DL (ref 31–36)
MCV RBC AUTO: 90.9 FL (ref 80–100)
MONOCYTES ABSOLUTE: 0.7 K/UL (ref 0–1.3)
MONOCYTES RELATIVE PERCENT: 14.1 %
NEUTROPHILS ABSOLUTE: 2 K/UL (ref 1.7–7.7)
NEUTROPHILS RELATIVE PERCENT: 40.9 %
PDW BLD-RTO: 17.6 % (ref 12.4–15.4)
PLATELET # BLD: 194 K/UL (ref 135–450)
PMV BLD AUTO: 7.6 FL (ref 5–10.5)
POTASSIUM SERPL-SCNC: 4.4 MMOL/L (ref 3.5–5.1)
RBC # BLD: 4.84 M/UL (ref 4.2–5.9)
SODIUM BLD-SCNC: 140 MMOL/L (ref 136–145)
TOTAL PROTEIN: 6.7 G/DL (ref 6.4–8.2)
WBC # BLD: 5 K/UL (ref 4–11)

## 2022-03-07 PROCEDURE — 36415 COLL VENOUS BLD VENIPUNCTURE: CPT

## 2022-03-07 PROCEDURE — 85025 COMPLETE CBC W/AUTO DIFF WBC: CPT

## 2022-03-07 PROCEDURE — 80053 COMPREHEN METABOLIC PANEL: CPT

## 2022-03-14 ENCOUNTER — HOSPITAL ENCOUNTER (OUTPATIENT)
Age: 62
Setting detail: SPECIMEN
Discharge: HOME | End: 2022-03-14
Payer: COMMERCIAL

## 2022-03-14 LAB
A/G RATIO: 1.8 (ref 1.1–2.2)
ALBUMIN SERPL-MCNC: 4.8 G/DL (ref 3.4–5)
ALP BLD-CCNC: 59 U/L (ref 40–129)
ALT SERPL-CCNC: 32 U/L (ref 10–40)
ANION GAP SERPL CALCULATED.3IONS-SCNC: 13 MMOL/L (ref 3–16)
AST SERPL-CCNC: 22 U/L (ref 15–37)
BASOPHILS ABSOLUTE: 0.1 K/UL (ref 0–0.2)
BASOPHILS RELATIVE PERCENT: 1.3 %
BILIRUB SERPL-MCNC: 0.3 MG/DL (ref 0–1)
BUN BLDV-MCNC: 23 MG/DL (ref 7–20)
CALCIUM SERPL-MCNC: 9.3 MG/DL (ref 8.3–10.6)
CHLORIDE BLD-SCNC: 102 MMOL/L (ref 99–110)
CO2: 24 MMOL/L (ref 21–32)
CREAT SERPL-MCNC: 1 MG/DL (ref 0.8–1.3)
EOSINOPHILS ABSOLUTE: 0.4 K/UL (ref 0–0.6)
EOSINOPHILS RELATIVE PERCENT: 7.5 %
GFR AFRICAN AMERICAN: >60
GFR NON-AFRICAN AMERICAN: >60
GLUCOSE BLD-MCNC: 102 MG/DL (ref 70–99)
HCT VFR BLD CALC: 46.5 % (ref 40.5–52.5)
HEMOGLOBIN: 15.1 G/DL (ref 13.5–17.5)
LYMPHOCYTES ABSOLUTE: 1.6 K/UL (ref 1–5.1)
LYMPHOCYTES RELATIVE PERCENT: 33.3 %
MCH RBC QN AUTO: 29.9 PG (ref 26–34)
MCHC RBC AUTO-ENTMCNC: 32.5 G/DL (ref 31–36)
MCV RBC AUTO: 92.1 FL (ref 80–100)
MONOCYTES ABSOLUTE: 0.8 K/UL (ref 0–1.3)
MONOCYTES RELATIVE PERCENT: 15.7 %
NEUTROPHILS ABSOLUTE: 2 K/UL (ref 1.7–7.7)
NEUTROPHILS RELATIVE PERCENT: 42.2 %
PDW BLD-RTO: 18.3 % (ref 12.4–15.4)
PLATELET # BLD: 205 K/UL (ref 135–450)
PMV BLD AUTO: 7.8 FL (ref 5–10.5)
POTASSIUM SERPL-SCNC: 4.7 MMOL/L (ref 3.5–5.1)
RBC # BLD: 5.04 M/UL (ref 4.2–5.9)
SODIUM BLD-SCNC: 139 MMOL/L (ref 136–145)
TOTAL PROTEIN: 7.5 G/DL (ref 6.4–8.2)
WBC # BLD: 4.8 K/UL (ref 4–11)

## 2022-03-14 PROCEDURE — 85025 COMPLETE CBC W/AUTO DIFF WBC: CPT

## 2022-03-14 PROCEDURE — 36415 COLL VENOUS BLD VENIPUNCTURE: CPT

## 2022-03-14 PROCEDURE — 80053 COMPREHEN METABOLIC PANEL: CPT

## 2022-03-21 ENCOUNTER — HOSPITAL ENCOUNTER (OUTPATIENT)
Age: 62
Setting detail: SPECIMEN
Discharge: HOME | End: 2022-03-21
Payer: COMMERCIAL

## 2022-03-21 ENCOUNTER — TELEPHONE (OUTPATIENT)
Dept: INFECTIOUS DISEASES | Age: 62
End: 2022-03-21

## 2022-03-21 LAB
A/G RATIO: 1.7 (ref 1.1–2.2)
ALBUMIN SERPL-MCNC: 4.7 G/DL (ref 3.4–5)
ALP BLD-CCNC: 58 U/L (ref 40–129)
ALT SERPL-CCNC: 35 U/L (ref 10–40)
ANION GAP SERPL CALCULATED.3IONS-SCNC: 12 MMOL/L (ref 3–16)
AST SERPL-CCNC: 25 U/L (ref 15–37)
BASOPHILS ABSOLUTE: 0.1 K/UL (ref 0–0.2)
BASOPHILS RELATIVE PERCENT: 1.1 %
BILIRUB SERPL-MCNC: 0.3 MG/DL (ref 0–1)
BUN BLDV-MCNC: 20 MG/DL (ref 7–20)
CALCIUM SERPL-MCNC: 9.6 MG/DL (ref 8.3–10.6)
CHLORIDE BLD-SCNC: 101 MMOL/L (ref 99–110)
CO2: 26 MMOL/L (ref 21–32)
CREAT SERPL-MCNC: 1 MG/DL (ref 0.8–1.3)
EOSINOPHILS ABSOLUTE: 0 K/UL (ref 0–0.6)
EOSINOPHILS RELATIVE PERCENT: 0.9 %
GFR AFRICAN AMERICAN: >60
GFR NON-AFRICAN AMERICAN: >60
GLUCOSE BLD-MCNC: 88 MG/DL (ref 70–99)
HCT VFR BLD CALC: 47.1 % (ref 40.5–52.5)
HEMOGLOBIN: 15.3 G/DL (ref 13.5–17.5)
LYMPHOCYTES ABSOLUTE: 1.7 K/UL (ref 1–5.1)
LYMPHOCYTES RELATIVE PERCENT: 37.1 %
MCH RBC QN AUTO: 29.9 PG (ref 26–34)
MCHC RBC AUTO-ENTMCNC: 32.6 G/DL (ref 31–36)
MCV RBC AUTO: 91.6 FL (ref 80–100)
MONOCYTES ABSOLUTE: 0.9 K/UL (ref 0–1.3)
MONOCYTES RELATIVE PERCENT: 19.2 %
NEUTROPHILS ABSOLUTE: 1.9 K/UL (ref 1.7–7.7)
NEUTROPHILS RELATIVE PERCENT: 41.7 %
PDW BLD-RTO: 17.5 % (ref 12.4–15.4)
PLATELET # BLD: 201 K/UL (ref 135–450)
PMV BLD AUTO: 7.6 FL (ref 5–10.5)
POTASSIUM SERPL-SCNC: 4.4 MMOL/L (ref 3.5–5.1)
RBC # BLD: 5.14 M/UL (ref 4.2–5.9)
SODIUM BLD-SCNC: 139 MMOL/L (ref 136–145)
TOTAL PROTEIN: 7.5 G/DL (ref 6.4–8.2)
WBC # BLD: 4.6 K/UL (ref 4–11)

## 2022-03-21 PROCEDURE — 85025 COMPLETE CBC W/AUTO DIFF WBC: CPT

## 2022-03-21 PROCEDURE — 36415 COLL VENOUS BLD VENIPUNCTURE: CPT

## 2022-03-21 PROCEDURE — 80053 COMPREHEN METABOLIC PANEL: CPT

## 2022-03-21 RX ORDER — SULFAMETHOXAZOLE AND TRIMETHOPRIM 800; 160 MG/1; MG/1
2 TABLET ORAL EVERY 8 HOURS
Qty: 180 TABLET | Refills: 0 | Status: SHIPPED | OUTPATIENT
Start: 2022-03-21 | End: 2022-04-11 | Stop reason: SDUPTHER

## 2022-03-21 NOTE — TELEPHONE ENCOUNTER
Patient tolerating current dose of Bactrim okay. Renal function, blood counts have been good. Refill ordered.

## 2022-03-21 NOTE — TELEPHONE ENCOUNTER
Pt called stating he is out of bactrim. He is asking if he needs another refill. Please advise. Thanks.

## 2022-03-21 NOTE — TELEPHONE ENCOUNTER
Patient reporting he is out of medication and needs it today to continue his regimen as prescribed. Medication is Sulfamethoxazole. Please advise.

## 2022-03-22 ENCOUNTER — TELEMEDICINE (OUTPATIENT)
Dept: INFECTIOUS DISEASES | Age: 62
End: 2022-03-22
Payer: COMMERCIAL

## 2022-03-22 ENCOUNTER — TELEPHONE (OUTPATIENT)
Dept: INFECTIOUS DISEASES | Age: 62
End: 2022-03-22

## 2022-03-22 DIAGNOSIS — Z71.3 WEIGHT LOSS COUNSELING, ENCOUNTER FOR: ICD-10-CM

## 2022-03-22 DIAGNOSIS — A43.0 PULMONARY NOCARDIOSIS (HCC): Primary | ICD-10-CM

## 2022-03-22 DIAGNOSIS — G93.9 BRAIN LESION: ICD-10-CM

## 2022-03-22 DIAGNOSIS — Z71.89 ENCOUNTER FOR MEDICATION COUNSELING: ICD-10-CM

## 2022-03-22 DIAGNOSIS — L40.9 PSORIASIS: ICD-10-CM

## 2022-03-22 DIAGNOSIS — A43.9 NOCARDIOSIS: ICD-10-CM

## 2022-03-22 DIAGNOSIS — Z86.16 HISTORY OF 2019 NOVEL CORONAVIRUS DISEASE (COVID-19): ICD-10-CM

## 2022-03-22 DIAGNOSIS — Z79.2 RECEIVING INTRAVENOUS ANTIBIOTIC TREATMENT AS OUTPATIENT: ICD-10-CM

## 2022-03-22 DIAGNOSIS — N40.1 BENIGN PROSTATIC HYPERPLASIA WITH POST-VOID DRIBBLING: ICD-10-CM

## 2022-03-22 DIAGNOSIS — M79.10 MYALGIA: ICD-10-CM

## 2022-03-22 DIAGNOSIS — Z51.81 THERAPEUTIC DRUG MONITORING: ICD-10-CM

## 2022-03-22 DIAGNOSIS — N39.43 BENIGN PROSTATIC HYPERPLASIA WITH POST-VOID DRIBBLING: ICD-10-CM

## 2022-03-22 DIAGNOSIS — E66.09 CLASS 2 OBESITY DUE TO EXCESS CALORIES WITH BODY MASS INDEX (BMI) OF 35.0 TO 35.9 IN ADULT, UNSPECIFIED WHETHER SERIOUS COMORBIDITY PRESENT: ICD-10-CM

## 2022-03-22 PROCEDURE — 99215 OFFICE O/P EST HI 40 MIN: CPT | Performed by: INTERNAL MEDICINE

## 2022-03-22 ASSESSMENT — ENCOUNTER SYMPTOMS
BACK PAIN: 0
ABDOMINAL PAIN: 0
EYE REDNESS: 0
COUGH: 0
SHORTNESS OF BREATH: 0
SORE THROAT: 0
CONSTIPATION: 0
DIARRHEA: 0
NAUSEA: 0
TROUBLE SWALLOWING: 0
RHINORRHEA: 0
WHEEZING: 0
EYE DISCHARGE: 0

## 2022-03-22 NOTE — TELEPHONE ENCOUNTER
Spoke with Alyx Parrish at [facility] and advised of IV abx term date per MD of 5/3.   Verbalized understanding

## 2022-03-22 NOTE — PROGRESS NOTES
Infectious Diseases Oupatient Follow-up Note            Reason for Visit:               Pulmonary nocardiosis  Primary Care Physician:  WESLY Pena  History Obtained From:   Patient , Medical Records     CHIEF COMPLAINT:    Chief Complaint   Patient presents with    Follow-up       INTERVAL HISTORY: Lashell Wen is a 60 y.o. pleasant male patient, who had a virtual visit with me today for follow-up. History was obtained from chart review and the patient. The patient had a virtual visit with me today for above mentioned complaints. 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 I have started the patient on oral minocycline, Bactrim and IV ceftriaxone, which is started on February 1, 2022. 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 The nocardia isolate [facility]. The patient had a follow-up video visit with me today. Past Medical History:   Past medical and surgical history was reviewed by me during this visit in detail. Past Medical History:   Diagnosis Date    BPH (benign prostatic hyperplasia)     Psoriasis        Past Surgical History:    Past Surgical History:   Procedure Laterality Date    COLONOSCOPY      CT NEEDLE BIOPSY LUNG PERCUTANEOUS  1/10/2022    CT NEEDLE BIOPSY LUNG PERCUTANEOUS 1/10/2022 F CT SCAN    ESOPHAGUS SURGERY  2019    hernia    FOOT SURGERY  2019    bilateraly hammer toes    UPPER GASTROINTESTINAL ENDOSCOPY         Current Medications:    All medications were reviewed by me during this visit  Current Outpatient Medications   Medication Sig Dispense Refill    sulfamethoxazole-trimethoprim (BACTRIM DS;SEPTRA DS) 800-160 MG per tablet Take 2 tablets by mouth every 8 hours 180 tablet 0    minocycline (MINOCIN;DYNACIN) 100 MG capsule Take 1 capsule by mouth 2 times daily 60 capsule 2    Probiotic Acidophilus (FLORANEX) TABS Take 1 tablet by mouth 2 times daily 60 tablet 2    finasteride (PROSCAR) 5 MG tablet Take 1 tablet by mouth daily 30 tablet 11    clobetasol (TEMOVATE) 0.05 % cream Apply topically daily Apply topically once daily 60 g 11    tamsulosin (FLOMAX) 0.4 MG capsule Take 1 capsule by mouth daily 30 capsule 11    albuterol sulfate HFA (VENTOLIN HFA) 108 (90 Base) MCG/ACT inhaler Inhale 2 puffs into the lungs 4 times daily as needed for Wheezing 54 g 1     No current facility-administered medications for this visit. Family history: All family history was reviewed by me today    Family History   Problem Relation Age of Onset    High Blood Pressure Mother     High Cholesterol Mother        No family history of immunocompromising or autoimmune conditions. No h/o TB in in family or contacts    REVIEW OF SYSTEMS:      Review of Systems   Constitutional: Negative for chills, diaphoresis and fever. HENT: Negative for ear discharge, ear pain, rhinorrhea, sore throat and trouble swallowing. Eyes: Negative for discharge and redness. Respiratory: Negative for cough, shortness of breath and wheezing. Cardiovascular: Negative for chest pain and leg swelling. Gastrointestinal: Negative for abdominal pain, constipation, diarrhea and nausea. Endocrine: Negative for polyuria. Genitourinary: Negative for dysuria, flank pain, frequency, hematuria and urgency. Musculoskeletal: Negative for back pain and myalgias. Skin: Negative for rash. Neurological: Negative for dizziness, seizures and headaches. Hematological: Does not bruise/bleed easily. Psychiatric/Behavioral: Negative for hallucinations and suicidal ideas. All other systems reviewed and are negative.            PHYSICAL EXAM:      There were no vitals filed for this visit. Physical Exam  Constitutional:       General: He is not in acute distress. Appearance: Normal appearance. He is not ill-appearing or toxic-appearing. Comments: The patient was seen today via virtual video visit   HENT:      Head: Normocephalic. Right Ear: External ear normal.      Left Ear: External ear normal.      Nose: Nose normal.      Mouth/Throat:      Pharynx: No oropharyngeal exudate. Eyes:      General: No scleral icterus. Right eye: No discharge. Left eye: No discharge. Extraocular Movements: Extraocular movements intact. Pulmonary:      Effort: Pulmonary effort is normal.   Musculoskeletal:         General: No swelling. Normal range of motion. Cervical back: Normal range of motion. Skin:     Coloration: Skin is not jaundiced. Findings: No bruising or erythema. Neurological:      Mental Status: He is alert and oriented to person, place, and time. Mental status is at baseline. Gait: Gait normal.   Psychiatric:         Mood and Affect: Mood normal.         Behavior: Behavior normal.         Thought Content:  Thought content normal.         Judgment: Judgment normal.              DATA:  All available lab data wasreviewed by me during this visit    CBC:  Lab Results   Component Value Date    WBC 4.6 03/21/2022    HGB 15.3 03/21/2022    HCT 47.1 03/21/2022    MCV 91.6 03/21/2022     03/21/2022    LYMPHOPCT 37.1 03/21/2022    RBC 5.14 03/21/2022    MCH 29.9 03/21/2022    MCHC 32.6 03/21/2022    RDW 17.5 (H) 03/21/2022       Last BMP:  Lab Results   Component Value Date     03/21/2022    K 4.4 03/21/2022    K 3.6 01/10/2022     03/21/2022    CO2 26 03/21/2022    BUN 20 03/21/2022    CREATININE 1.0 03/21/2022    GLUCOSE 88 03/21/2022    CALCIUM 9.6 03/21/2022        Hepatic Function Panel:  Lab Results   Component Value Date    ALKPHOS 58 03/21/2022    ALT 35 03/21/2022    AST 25 03/21/2022    PROT 7.5 03/21/2022    BILITOT 0.3 03/21/2022    LABALBU 4.7 03/21/2022       Last CK: No results found for: CKTOTAL    Last ESR:  Lab Results   Component Value Date    SEDRATE 98 (H) 01/06/2022       Last CRP:  Lab Results   Component Value Date    .5 (H) 01/06/2022         Imaging: All pertinent images and reports for the current visit were reviewed by me during this visit. I reviewed the chest x-ray/CT scan/MRI images and independently interpreted the findings and results today. Outside records:    Labs, Microbiology,Radiology and pertinent results from Care everywhere, if available, were reviewed as a part of the consultation. 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 Immunizations: All immunizations were reviewed byme in detail. Immunization History   Administered Date(s) Administered    Tdap (Boostrix, Adacel) 09/30/2015, 06/25/2019       SocialHistory:  All social and epidemiologic history was reviewed and updated be me in detail today.     Social History     Socioeconomic History    Marital status:      Spouse name: Not on file    Number of children: Not on file    Years of education: Not on file    Highest education level: Not on file   Occupational History    Not on file   Tobacco Use    Smoking status: Never Smoker    Smokeless tobacco: Never Used   Substance and Sexual Activity    Alcohol use: Yes     Comment: occasionally    Drug use: Never    Sexual activity: Not on file   Other Topics Concern  Not on file   Social History Narrative    Not on file     Social Determinants of Health     Financial Resource Strain: Low Risk     Difficulty of Paying Living Expenses: Not hard at all   Food Insecurity: No Food Insecurity    Worried About Running Out of Food in the Last Year: Never true    [address] in the Last Year: Never true   Transportation Needs:     Lack of Transportation (Medical): Not on file    Lack of Transportation (Non-Medical): Not on file   Physical Activity:     Days of Exercise per Week: Not on file    Minutes of Exercise per Session: Not on file   Stress:     Feeling of Stress : Not on file   Social Connections:     Frequency of Communication with Friends and Family: Not on file    Frequency of Social Gatherings with Friends and Family: Not on file    Attends Nondenominational Services: Not on file    Active Member of [address] [place] [address] or Organizations: Not on file    Attends Club or Organization Meetings: Not on file    Marital Status: Not on file   Intimate Partner Violence:     Fear of Current or Ex-Partner: Not on file    Emotionally Abused: Not on file    Physically Abused: Not on file    Sexually Abused: Not on file   Housing Stability:     Unable to Pay for Housing in the Last Year: Not on file    Number of [place] in the Last Year: Not on file    Unstable Housing in the Last Year: Not on file       COVID VACCINATION AND LAB RESULT RECORDS:     Internal Administration   First Dose      Second Dose           Last COVID Lab SARS-CoV-2 (no units)   Date Value   12/14/2021 Not Detected     SARS-CoV-2, NAAT (no units)   Date Value   01/10/2022 DETECTED (AA)            IMPRESSION:    The patient is a 60 y.o. old male who  has a past medical history of BPH (benign prostatic hyperplasia) and Psoriasis. was seen today for following problems:    1. Pulmonary nocardiosis ([facility] [address]    2. Brain lesion    3. Benign prostatic hyperplasia with post-void dribbling    4.  History of 2019 novel coronavirus disease (COVID-19)    5. Receiving intravenous antibiotic treatment as outpatient    6. Therapeutic drug monitoring    7. Class 2 obesity due to excess calories with body mass index (BMI) of 35.0 to 35.9 in adult, unspecified whether serious comorbidity present    8. Nocardiosis    9. Psoriasis    10. Encounter for medication counseling    11. Weight loss counseling, encounter for    12. Myalgia        Discussion:      The patient is on IV ceftriaxone at the next dose of 2 g every 12 hour. He is also on oral minocycline 100 mg every 12 hour and oral Bactrim DS 2 tablets every 8 hours. I have been following his weekly labs. His last set of labs was done on 3/21/2022. His serum creatinine was 1.0, serum potassium was 4.4. Liver function was okay. White cell count of 4600 with hemoglobin of 15.3 and platelet count of 615,687. The patient is doing extremely well. Other than some muscle aches and leg pain from time to time, he has been tolerating antibiotics very well. No fever or diarrhea. No confusion issues. RECOMMENDATIONS:      Diagnostic Workup:    · Continue weekly CBC and CMP  · Given his muscle aches, I will order a CK level to rule out any rhabdomyolysis  · We will order MRI of the brain to be done with IV contrast toward the end of April to follow-up on the brain lesion  · The patient already has an order in place for CT scan of the chest with IV contrast to follow-up on the lung lesion. He can schedule the CT scan toward the end of April or beginning of May  · Continue to follow fever curve  at home      Antimicrobials:    · Will continue IV ceftriaxone 2 g every 12 hour at this time  · Continue oral minocycline 100 mg every 12 hour  · Continue oral Bactrim DS two tablets every 8 hour.   The patient is tolerating this dose very well so far  · Discussed the importance of antibiotic compliance  · Continue PICC line care  · If the patient continues to tolerate these antibiotics well and his labs remained okay, will wait for above scan with results and will consider antibiotic de-escalation accordingly  · Patient's wife was present at bedside during the video visit. All of their questions and concerns were addressed  · I had ordered a referral for Dr. 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 He was advised to call his office and schedule a visit with him to make sure he does not have any neurological deficits at this time from that lesion. · Recommend follow-up video visit with me in around 6 weeks  · Discussed with patient the strategies to stay safe from COVID 19 exposures including safe social distancing, frequent and proper hand hygiene when outside and using 3 layered mouth and nose coverings/facemasks when outside their home. Labs ordered today in EPIC:    Orders Placed This Encounter   Procedures    MRI BRAIN W WO CONTRAST     Standing Status:   Future     Standing Expiration Date:   3/22/2023     Order Specific Question:   STAT Creatinine as needed:     Answer: Yes    CK     Standing Status:   Future     Standing Expiration Date:   3/22/2023       Drug Monitoring:    · Monitor for antibiotic toxicity as follows: CBC, CMP once a week to be collected every Monday or Tuesday. · Fax above results to [phone]. I/v access Management:    · Continue to monitor i.v access sites forerythema, induration, discharge or tenderness. · As always, continue efforts to minimize tubes/lines/drains as clinically appropriate to reduce chances of line associated infections. Weight loss counseling:    Extensive weight loss counseling was done. It is important to set a realistic weight loss goal. First goal should be to avoid gaining more weight and staying at current weight (or within 5 percent).  People at high risk of developing diabetes who are able to lose 5 percent of their body weight and maintain this weight will reduce their risk of developing diabetes by about 50 percent and reduce their blood pressure. Losing more than 15 percent of  body weight and staying at this weight is an extremely good result, even if you never reach your \"dream\" or \"ideal\" weight. Lifestyle changes including changing eating habits, substituting excess carbohydrates with proteins, stress reduction, using self-help programs like Weight Watchers®, Overeaters Anonymous®, and Take Off Pounds Sensibly (TOPS)© , following DASH diet and increasing exercise or walking briskly daily for half hour to and hour 5-7 days a week was suggested among other measures. Information was given about various weight loss education programs and their websites like www.cdc.gov/healthyweight, www.choosemyplate.gov and www.health.gov/dietaryguidelines/    Doxycyline/minocycline related instructions: Take Doxycyline/minocycline with a full glass of water and stay upright or sitting up after taking it for 30 minutes as it can cause food pipe irritation (pill esophagitis). Use sunscreen when going in bright sun while on Doxycycline/minocycline as it can cause photosensitivity. Take 1 hour before or 2 hour after dairy, calcium, iron, magnesium, aluminum or zinc.        Patient education and counseling:    · The patient was educated in detail about the side-effects of various antibiotics and things to watch for like new rashes, lip swelling, severereaction, worsening diarrhea, break through fever etc.  · Patient was instructed to stop antibiotics and call our office if these problems were to occur, or go to nearest ER ifexperiencing severe symptoms. · Discussed patient's condition and what to expect. All of the patient's questions were addressed in a satisfactory manner and patient verbalizedunderstanding all instructions. Level of complexity of visit: High       · Illness(es)/ Infection present that pose threat to life/bodily function.    · There is potential for severe exacerbation of infection/side effects of treatment. · Therapy requires intensive monitoring for antimicrobial agent toxicity. Follow-up:    · Follow-up with me in ID clinic or through video visit in 6 weeks    Jeffrey Tillman is a 60 y.o. male being evaluated by a Virtual Visit encounter to address concerns as mentioned above. A caregiver was present when appropriate. Due to this being a TeleHealth encounter (During VXJJO-20 public health emergency), evaluation of the following organ systems was limited: Vitals/Constitutional/EENT/Resp/CV/GI//MS/Neuro/Skin/Heme-Lymph-Imm. Pursuant to the emergency declaration under the [facility], [address] [place] [address] authority and the [employer] Act, this Virtual Visit was conducted with patient's (and/or legal guardian's) consent, to reduce the patient's risk of exposure to COVID-19 and provide necessary medical care. The patient (and/or legal guardian) has also been advised to contact this office for worsening conditions or problems, and seek emergency medical treatment and/or call 911 if deemed necessary. Services were provided through an audio and/or video synchronous discussion virtually to substitute for in-person clinic visit. Patient and provider were located at their individual homes. --Araseli Shah MD on 3/22/2022 at 11:36 AM    An electronic signature was used to authenticate this note. TIME SPENT TODAY:    - Spent over 41 minutes on visit (including interval history, physical exam, review of data including labs,cultures, imaging, development and implementation of treatment plan and coordination of care). - Over 50% of time spent with pt counseling andeducation. Please note that this chart was generated using Dragon dictation software. Although every effort was made to ensure the accuracy of this automated transcription, some errors in transcription may have occurred inadvertently.  If you may need any clarification, please do not hesitate to contact me through EPIC or at the phone number provided below with my electronic signature. Any pictures or media included in this note were obtained after taking informed verbal consent from the patient and with their approval to include those in the patient's medical record. Thankyou for involving me inthe care of your patient. If you have any additional questions, please do not hesitate to contact me.       Morena Joseph MD, MPH, [facility]  3/22/2022, 11:36 AM  [facility] Infectious Disease   [address] [place], [place] [address] [place]., [address] [facility], [address] [place] [address]  Office: [phone]  Fax: [phone]  Clinic days:  Tuesday & Thursday

## 2022-03-28 ENCOUNTER — HOSPITAL ENCOUNTER (OUTPATIENT)
Age: 62
Setting detail: SPECIMEN
Discharge: HOME | End: 2022-03-28
Payer: COMMERCIAL

## 2022-03-28 LAB
A/G RATIO: 1.7 (ref 1.1–2.2)
ALBUMIN SERPL-MCNC: 4.3 G/DL (ref 3.4–5)
ALP BLD-CCNC: 50 U/L (ref 40–129)
ALT SERPL-CCNC: 24 U/L (ref 10–40)
ANION GAP SERPL CALCULATED.3IONS-SCNC: 11 MMOL/L (ref 3–16)
AST SERPL-CCNC: 19 U/L (ref 15–37)
BASOPHILS ABSOLUTE: 0 K/UL (ref 0–0.2)
BASOPHILS RELATIVE PERCENT: 0.5 %
BILIRUB SERPL-MCNC: <0.2 MG/DL (ref 0–1)
BUN BLDV-MCNC: 16 MG/DL (ref 7–20)
CALCIUM SERPL-MCNC: 9.8 MG/DL (ref 8.3–10.6)
CHLORIDE BLD-SCNC: 105 MMOL/L (ref 99–110)
CO2: 23 MMOL/L (ref 21–32)
CREAT SERPL-MCNC: 1 MG/DL (ref 0.8–1.3)
EOSINOPHILS ABSOLUTE: 0 K/UL (ref 0–0.6)
EOSINOPHILS RELATIVE PERCENT: 0 %
GFR AFRICAN AMERICAN: >60
GFR NON-AFRICAN AMERICAN: >60
GLUCOSE BLD-MCNC: 96 MG/DL (ref 70–99)
HCT VFR BLD CALC: 43.8 % (ref 40.5–52.5)
HEMOGLOBIN: 14.5 G/DL (ref 13.5–17.5)
LYMPHOCYTES ABSOLUTE: 1.3 K/UL (ref 1–5.1)
LYMPHOCYTES RELATIVE PERCENT: 28 %
MCH RBC QN AUTO: 30.7 PG (ref 26–34)
MCHC RBC AUTO-ENTMCNC: 33.2 G/DL (ref 31–36)
MCV RBC AUTO: 92.5 FL (ref 80–100)
MONOCYTES ABSOLUTE: 0.8 K/UL (ref 0–1.3)
MONOCYTES RELATIVE PERCENT: 17.9 %
NEUTROPHILS ABSOLUTE: 2.4 K/UL (ref 1.7–7.7)
NEUTROPHILS RELATIVE PERCENT: 53.6 %
PDW BLD-RTO: 16.8 % (ref 12.4–15.4)
PLATELET # BLD: 197 K/UL (ref 135–450)
PMV BLD AUTO: 7.8 FL (ref 5–10.5)
POTASSIUM SERPL-SCNC: 5 MMOL/L (ref 3.5–5.1)
RBC # BLD: 4.73 M/UL (ref 4.2–5.9)
SODIUM BLD-SCNC: 139 MMOL/L (ref 136–145)
TOTAL CK: 86 U/L (ref 39–308)
TOTAL PROTEIN: 6.9 G/DL (ref 6.4–8.2)
WBC # BLD: 4.5 K/UL (ref 4–11)

## 2022-03-28 PROCEDURE — 85025 COMPLETE CBC W/AUTO DIFF WBC: CPT

## 2022-03-28 PROCEDURE — 36415 COLL VENOUS BLD VENIPUNCTURE: CPT

## 2022-03-28 PROCEDURE — 80053 COMPREHEN METABOLIC PANEL: CPT

## 2022-03-28 PROCEDURE — 82550 ASSAY OF CK (CPK): CPT

## 2022-04-04 ENCOUNTER — HOSPITAL ENCOUNTER (OUTPATIENT)
Age: 62
Setting detail: SPECIMEN
Discharge: HOME | End: 2022-04-04
Payer: COMMERCIAL

## 2022-04-04 LAB
A/G RATIO: 1.7 (ref 1.1–2.2)
ALBUMIN SERPL-MCNC: 4.5 G/DL (ref 3.4–5)
ALP BLD-CCNC: 53 U/L (ref 40–129)
ALT SERPL-CCNC: 27 U/L (ref 10–40)
ANION GAP SERPL CALCULATED.3IONS-SCNC: 12 MMOL/L (ref 3–16)
AST SERPL-CCNC: 19 U/L (ref 15–37)
BASOPHILS ABSOLUTE: 0 K/UL (ref 0–0.2)
BASOPHILS RELATIVE PERCENT: 0.1 %
BILIRUB SERPL-MCNC: <0.2 MG/DL (ref 0–1)
BUN BLDV-MCNC: 17 MG/DL (ref 7–20)
CALCIUM SERPL-MCNC: 9.4 MG/DL (ref 8.3–10.6)
CHLORIDE BLD-SCNC: 104 MMOL/L (ref 99–110)
CO2: 23 MMOL/L (ref 21–32)
CREAT SERPL-MCNC: 1.1 MG/DL (ref 0.8–1.3)
EOSINOPHILS ABSOLUTE: 0 K/UL (ref 0–0.6)
EOSINOPHILS RELATIVE PERCENT: 0 %
GFR AFRICAN AMERICAN: >60
GFR NON-AFRICAN AMERICAN: >60
GLUCOSE BLD-MCNC: 116 MG/DL (ref 70–99)
HCT VFR BLD CALC: 46.8 % (ref 40.5–52.5)
HEMOGLOBIN: 15.1 G/DL (ref 13.5–17.5)
LYMPHOCYTES ABSOLUTE: 1.6 K/UL (ref 1–5.1)
LYMPHOCYTES RELATIVE PERCENT: 36.4 %
MCH RBC QN AUTO: 29.8 PG (ref 26–34)
MCHC RBC AUTO-ENTMCNC: 32.2 G/DL (ref 31–36)
MCV RBC AUTO: 92.6 FL (ref 80–100)
MONOCYTES ABSOLUTE: 0.6 K/UL (ref 0–1.3)
MONOCYTES RELATIVE PERCENT: 14.5 %
NEUTROPHILS ABSOLUTE: 2.1 K/UL (ref 1.7–7.7)
NEUTROPHILS RELATIVE PERCENT: 49 %
PDW BLD-RTO: 16.3 % (ref 12.4–15.4)
PLATELET # BLD: 225 K/UL (ref 135–450)
PMV BLD AUTO: 7.6 FL (ref 5–10.5)
POTASSIUM SERPL-SCNC: 4.4 MMOL/L (ref 3.5–5.1)
RBC # BLD: 5.06 M/UL (ref 4.2–5.9)
SODIUM BLD-SCNC: 139 MMOL/L (ref 136–145)
TOTAL PROTEIN: 7.2 G/DL (ref 6.4–8.2)
WBC # BLD: 4.3 K/UL (ref 4–11)

## 2022-04-04 PROCEDURE — 85025 COMPLETE CBC W/AUTO DIFF WBC: CPT

## 2022-04-04 PROCEDURE — 80053 COMPREHEN METABOLIC PANEL: CPT

## 2022-04-04 PROCEDURE — 36415 COLL VENOUS BLD VENIPUNCTURE: CPT

## 2022-04-11 ENCOUNTER — HOSPITAL ENCOUNTER (OUTPATIENT)
Age: 62
Setting detail: SPECIMEN
Discharge: HOME | End: 2022-04-11
Payer: COMMERCIAL

## 2022-04-11 LAB
A/G RATIO: 1.7 (ref 1.1–2.2)
ALBUMIN SERPL-MCNC: 4.5 G/DL (ref 3.4–5)
ALP BLD-CCNC: 61 U/L (ref 40–129)
ALT SERPL-CCNC: 29 U/L (ref 10–40)
ANION GAP SERPL CALCULATED.3IONS-SCNC: 12 MMOL/L (ref 3–16)
AST SERPL-CCNC: 20 U/L (ref 15–37)
BASOPHILS ABSOLUTE: 0 K/UL (ref 0–0.2)
BASOPHILS RELATIVE PERCENT: 0 %
BILIRUB SERPL-MCNC: 0.4 MG/DL (ref 0–1)
BUN BLDV-MCNC: 17 MG/DL (ref 7–20)
CALCIUM SERPL-MCNC: 9.8 MG/DL (ref 8.3–10.6)
CHLORIDE BLD-SCNC: 99 MMOL/L (ref 99–110)
CO2: 25 MMOL/L (ref 21–32)
CREAT SERPL-MCNC: 1 MG/DL (ref 0.8–1.3)
EOSINOPHILS ABSOLUTE: 0 K/UL (ref 0–0.6)
EOSINOPHILS RELATIVE PERCENT: 0 %
GFR AFRICAN AMERICAN: >60
GFR NON-AFRICAN AMERICAN: >60
GLUCOSE BLD-MCNC: 99 MG/DL (ref 70–99)
HCT VFR BLD CALC: 46.3 % (ref 40.5–52.5)
HEMOGLOBIN: 15.2 G/DL (ref 13.5–17.5)
LYMPHOCYTES ABSOLUTE: 1.6 K/UL (ref 1–5.1)
LYMPHOCYTES RELATIVE PERCENT: 39.7 %
MCH RBC QN AUTO: 30.3 PG (ref 26–34)
MCHC RBC AUTO-ENTMCNC: 32.8 G/DL (ref 31–36)
MCV RBC AUTO: 92.4 FL (ref 80–100)
MONOCYTES ABSOLUTE: 0.6 K/UL (ref 0–1.3)
MONOCYTES RELATIVE PERCENT: 16.2 %
NEUTROPHILS ABSOLUTE: 1.8 K/UL (ref 1.7–7.7)
NEUTROPHILS RELATIVE PERCENT: 44.1 %
PDW BLD-RTO: 15.6 % (ref 12.4–15.4)
PLATELET # BLD: 185 K/UL (ref 135–450)
PMV BLD AUTO: 8.2 FL (ref 5–10.5)
POTASSIUM SERPL-SCNC: 4.9 MMOL/L (ref 3.5–5.1)
RBC # BLD: 5.01 M/UL (ref 4.2–5.9)
SODIUM BLD-SCNC: 136 MMOL/L (ref 136–145)
TOTAL PROTEIN: 7.1 G/DL (ref 6.4–8.2)
WBC # BLD: 4 K/UL (ref 4–11)

## 2022-04-11 PROCEDURE — 85025 COMPLETE CBC W/AUTO DIFF WBC: CPT

## 2022-04-11 PROCEDURE — 36415 COLL VENOUS BLD VENIPUNCTURE: CPT

## 2022-04-11 PROCEDURE — 80053 COMPREHEN METABOLIC PANEL: CPT

## 2022-04-12 RX ORDER — MINOCYCLINE HYDROCHLORIDE 100 MG/1
100 CAPSULE ORAL 2 TIMES DAILY
Qty: 60 CAPSULE | Refills: 2 | Status: SHIPPED | OUTPATIENT
Start: 2022-04-12 | End: 2022-05-03 | Stop reason: SDUPTHER

## 2022-04-12 RX ORDER — GREEN TEA/HOODIA GORDONII 315-12.5MG
1 CAPSULE ORAL 2 TIMES DAILY
Qty: 60 TABLET | Refills: 2 | Status: SHIPPED | OUTPATIENT
Start: 2022-04-12 | End: 2022-05-03 | Stop reason: SDUPTHER

## 2022-04-12 RX ORDER — SULFAMETHOXAZOLE AND TRIMETHOPRIM 800; 160 MG/1; MG/1
2 TABLET ORAL EVERY 8 HOURS
Qty: 180 TABLET | Refills: 0 | Status: SHIPPED | OUTPATIENT
Start: 2022-04-12 | End: 2022-05-03 | Stop reason: SDUPTHER

## 2022-04-14 ENCOUNTER — OFFICE VISIT (OUTPATIENT)
Dept: PULMONOLOGY | Age: 62
End: 2022-04-14
Payer: COMMERCIAL

## 2022-04-14 VITALS
BODY MASS INDEX: 38.13 KG/M2 | WEIGHT: 297 LBS | HEART RATE: 74 BPM | OXYGEN SATURATION: 96 % | DIASTOLIC BLOOD PRESSURE: 80 MMHG | SYSTOLIC BLOOD PRESSURE: 135 MMHG

## 2022-04-14 DIAGNOSIS — A43.0 NOCARDIAL PNEUMONIA (HCC): Primary | ICD-10-CM

## 2022-04-14 PROCEDURE — 99214 OFFICE O/P EST MOD 30 MIN: CPT | Performed by: INTERNAL MEDICINE

## 2022-04-14 NOTE — PROGRESS NOTES
PULMONARY OFFICE FOLLOW UP NOTE    REASON FOR VISIT:   Chief Complaint   Patient presents with    Shortness of Breath     3 month       DATE OF VISIT: 4/14/2022    HISTORY OF PRESENT ILLNESS: 60y.o. year old male is here for follow-up of pulmonary nocardiosis. He has past medical history of psoriasis. Patient is being treated by infectious disease, Dr. Duayne Lesch for pulmonary and brain nocardiosis. He is on oral minocycline, Bactrim and IV Rocephin since 2/1/2022. He has upcoming scan including CT chest and MRI brain ordered by infectious disease end of April. He denies any shortness of breath or wheezing or chest pain or hemoptysis or cough. Does complain of some side effects from antibiotics including some joint pains. Previously:  Patient was hospitalized in early January 2022 for not resolving pneumonia. He is a lifelong non-smoker. He presented to hospital with complaints of fever, cough productive of white sputum and hemoptysis for almost a month now. Patient symptoms started in early December. He had cough along with low-grade fever with night sweats. He was given outpatient Levaquin and steroid by his primary care physician on 12/14. Symptoms did not resolve. He underwent CT chest as outpatient on 12/23 that showed right upper lobe mass. Patient was referred to oncology by his primary care physician. He is also been complaining of hemoptysis which she described as blood mixed with sputum for the last 1 week. He saw Dr. Thien Nevarez who referred him to pulmonary. Patient had an appointment with pulmonary on 1/24/2022. However, symptoms did not resolve and were getting worse which prompted the patient to come to the hospital. He had a repeat CT chest that was negative for PE but showed the right upper lobe mass is not necrotic with right hilar lymphadenopathy. There is some element of postobstructive pneumonia along with bilateral pulm nodules. I personally reviewed and interpreted all images. In the hospital, patient was given vancomycin and Cefepime for 4 days and then discharged on Augmentin for another 5 days. Patient was also noted to be positive for COVID by rapid test.  For this reason, he could not undergo bronchoscopy. He underwent CT-guided biopsy that showed acute inflammation with necrosis and fibrosis. Stains were negative for fungal and AFB. 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         REVIEW OF SYSTEMS: 14 point ROS is negative beside mentioned in [place]. CONSTITUTIONAL SYMPTOMS: The patient denies fever, fatigue, night sweats, weight loss or weight gain. HEENT: No vision changes. No tinnitus, Denies sinus pain. No hoarseness, or dysphagia. NECK: Patient denies swelling in the neck. CARDIOVASCULAR: Denies chest pain, palpitation, syncope. RESPIRATORY: See above  GASTROINTESTINAL: Denies nausea, abdominal pain or change in bowel function. GENITOURINARY: Denies obstructive symptoms. No history of incontinence. BREASTS: No masses or lumps in the breasts. SKIN: No rashes or itching. MUSCULOSKELETAL: Denies weakness or bone pain. NEUROLOGICAL: No headaches or seizures. PSYCHIATRIC: Denies mood swings or depression. ENDOCRINE: Denies heat or cold intolerance or excessive thirst.  HEMATOLOGIC/LYMPHATIC: Denies easy bruising or lymph node swelling. ALLERGIC/IMMUNOLOGIC: No environmental allergies.     PAST MEDICAL HISTORY:   Past Medical History:   Diagnosis Date    BPH (benign prostatic hyperplasia)     Psoriasis        PAST SURGICAL HISTORY:   Past Surgical History:   Procedure Laterality Date    COLONOSCOPY      CT NEEDLE BIOPSY LUNG PERCUTANEOUS  1/10/2022    CT NEEDLE BIOPSY LUNG PERCUTANEOUS 1/10/2022 [facility] CT SCAN    ESOPHAGUS SURGERY  2019    hernia    FOOT SURGERY  2019    bilateraly hammer toes    UPPER GASTROINTESTINAL ENDOSCOPY         SOCIAL HISTORY:   Social History     Tobacco Use    Smoking status: Never Smoker    Smokeless tobacco: Never Used Substance Use Topics    Alcohol use: Yes     Comment: occasionally    Drug use: Never       FAMILY HISTORY:   Family History   Problem Relation Age of Onset    High Blood Pressure Mother     High Cholesterol Mother        MEDICATIONS:     Current Outpatient Medications on File Prior to Visit   Medication Sig Dispense Refill    sulfamethoxazole-trimethoprim (BACTRIM DS;SEPTRA DS) 800-160 MG per tablet Take 2 tablets by mouth every 8 hours 180 tablet 0    minocycline (MINOCIN;DYNACIN) 100 MG capsule Take 1 capsule by mouth 2 times daily 60 capsule 2    Probiotic Acidophilus (FLORANEX) TABS Take 1 tablet by mouth 2 times daily 60 tablet 2    finasteride (PROSCAR) 5 MG tablet Take 1 tablet by mouth daily 30 tablet 11    clobetasol (TEMOVATE) 0.05 % cream Apply topically daily Apply topically once daily 60 g 11    tamsulosin (FLOMAX) 0.4 MG capsule Take 1 capsule by mouth daily 30 capsule 11     No current facility-administered medications on file prior to visit. ALLERGIES:   Allergies as of 04/14/2022    (No Known Allergies)      OBJECTIVE:   weight is 297 lb (134.7 kg). His blood pressure is 135/80 and his pulse is 74. His oxygen saturation is 96%. PHYSICAL EXAM:    CONSTITUTIONAL: He is a 60y.o.-year-old who appears his stated age. He is alert and oriented x 3 and in no acute distress. HEENT: PERRL. No scleral icterus. No thrush, atraumatic, normocephalic. NECK: Supple, without cervical or supraclavicular lymphadenopathy:  CARDIOVASCULAR: S1 S2 RRR. Without murmer  RESPIRATORY & CHEST: Lungs are clear to auscultation and percussion. No wheezing, no crackles. Good air movement  GASTROINTESTINAL & ABDOMEN: Soft, nontender, positive bowel sounds in all quadrants, non-distended, without hepatosplenomegaly. GENITOURINARY: Deferred. MUSCULOSKELETAL: No tenderness to palpation of the axial skeleton. There is no clubbing. No cyanosis. No edema of the lower extremities.    SKIN OF BODY: No rash or jaundice. PSYCHIATRIC EVALUATION: Normal affect. Patient answers questions appropriately. HEMATOLOGIC/LYMPHATIC/ IMMUNOLOGIC: No palpable lymphadenopathy. NEUROLOGIC: Alert and oriented x 3. Groslly non-focal. Motor strength is 5+/5 in all muscle groups. The patient has a normal sensorium globally. ASSESSMENT AND PLAN:      1.  Large right upper lobe necrotic mass, nocardia infection  Patient CT chest shows large right upper lobe necrotic mass with some element of postobstructive pneumonia. Bilateral other lung nodules are also present along with mild right hilar lymphadenopathy. This mass has been present since 12/23/21 and is now necrotic. He underwent CT needle biopsy that showed acute inflammation with necrosis and fibrosis. Stains were negative for fungus and AFB. Culture growing nocardia abscesses. He was also noted to have a ring-enhancing lesion in the right temporal lobe on MRI brain. Patient is on minocycline, Bactrim and IV Rocephin per infectious disease, Dr. Jean Carr since 2/1/2022. Upcoming scans including CT chest and MRI brain. Return in about 4 months (around 8/14/2022). Monica Ruth MD  Pulmonary Critical Care and Sleep Medicine  Electronically signed by Monica Ruth MD on 4/14/2022 at 9:10 AM     This note was completed using dragon medical speech recognition software. Grammatical errors, random word insertions, pronoun errors and incomplete sentences are occasional consequences of this technology due to software limitations. If there are questions or concerns about the content of this note of information contained within the body of this dictation they should be addressed with the provider for clarification.

## 2022-04-18 ENCOUNTER — HOSPITAL ENCOUNTER (OUTPATIENT)
Dept: CT IMAGING | Age: 62
Discharge: HOME | End: 2022-04-18
Payer: COMMERCIAL

## 2022-04-18 ENCOUNTER — HOSPITAL ENCOUNTER (OUTPATIENT)
Dept: MRI IMAGING | Age: 62
Discharge: HOME | End: 2022-04-18
Payer: COMMERCIAL

## 2022-04-18 DIAGNOSIS — A43.0 PULMONARY NOCARDIOSIS (HCC): ICD-10-CM

## 2022-04-18 DIAGNOSIS — G93.9 BRAIN LESION: ICD-10-CM

## 2022-04-18 PROCEDURE — 71260 CT THORAX DX C+: CPT

## 2022-04-18 PROCEDURE — A9577 INJ MULTIHANCE: HCPCS | Performed by: INTERNAL MEDICINE

## 2022-04-18 PROCEDURE — 6360000004 HC RX CONTRAST MEDICATION: Performed by: INTERNAL MEDICINE

## 2022-04-18 PROCEDURE — 70553 MRI BRAIN STEM W/O & W/DYE: CPT

## 2022-04-18 PROCEDURE — 2580000003 HC RX 258: Performed by: INTERNAL MEDICINE

## 2022-04-18 RX ORDER — SODIUM CHLORIDE 0.9 % (FLUSH) 0.9 %
10 SYRINGE (ML) INJECTION 2 TIMES DAILY
Status: DISCONTINUED | OUTPATIENT
Start: 2022-04-18 | End: 2022-04-19 | Stop reason: HOSPADM

## 2022-04-18 RX ORDER — SODIUM CHLORIDE 0.9 % (FLUSH) 0.9 %
10 SYRINGE (ML) INJECTION PRN
Status: DISCONTINUED | OUTPATIENT
Start: 2022-04-18 | End: 2022-04-19 | Stop reason: HOSPADM

## 2022-04-18 RX ADMIN — Medication 10 ML: at 11:28

## 2022-04-18 RX ADMIN — IOPAMIDOL 75 ML: 755 INJECTION, SOLUTION INTRAVENOUS at 09:31

## 2022-04-18 RX ADMIN — GADOBENATE DIMEGLUMINE 25 ML: 529 INJECTION, SOLUTION INTRAVENOUS at 11:27

## 2022-04-18 RX ADMIN — Medication 10 ML: at 11:27

## 2022-04-19 ENCOUNTER — HOSPITAL ENCOUNTER (OUTPATIENT)
Age: 62
Setting detail: SPECIMEN
Discharge: HOME | End: 2022-04-19
Payer: COMMERCIAL

## 2022-04-19 ENCOUNTER — TELEPHONE (OUTPATIENT)
Dept: INFECTIOUS DISEASES | Age: 62
End: 2022-04-19

## 2022-04-19 DIAGNOSIS — A43.9 NOCARDIOSIS: Primary | ICD-10-CM

## 2022-04-19 LAB
A/G RATIO: 1.6 (ref 1.1–2.2)
ALBUMIN SERPL-MCNC: 4.6 G/DL (ref 3.4–5)
ALP BLD-CCNC: 62 U/L (ref 40–129)
ALT SERPL-CCNC: 33 U/L (ref 10–40)
ANION GAP SERPL CALCULATED.3IONS-SCNC: 13 MMOL/L (ref 3–16)
AST SERPL-CCNC: 21 U/L (ref 15–37)
BASOPHILS ABSOLUTE: 0 K/UL (ref 0–0.2)
BASOPHILS RELATIVE PERCENT: 0.3 %
BILIRUB SERPL-MCNC: <0.2 MG/DL (ref 0–1)
BUN BLDV-MCNC: 20 MG/DL (ref 7–20)
CALCIUM SERPL-MCNC: 9.5 MG/DL (ref 8.3–10.6)
CHLORIDE BLD-SCNC: 97 MMOL/L (ref 99–110)
CO2: 25 MMOL/L (ref 21–32)
CREAT SERPL-MCNC: 1 MG/DL (ref 0.8–1.3)
EOSINOPHILS ABSOLUTE: 0 K/UL (ref 0–0.6)
EOSINOPHILS RELATIVE PERCENT: 0 %
GFR AFRICAN AMERICAN: >60
GFR NON-AFRICAN AMERICAN: >60
GLUCOSE BLD-MCNC: 93 MG/DL (ref 70–99)
HCT VFR BLD CALC: 47.5 % (ref 40.5–52.5)
HEMOGLOBIN: 15.7 G/DL (ref 13.5–17.5)
LYMPHOCYTES ABSOLUTE: 1.8 K/UL (ref 1–5.1)
LYMPHOCYTES RELATIVE PERCENT: 38.7 %
MCH RBC QN AUTO: 30 PG (ref 26–34)
MCHC RBC AUTO-ENTMCNC: 33 G/DL (ref 31–36)
MCV RBC AUTO: 91 FL (ref 80–100)
MONOCYTES ABSOLUTE: 0.8 K/UL (ref 0–1.3)
MONOCYTES RELATIVE PERCENT: 16.5 %
NEUTROPHILS ABSOLUTE: 2.1 K/UL (ref 1.7–7.7)
NEUTROPHILS RELATIVE PERCENT: 44.5 %
PDW BLD-RTO: 15.1 % (ref 12.4–15.4)
PLATELET # BLD: 177 K/UL (ref 135–450)
PMV BLD AUTO: 8.2 FL (ref 5–10.5)
POTASSIUM SERPL-SCNC: 4.9 MMOL/L (ref 3.5–5.1)
RBC # BLD: 5.22 M/UL (ref 4.2–5.9)
SODIUM BLD-SCNC: 135 MMOL/L (ref 136–145)
TOTAL PROTEIN: 7.4 G/DL (ref 6.4–8.2)
WBC # BLD: 4.7 K/UL (ref 4–11)

## 2022-04-19 PROCEDURE — 80053 COMPREHEN METABOLIC PANEL: CPT

## 2022-04-19 PROCEDURE — 36415 COLL VENOUS BLD VENIPUNCTURE: CPT

## 2022-04-19 PROCEDURE — 85025 COMPLETE CBC W/AUTO DIFF WBC: CPT

## 2022-04-19 NOTE — TELEPHONE ENCOUNTER
Spoke with pt. Pt verbalized understanding to stop IV ABX, continue oral ABX, and have blood drawn weekly. Spoke with Maliha Gillespie at [facility] verbalized understanding to pull PICC and DC IV ABX. Thanks.

## 2022-04-19 NOTE — TELEPHONE ENCOUNTER
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 This is excellent. It will be okay from my standpoint to stop IV antibiotic and remove PICC line. Patient should continue oral Bactrim and weekly CBC and BMP monitoring for now.

## 2022-04-22 ENCOUNTER — OFFICE VISIT (OUTPATIENT)
Dept: FAMILY MEDICINE CLINIC | Age: 62
End: 2022-04-22
Payer: COMMERCIAL

## 2022-04-22 ENCOUNTER — HOSPITAL ENCOUNTER (OUTPATIENT)
Dept: GENERAL RADIOLOGY | Age: 62
Discharge: HOME | End: 2022-04-22
Payer: COMMERCIAL

## 2022-04-22 ENCOUNTER — HOSPITAL ENCOUNTER (OUTPATIENT)
Age: 62
Discharge: HOME | End: 2022-04-22
Payer: COMMERCIAL

## 2022-04-22 VITALS
BODY MASS INDEX: 37.23 KG/M2 | SYSTOLIC BLOOD PRESSURE: 118 MMHG | WEIGHT: 290 LBS | TEMPERATURE: 97.5 F | DIASTOLIC BLOOD PRESSURE: 80 MMHG

## 2022-04-22 DIAGNOSIS — M54.42 CHRONIC LEFT-SIDED LOW BACK PAIN WITH LEFT-SIDED SCIATICA: Primary | ICD-10-CM

## 2022-04-22 DIAGNOSIS — G89.29 CHRONIC LEFT-SIDED LOW BACK PAIN WITH LEFT-SIDED SCIATICA: Primary | ICD-10-CM

## 2022-04-22 DIAGNOSIS — M25.552 LEFT HIP PAIN: ICD-10-CM

## 2022-04-22 DIAGNOSIS — M54.42 CHRONIC LEFT-SIDED LOW BACK PAIN WITH LEFT-SIDED SCIATICA: ICD-10-CM

## 2022-04-22 DIAGNOSIS — G89.29 CHRONIC LEFT-SIDED LOW BACK PAIN WITH LEFT-SIDED SCIATICA: ICD-10-CM

## 2022-04-22 PROCEDURE — 72110 X-RAY EXAM L-2 SPINE 4/>VWS: CPT

## 2022-04-22 PROCEDURE — 99213 OFFICE O/P EST LOW 20 MIN: CPT | Performed by: FAMILY MEDICINE

## 2022-04-22 PROCEDURE — 73501 X-RAY EXAM HIP UNI 1 VIEW: CPT

## 2022-04-22 RX ORDER — PREDNISONE 10 MG/1
TABLET ORAL
Qty: 30 TABLET | Refills: 0 | Status: SHIPPED | OUTPATIENT
Start: 2022-04-22 | End: 2022-10-26

## 2022-04-22 RX ORDER — CYCLOBENZAPRINE HCL 5 MG
5 TABLET ORAL NIGHTLY PRN
Qty: 30 TABLET | Refills: 0 | Status: SHIPPED | OUTPATIENT
Start: 2022-04-22 | End: 2022-05-02

## 2022-04-22 ASSESSMENT — ENCOUNTER SYMPTOMS: BACK PAIN: 1

## 2022-04-22 NOTE — PROGRESS NOTES
SUBJECTIVE:    Jaquelin White is a 60 y.o. male who presents for a follow up visit. Chief Complaint   Patient presents with    Lower Back Pain     Patient c/o lower back , left leg pain. Lauren Hinds in Feb and has had pain since. Back Pain  This is a chronic problem. Episode onset: Onset Feb, 2022. The pain is present in the gluteal (?Left hip). The quality of the pain is described as burning. The pain radiates to the left thigh and left knee. Pain scale: after standing pain in left thigh and hip 7/10. The symptoms are aggravated by standing. Associated symptoms include paresthesias and pelvic pain. Risk factors include obesity and sedentary lifestyle. He has tried NSAIDs, heat and ice for the symptoms. The treatment provided no relief. Patient's medications, allergies, past medical,surgical, social and family histories were reviewed and updated as appropriate.      Past Medical History:   Diagnosis Date    BPH (benign prostatic hyperplasia)     Psoriasis      Past Surgical History:   Procedure Laterality Date    COLONOSCOPY      CT NEEDLE BIOPSY LUNG PERCUTANEOUS  1/10/2022    CT NEEDLE BIOPSY LUNG PERCUTANEOUS 1/10/2022 [facility] CT SCAN    ESOPHAGUS SURGERY  2019    hernia    FOOT SURGERY  2019    bilateraly hammer toes    UPPER GASTROINTESTINAL ENDOSCOPY       Family History   Problem Relation Age of Onset    High Blood Pressure Mother     High Cholesterol Mother      Social History     Tobacco Use    Smoking status: Never Smoker    Smokeless tobacco: Never Used   Substance Use Topics    Alcohol use: Yes     Comment: occasionally      No Known Allergies  Current Outpatient Medications on File Prior to Visit   Medication Sig Dispense Refill    sulfamethoxazole-trimethoprim (BACTRIM DS;SEPTRA DS) 800-160 MG per tablet Take 2 tablets by mouth every 8 hours 180 tablet 0    minocycline (MINOCIN;DYNACIN) 100 MG capsule Take 1 capsule by mouth 2 times daily 60 capsule 2    Probiotic Acidophilus [facility]) TABS Take 1 tablet by mouth 2 times daily 60 tablet 2    finasteride (PROSCAR) 5 MG tablet Take 1 tablet by mouth daily 30 tablet 11    clobetasol (TEMOVATE) 0.05 % cream Apply topically daily Apply topically once daily 60 g 11    tamsulosin (FLOMAX) 0.4 MG capsule Take 1 capsule by mouth daily 30 capsule 11     No current facility-administered medications on file prior to visit. Review of Systems   Genitourinary: Positive for pelvic pain. Musculoskeletal: Positive for back pain. Neurological: Positive for paresthesias. OBJECTIVE:    /80   Temp 97.5 °F (36.4 °C)   Wt 290 lb (131.5 kg)   BMI 37.23 kg/m²    Physical Exam  Constitutional:       Appearance: He is well-developed. HENT:      Head: Normocephalic and atraumatic. Right Ear: External ear normal.      Left Ear: External ear normal.      Nose: Nose normal.   Eyes:      General:         Right eye: No discharge. Conjunctiva/sclera: Conjunctivae normal.   Neck:      Thyroid: No thyromegaly. Vascular: No JVD. Trachea: No tracheal deviation. Cardiovascular:      Rate and Rhythm: Normal rate and regular rhythm. Heart sounds: Normal heart sounds. Pulmonary:      Effort: Pulmonary effort is normal. No respiratory distress. Breath sounds: Normal breath sounds. No rales. Musculoskeletal:      Cervical back: Normal range of motion and neck supple. Right lower leg: No edema. Left lower leg: No edema. Lymphadenopathy:      Cervical: No cervical adenopathy. Skin:     General: Skin is warm and dry. Neurological:      Mental Status: He is alert and oriented to person, place, and time. Cranial Nerves: No cranial nerve deficit. Motor: No weakness. Gait: Gait abnormal ( Slightly antalgic).       Deep Tendon Reflexes: Reflexes normal.   Psychiatric:         Mood and Affect: Mood normal.         Behavior: Behavior normal.         ASSESSMENT/PLAN:    Tim Rivera was seen today for lower back pain. Diagnoses and all orders for this visit:    Chronic left-sided low back pain with left-sided sciatica  -     XR LUMBAR SPINE (MIN 4 VIEWS); Future  -     [facility] - [facility]  -     predniSONE (DELTASONE) 10 MG tablet; Take 4 tablets daily for 3 days, then 3 tablets daily for 3 days, then 2 tablets daily for 3 days then 1 tablet daily until finished. -     cyclobenzaprine (FLEXERIL) 5 MG tablet; Take 1 tablet by mouth nightly as needed for Muscle spasms    Left hip pain  -     XR HIP 2-3 VW W PELVIS LEFT; Future    Will obtain plain films due to his history of trauma and duration of his symptoms. We will give him a trial of physical therapy and medications and see how he is doing in 6 weeks. Return in about 6 weeks (around 6/3/2022). Please note portions of this note were completed with a voicerecognition program.  Efforts were made to edit the dictations but occasionally words are mis-transcribed.

## 2022-04-26 ENCOUNTER — HOSPITAL ENCOUNTER (OUTPATIENT)
Age: 62
Discharge: HOME | End: 2022-04-26
Payer: COMMERCIAL

## 2022-04-26 DIAGNOSIS — A43.9 NOCARDIOSIS: ICD-10-CM

## 2022-04-26 LAB
ANION GAP SERPL CALCULATED.3IONS-SCNC: 14 MMOL/L (ref 3–16)
BASOPHILS ABSOLUTE: 0 K/UL (ref 0–0.2)
BASOPHILS RELATIVE PERCENT: 0.1 %
BUN BLDV-MCNC: 25 MG/DL (ref 7–20)
CALCIUM SERPL-MCNC: 10.2 MG/DL (ref 8.3–10.6)
CHLORIDE BLD-SCNC: 98 MMOL/L (ref 99–110)
CO2: 23 MMOL/L (ref 21–32)
CREAT SERPL-MCNC: 1 MG/DL (ref 0.8–1.3)
EOSINOPHILS ABSOLUTE: 0 K/UL (ref 0–0.6)
EOSINOPHILS RELATIVE PERCENT: 0 %
GFR AFRICAN AMERICAN: >60
GFR NON-AFRICAN AMERICAN: >60
GLUCOSE BLD-MCNC: 136 MG/DL (ref 70–99)
HCT VFR BLD CALC: 47.5 % (ref 40.5–52.5)
HEMOGLOBIN: 15.9 G/DL (ref 13.5–17.5)
LYMPHOCYTES ABSOLUTE: 1.7 K/UL (ref 1–5.1)
LYMPHOCYTES RELATIVE PERCENT: 17.2 %
MCH RBC QN AUTO: 30.7 PG (ref 26–34)
MCHC RBC AUTO-ENTMCNC: 33.4 G/DL (ref 31–36)
MCV RBC AUTO: 91.9 FL (ref 80–100)
MONOCYTES ABSOLUTE: 0.6 K/UL (ref 0–1.3)
MONOCYTES RELATIVE PERCENT: 5.7 %
NEUTROPHILS ABSOLUTE: 7.6 K/UL (ref 1.7–7.7)
NEUTROPHILS RELATIVE PERCENT: 77 %
PDW BLD-RTO: 15.4 % (ref 12.4–15.4)
PLATELET # BLD: 211 K/UL (ref 135–450)
PMV BLD AUTO: 7.8 FL (ref 5–10.5)
POTASSIUM SERPL-SCNC: 4.8 MMOL/L (ref 3.5–5.1)
RBC # BLD: 5.17 M/UL (ref 4.2–5.9)
SODIUM BLD-SCNC: 135 MMOL/L (ref 136–145)
WBC # BLD: 9.9 K/UL (ref 4–11)

## 2022-04-26 PROCEDURE — 36415 COLL VENOUS BLD VENIPUNCTURE: CPT

## 2022-04-26 PROCEDURE — 80048 BASIC METABOLIC PNL TOTAL CA: CPT

## 2022-04-26 PROCEDURE — 85025 COMPLETE CBC W/AUTO DIFF WBC: CPT

## 2022-05-03 ENCOUNTER — TELEMEDICINE (OUTPATIENT)
Dept: INFECTIOUS DISEASES | Age: 62
End: 2022-05-03
Payer: COMMERCIAL

## 2022-05-03 DIAGNOSIS — A43.0 PULMONARY NOCARDIOSIS (HCC): Primary | ICD-10-CM

## 2022-05-03 DIAGNOSIS — L40.9 PSORIASIS: ICD-10-CM

## 2022-05-03 DIAGNOSIS — Z71.89 ENCOUNTER FOR MEDICATION COUNSELING: ICD-10-CM

## 2022-05-03 DIAGNOSIS — N39.43 BENIGN PROSTATIC HYPERPLASIA WITH POST-VOID DRIBBLING: ICD-10-CM

## 2022-05-03 DIAGNOSIS — Z51.81 THERAPEUTIC DRUG MONITORING: ICD-10-CM

## 2022-05-03 DIAGNOSIS — E66.09 CLASS 2 OBESITY DUE TO EXCESS CALORIES WITH BODY MASS INDEX (BMI) OF 35.0 TO 35.9 IN ADULT, UNSPECIFIED WHETHER SERIOUS COMORBIDITY PRESENT: ICD-10-CM

## 2022-05-03 DIAGNOSIS — Z86.16 HISTORY OF 2019 NOVEL CORONAVIRUS DISEASE (COVID-19): ICD-10-CM

## 2022-05-03 DIAGNOSIS — N40.1 BENIGN PROSTATIC HYPERPLASIA WITH POST-VOID DRIBBLING: ICD-10-CM

## 2022-05-03 PROCEDURE — 99214 OFFICE O/P EST MOD 30 MIN: CPT | Performed by: INTERNAL MEDICINE

## 2022-05-03 RX ORDER — GREEN TEA/HOODIA GORDONII 315-12.5MG
1 CAPSULE ORAL 2 TIMES DAILY
Qty: 60 TABLET | Refills: 2 | Status: SHIPPED | OUTPATIENT
Start: 2022-05-03 | End: 2022-05-09

## 2022-05-03 RX ORDER — MINOCYCLINE HYDROCHLORIDE 100 MG/1
100 CAPSULE ORAL 2 TIMES DAILY
Qty: 60 CAPSULE | Refills: 2 | Status: SHIPPED | OUTPATIENT
Start: 2022-05-03 | End: 2022-07-14 | Stop reason: ALTCHOICE

## 2022-05-03 RX ORDER — SULFAMETHOXAZOLE AND TRIMETHOPRIM 800; 160 MG/1; MG/1
2 TABLET ORAL EVERY 8 HOURS
Qty: 180 TABLET | Refills: 2 | Status: SHIPPED | OUTPATIENT
Start: 2022-05-03 | End: 2022-05-19 | Stop reason: SDUPTHER

## 2022-05-03 ASSESSMENT — ENCOUNTER SYMPTOMS
SHORTNESS OF BREATH: 0
EYE DISCHARGE: 0
COUGH: 0
EYE REDNESS: 0
WHEEZING: 0
BACK PAIN: 0
RHINORRHEA: 0
CONSTIPATION: 0
TROUBLE SWALLOWING: 0
ABDOMINAL PAIN: 0
SORE THROAT: 0
NAUSEA: 0
DIARRHEA: 0

## 2022-05-03 NOTE — PROGRESS NOTES
Infectious Diseases Oupatient Follow-up Note            Reason for Visit:               Pulmonary nocardiosis  Primary Care Physician:  WESLY Mccollum  History Obtained From:   Patient , Medical Records     CHIEF COMPLAINT:    Chief Complaint   Patient presents with    Follow-up     Pulmonary Nocardiosis       INTERVAL HISTORY: Emily Mcmahan is a 60 y.o. pleasant male patient, who had a virtual visit with me today for follow-up. History was obtained from chart review and the patient. The patient had a virtual visit with me today for above mentioned complaints. 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 The patient was seen by me and was started on oral minocycline, Bactrim DS and IV ceftriaxone at CNS dose of 2 g every 12 hour on February 1, 2022. He had an MRI of the brain done on 2/1/2022 which showed a 7 mm ring-enhancing lesion in the right temporal area concerning for CNS nocardiosis. The patient has been tolerating antibiotic okay. The patient had a follow-up video visit with me today. Past Medical History:   Past medical and surgical history was reviewed by me during this visit in detail. Past Medical History:   Diagnosis Date    BPH (benign prostatic hyperplasia)     Psoriasis        Past Surgical History:    Past Surgical History:   Procedure Laterality Date    COLONOSCOPY      CT NEEDLE BIOPSY LUNG PERCUTANEOUS  1/10/2022    CT NEEDLE BIOPSY LUNG PERCUTANEOUS 1/10/2022 [facility] CT SCAN    ESOPHAGUS SURGERY  2019    hernia    FOOT SURGERY  2019    bilateraly hammer toes    UPPER GASTROINTESTINAL ENDOSCOPY         Current Medications:    All medications were reviewed by me during this visit  Current Outpatient Medications   Medication Sig Dispense Refill    sulfamethoxazole-trimethoprim (BACTRIM DS;SEPTRA DS) 800-160 MG per tablet Take 2 tablets by mouth every 8 hours 180 tablet 2    minocycline (MINOCIN;DYNACIN) 100 MG capsule Take 1 capsule by mouth 2 times daily 60 capsule 2    Probiotic Acidophilus (FLORANEX) TABS Take 1 tablet by mouth 2 times daily 60 tablet 2    predniSONE (DELTASONE) 10 MG tablet Take 4 tablets daily for 3 days, then 3 tablets daily for 3 days, then 2 tablets daily for 3 days then 1 tablet daily until finished. 30 tablet 0    finasteride (PROSCAR) 5 MG tablet Take 1 tablet by mouth daily 30 tablet 11    clobetasol (TEMOVATE) 0.05 % cream Apply topically daily Apply topically once daily 60 g 11    tamsulosin (FLOMAX) 0.4 MG capsule Take 1 capsule by mouth daily 30 capsule 11     No current facility-administered medications for this visit. Family history: All family history was reviewed by me today    Family History   Problem Relation Age of Onset    High Blood Pressure Mother     High Cholesterol Mother        No family history of immunocompromising or autoimmune conditions. No h/o TB in in family or contacts    REVIEW OF SYSTEMS:      Review of Systems   Constitutional: Negative for chills, diaphoresis and fever. HENT: Negative for ear discharge, ear pain, rhinorrhea, sore throat and trouble swallowing. Eyes: Negative for discharge and redness. Respiratory: Negative for cough, shortness of breath and wheezing. Cardiovascular: Negative for chest pain and leg swelling. Gastrointestinal: Negative for abdominal pain, constipation, diarrhea and nausea. Endocrine: Negative for polyuria. Genitourinary: Negative for dysuria, flank pain, frequency, hematuria and urgency. Musculoskeletal: Negative for back pain and myalgias. Skin: Negative for rash. Neurological: Negative for dizziness, seizures and headaches. Hematological: Does not bruise/bleed easily. Psychiatric/Behavioral: Negative for hallucinations and suicidal ideas. All other systems reviewed and are negative. PHYSICAL EXAM:      There were no vitals filed for this visit. Physical Exam  Constitutional:       General: He is not in acute distress. Appearance: Normal appearance. He is not ill-appearing or toxic-appearing. Comments: The patient was seen today via virtual video visit   HENT:      Head: Normocephalic. Right Ear: External ear normal.      Left Ear: External ear normal.      Nose: Nose normal.      Mouth/Throat:      Pharynx: No oropharyngeal exudate. Eyes:      General: No scleral icterus. Right eye: No discharge. Left eye: No discharge. Extraocular Movements: Extraocular movements intact. Pulmonary:      Effort: Pulmonary effort is normal.   Musculoskeletal:         General: No swelling. Normal range of motion. Cervical back: Normal range of motion. Skin:     Coloration: Skin is not jaundiced. Findings: No bruising or erythema. Neurological:      Mental Status: He is alert and oriented to person, place, and time. Mental status is at baseline. Gait: Gait normal.   Psychiatric:         Mood and Affect: Mood normal.         Behavior: Behavior normal.         Thought Content:  Thought content normal.         Judgment: Judgment normal.              DATA:  All available lab data wasreviewed by me during this visit    CBC:  Lab Results   Component Value Date    WBC 9.9 04/26/2022    HGB 15.9 04/26/2022    HCT 47.5 04/26/2022    MCV 91.9 04/26/2022     04/26/2022    LYMPHOPCT 17.2 04/26/2022    RBC 5.17 04/26/2022    MCH 30.7 04/26/2022    MCHC 33.4 04/26/2022    RDW 15.4 04/26/2022       Last BMP:  Lab Results   Component Value Date     04/26/2022    K 4.8 04/26/2022    K 3.6 01/10/2022    CL 98 04/26/2022    CO2 23 04/26/2022    BUN 25 04/26/2022    CREATININE 1.0 04/26/2022    GLUCOSE 136 04/26/2022    CALCIUM 10.2 04/26/2022        Hepatic Function Panel:  Lab Results   Component Value Date    ALKPHOS 62 04/19/2022    ALT 33 04/19/2022 AST 21 04/19/2022    PROT 7.4 04/19/2022    BILITOT <0.2 04/19/2022    LABALBU 4.6 04/19/2022       Last CK:  Lab Results   Component Value Date    CKTOTAL 86 03/28/2022       Last ESR:  Lab Results   Component Value Date    SEDRATE 98 (H) 01/06/2022       Last CRP:  Lab Results   Component Value Date    .5 (H) 01/06/2022         Imaging: All pertinent images and reports for the current visit were reviewed by me during this visit. I reviewed the chest x-ray/CT scan/MRI images and independently interpreted the findings and results today. Outside records:    Labs, Microbiology,Radiology and pertinent results from Care everywhere, if available, were reviewed as a part of the consultation. Problem list:       Patient Active Problem List   Diagnosis Code    Benign prostatic hyperplasia with post-void dribbling N40.1, N39.43    Urinary frequency R35.0    Psoriasis L40.9    Pneumonia J18.9    Nocardiosis A43.9       Microbiology:       All available micro data was reviewed by me during this visit    · Lung biopsy tissue culture  - collected on 1/10/2022: Nocardia abscessus        Allergies and immunizations:       Known drug Allergies: All allergies data was reviewed by me during this visit  Patient has no known allergies. Immunizations: All immunizations were reviewed byme in detail. Immunization History   Administered Date(s) Administered    Tdap (Boostrix, Adacel) 09/30/2015, 06/25/2019       SocialHistory:  All social and epidemiologic history was reviewed and updated be me in detail today.     Social History     Socioeconomic History    Marital status:      Spouse name: Not on file    Number of children: Not on file    Years of education: Not on file    Highest education level: Not on file   Occupational History    Not on file   Tobacco Use    Smoking status: Never Smoker    Smokeless tobacco: Never Used   Substance and Sexual Activity    Alcohol use: Yes     Comment: occasionally    Drug use: Never    Sexual activity: Not on file   Other Topics Concern    Not on file   Social History Narrative    Not on file     Social Determinants of Health     Financial Resource Strain: Low Risk     Difficulty of Paying Living Expenses: Not hard at all   Food Insecurity: No Food Insecurity    Worried About Running Out of Food in the Last Year: Never true    [address] in the Last Year: Never true   Transportation Needs:     Lack of Transportation (Medical): Not on file    Lack of Transportation (Non-Medical): Not on file   Physical Activity:     Days of Exercise per Week: Not on file    Minutes of Exercise per Session: Not on file   Stress:     Feeling of Stress : Not on file   Social Connections:     Frequency of Communication with Friends and Family: Not on file    Frequency of Social Gatherings with Friends and Family: Not on file    Attends [employer] Services: Not on file    Active Member of [address] [place] [address] [employer] or Organizations: Not on file    Attends Club or Organization Meetings: Not on file    Marital Status: Not on file   Intimate Partner Violence:     Fear of Current or Ex-Partner: Not on file    Emotionally Abused: Not on file    Physically Abused: Not on file    Sexually Abused: Not on file   Housing Stability:     Unable to Pay for Housing in the Last Year: Not on file    Number of [place] in the Last Year: Not on file    Unstable Housing in the Last Year: Not on file       COVID VACCINATION AND LAB RESULT RECORDS:     Internal Administration   First Dose      Second Dose           Last COVID Lab SARS-CoV-2 (no units)   Date Value   12/14/2021 Not Detected     SARS-CoV-2, NAAT (no units)   Date Value   01/10/2022 DETECTED (AA)            IMPRESSION:    The patient is a 60 y.o. old male who  has a past medical history of BPH (benign prostatic hyperplasia) and Psoriasis. was seen today for following problems:    1. Pulmonary nocardiosis ([facility] [address]    2.  Benign prostatic hyperplasia with post-void dribbling    3. History of 2019 novel coronavirus disease (COVID-19)    4. Class 2 obesity due to excess calories with body mass index (BMI) of 35.0 to 35.9 in adult, unspecified whether serious comorbidity present    5. Therapeutic drug monitoring    6. Encounter for medication counseling    7. Psoriasis        Discussion:      The patient had a repeat MRI of the brain done on 4/18/2022. I reviewed the MRI images in detail today and independently interpreted the results. It showed resolution of the 7 mm right temporal lobe abscess identified on the previous MRI of the brain done on 2/1/2022. As, I had stopped his IV ceftriaxone on 4/18/2022. The patient remains on oral minocycline 100 mg every 12 hour and oral Bactrim DS 2 tablet every 8 hours. He has been tolerating these antibiotics okay. He had a follow-up video visit with me today. I have been doing his weekly labs and have been following those. Last set of labs was done on 4/26/2022. Serum creatinine was 1.0. Serum potassium was 4.8. White cell count was 9900 with hemoglobin of 15.9 and hematocrit of 47.5. I repeated a CT scan of his chest with IV contrast on 4/18/2022.   I reviewed the CT images in detail today and independently interpreted the results showed near complete resolution of the pleural thickening and right upper lobe lesion as well as improvement in the right hilar lymphadenopathy showing good response of nocardia abscessus infection to treatment      RECOMMENDATIONS:      Diagnostic Workup:    · Will order CBC and CMP to be done every week for therapeutic drug monitoring, as patient is on higher dose of Bactrim as is recommended for nocardia coverage  · Will order CT scan of chest abdomen and pelvis to be done with IV contrast and first week of July to follow-up on lung lesions and to rule out any residual lymphadenopathy from nocardiosis  · Continue to follow fever curve  at home      Antimicrobials:    · Will continue oral Bactrim DS 2 tablet every 8 hours  · Continue oral minocycline 100 mg every 12 hours  · Continue oral probiotic twice daily  · We will plan to continue above antibiotics and probiotics until the next CT scan  · We will also order an immunology referral to make sure patient does not have any unknown immunocompromising states given nocardiosis of pulmonary and brain involvement. I had done a basic immunological work-up including CD4 and CD8 cell count and immunoglobulin levels on 1/27/2022. Those were unremarkable  · Resolution of the brain lesion and improvement in pulmonary lesions and thoracic/mediastinal lymphadenopathy with treatment is reassuring  · Discussed the importance of antibiotic compliance with the patient  · Discussed with patient the strategies to stay safe from COVID 19 exposures including safe social distancing, frequent and proper hand hygiene when outside and using 3 layered mouth and nose coverings/facemasks when outside their home.       Labs ordered today in EPIC:    Orders Placed This Encounter   Procedures    CT CHEST ABDOMEN PELVIS W CONTRAST     Standing Status:   Future     Standing Expiration Date:   5/3/2023     Order Specific Question:   Additional Contrast?     Answer:   None     Order Specific Question:   STAT Creatinine as needed:     Answer:   Yes     Order Specific Question:   Reason for exam:     Answer:   F/u on nocardiosis, r/o any residual lymphadenopathy    CBC with Auto Differential     Standing Status:   Standing     Number of Occurrences:   12     Standing Expiration Date:   5/3/2023    Comprehensive Metabolic Panel     Standing Status:   Standing     Number of Occurrences:   12     Standing Expiration Date:   5/3/2023    External Referral To Immunology     Referral Priority:   Routine     Referral Type:   Eval and Treat     Referral Reason:   Specialty Services Required     Requested Specialty:   Immunology     Number of Visits Requested:   1       Medications ordered today in EPIC:    Orders Placed This Encounter   Medications    sulfamethoxazole-trimethoprim (BACTRIM DS;SEPTRA DS) 800-160 MG per tablet     Sig: Take 2 tablets by mouth every 8 hours     Dispense:  180 tablet     Refill:  2    minocycline (MINOCIN;DYNACIN) 100 MG capsule     Sig: Take 1 capsule by mouth 2 times daily     Dispense:  60 capsule     Refill:  2    Probiotic Acidophilus (FLORANEX) TABS     Sig: Take 1 tablet by mouth 2 times daily     Dispense:  60 tablet     Refill:  2       Drug Monitoring:    · Monitor for antibiotic toxicity as follows: CBC, CMP every week  · Fax above results to [phone]. Doxycyline/minocycline related instructions: Take Doxycyline/minocycline with a full glass of water and stay upright or sitting up after taking it for 30 minutes as it can cause food pipe irritation (pill esophagitis). Use sunscreen when going in bright sun while on Doxycycline/minocycline as it can cause photosensitivity. Take 1 hour before or 2 hour after dairy, calcium, iron, magnesium, aluminum or zinc.      Patient education and counseling:    · The patient was educated in detail about the side-effects of various antibiotics and things to watch for like new rashes, lip swelling, severereaction, worsening diarrhea, break through fever etc.  · Patient was instructed to stop antibiotics and call our office if these problems were to occur, or go to nearest ER ifexperiencing severe symptoms. · Discussed patient's condition and what to expect. All of the patient's questions were addressed in a satisfactory manner and patient verbalizedunderstanding all instructions. Follow-up:    · Follow-up with me in ID clinic or through video visit in 10-12 weeks    Tierra Kwok is a 60 y.o. male being evaluated by a Virtual Visit encounter to address concerns as mentioned above. A caregiver was present when appropriate.  Due to this being a TeleHealth encounter (During QCEEZ-39 public health emergency), evaluation of the following organ systems was limited: Vitals/Constitutional/EENT/Resp/CV/GI//MS/Neuro/Skin/Heme-Lymph-Imm. Pursuant to the emergency declaration under the [facility], [address] [place] [address] and the [employer] Act, this Virtual Visit was conducted with patient's (and/or legal guardian's) consent, to reduce the patient's risk of exposure to COVID-19 and provide necessary medical care. The patient (and/or legal guardian) has also been advised to contact this office for worsening conditions or problems, and seek emergency medical treatment and/or call 911 if deemed necessary. Services were provided through an audio and/or video synchronous discussion virtually to substitute for in-person clinic visit. Patient and provider were located at their individual homes. --Mahsa Pena MD on 5/3/2022 at 10:17 AM    An electronic signature was used to authenticate this note. Level of complexity of visit: High     · Illness(es)/ Infection present that pose threat to bodily function. · There is potential for severe exacerbation of infection/side effects of treatment. · Therapy requires intensive monitoring for antimicrobial agent toxicity. Please note that this chart was generated using Dragon dictation software. Although every effort was made to ensure the accuracy of this automated transcription, some errors in transcription may have occurred inadvertently. If you may need any clarification, please do not hesitate to contact me through EPIC or at the phone number provided below with my electronic signature. Any pictures or media included in this note were obtained after taking informed verbal consent from the patient and with their approval to include those in the patient's medical record. Thankyou for involving me inthe care of your patient. If you have any additional questions, please do not hesitate to contact me.       Mary Watson MD, MPH, [address], [address] [place] [address]  5/3/2022, 10:17 AM  [facility]   [address] [place] [address], [address] [place] [address]  Office: [phone]  Fax: [phone]  Clinic days:  Tuesday & Thursday

## 2022-05-04 ENCOUNTER — HOSPITAL ENCOUNTER (OUTPATIENT)
Age: 62
Discharge: HOME | End: 2022-05-04
Payer: COMMERCIAL

## 2022-05-04 DIAGNOSIS — A43.0 PULMONARY NOCARDIOSIS (HCC): ICD-10-CM

## 2022-05-04 LAB
A/G RATIO: 1.7 (ref 1.1–2.2)
ALBUMIN SERPL-MCNC: 4.5 G/DL (ref 3.4–5)
ALP BLD-CCNC: 56 U/L (ref 40–129)
ALT SERPL-CCNC: 31 U/L (ref 10–40)
ANION GAP SERPL CALCULATED.3IONS-SCNC: 13 MMOL/L (ref 3–16)
AST SERPL-CCNC: 21 U/L (ref 15–37)
BASOPHILS ABSOLUTE: 0 K/UL (ref 0–0.2)
BASOPHILS RELATIVE PERCENT: 0.2 %
BILIRUB SERPL-MCNC: 0.4 MG/DL (ref 0–1)
BUN BLDV-MCNC: 20 MG/DL (ref 7–20)
CALCIUM SERPL-MCNC: 9.7 MG/DL (ref 8.3–10.6)
CHLORIDE BLD-SCNC: 101 MMOL/L (ref 99–110)
CO2: 23 MMOL/L (ref 21–32)
CREAT SERPL-MCNC: 1.2 MG/DL (ref 0.8–1.3)
EOSINOPHILS ABSOLUTE: 0 K/UL (ref 0–0.6)
EOSINOPHILS RELATIVE PERCENT: 0 %
GFR AFRICAN AMERICAN: >60
GFR NON-AFRICAN AMERICAN: >60
GLUCOSE BLD-MCNC: 95 MG/DL (ref 70–99)
HCT VFR BLD CALC: 45.6 % (ref 40.5–52.5)
HEMOGLOBIN: 15.1 G/DL (ref 13.5–17.5)
LYMPHOCYTES ABSOLUTE: 1.8 K/UL (ref 1–5.1)
LYMPHOCYTES RELATIVE PERCENT: 37.4 %
MCH RBC QN AUTO: 30.9 PG (ref 26–34)
MCHC RBC AUTO-ENTMCNC: 33.2 G/DL (ref 31–36)
MCV RBC AUTO: 93.4 FL (ref 80–100)
MONOCYTES ABSOLUTE: 0.7 K/UL (ref 0–1.3)
MONOCYTES RELATIVE PERCENT: 14.8 %
NEUTROPHILS ABSOLUTE: 2.3 K/UL (ref 1.7–7.7)
NEUTROPHILS RELATIVE PERCENT: 47.6 %
PDW BLD-RTO: 15.4 % (ref 12.4–15.4)
PLATELET # BLD: 174 K/UL (ref 135–450)
PMV BLD AUTO: 7.9 FL (ref 5–10.5)
POTASSIUM SERPL-SCNC: 4.8 MMOL/L (ref 3.5–5.1)
RBC # BLD: 4.88 M/UL (ref 4.2–5.9)
SODIUM BLD-SCNC: 137 MMOL/L (ref 136–145)
TOTAL PROTEIN: 7.2 G/DL (ref 6.4–8.2)
WBC # BLD: 4.8 K/UL (ref 4–11)

## 2022-05-04 PROCEDURE — 80053 COMPREHEN METABOLIC PANEL: CPT

## 2022-05-04 PROCEDURE — 85025 COMPLETE CBC W/AUTO DIFF WBC: CPT

## 2022-05-04 PROCEDURE — 36415 COLL VENOUS BLD VENIPUNCTURE: CPT

## 2022-05-09 RX ORDER — LACTOBACILLUS ACIDOPHILUS 0.5 MG
TABLET ORAL
Qty: 60 TABLET | Refills: 2 | Status: SHIPPED | OUTPATIENT
Start: 2022-05-09

## 2022-05-12 ENCOUNTER — HOSPITAL ENCOUNTER (OUTPATIENT)
Age: 62
Discharge: HOME | End: 2022-05-12
Payer: COMMERCIAL

## 2022-05-12 LAB
A/G RATIO: 1.8 (ref 1.1–2.2)
ALBUMIN SERPL-MCNC: 4.4 G/DL (ref 3.4–5)
ALP BLD-CCNC: 59 U/L (ref 40–129)
ALT SERPL-CCNC: 32 U/L (ref 10–40)
ANION GAP SERPL CALCULATED.3IONS-SCNC: 13 MMOL/L (ref 3–16)
AST SERPL-CCNC: 23 U/L (ref 15–37)
BASOPHILS ABSOLUTE: 0 K/UL (ref 0–0.2)
BASOPHILS RELATIVE PERCENT: 1.1 %
BILIRUB SERPL-MCNC: 0.8 MG/DL (ref 0–1)
BUN BLDV-MCNC: 18 MG/DL (ref 7–20)
C-REACTIVE PROTEIN: <3 MG/L (ref 0–5.1)
C3 COMPLEMENT: 102.1 MG/DL (ref 90–180)
C4 COMPLEMENT: 9.5 MG/DL (ref 10–40)
CALCIUM SERPL-MCNC: 9.1 MG/DL (ref 8.3–10.6)
CHLORIDE BLD-SCNC: 103 MMOL/L (ref 99–110)
CO2: 23 MMOL/L (ref 21–32)
CREAT SERPL-MCNC: 1 MG/DL (ref 0.8–1.3)
EOSINOPHILS ABSOLUTE: 0 K/UL (ref 0–0.6)
EOSINOPHILS RELATIVE PERCENT: 0 %
GFR AFRICAN AMERICAN: >60
GFR NON-AFRICAN AMERICAN: >60
GLUCOSE BLD-MCNC: 105 MG/DL (ref 70–99)
HCT VFR BLD CALC: 45.3 % (ref 40.5–52.5)
HEMOGLOBIN: 15 G/DL (ref 13.5–17.5)
IGA: 237 MG/DL (ref 70–400)
IGG: 1052 MG/DL (ref 700–1600)
IGM: 50 MG/DL (ref 40–230)
LYMPHOCYTES ABSOLUTE: 1.2 K/UL (ref 1–5.1)
LYMPHOCYTES RELATIVE PERCENT: 32.6 %
MCH RBC QN AUTO: 31.2 PG (ref 26–34)
MCHC RBC AUTO-ENTMCNC: 33.2 G/DL (ref 31–36)
MCV RBC AUTO: 94 FL (ref 80–100)
MONOCYTES ABSOLUTE: 0.7 K/UL (ref 0–1.3)
MONOCYTES RELATIVE PERCENT: 17.5 %
NEUTROPHILS ABSOLUTE: 1.9 K/UL (ref 1.7–7.7)
NEUTROPHILS RELATIVE PERCENT: 48.8 %
PDW BLD-RTO: 15.3 % (ref 12.4–15.4)
PLATELET # BLD: 180 K/UL (ref 135–450)
PMV BLD AUTO: 7.4 FL (ref 5–10.5)
POTASSIUM SERPL-SCNC: 4.5 MMOL/L (ref 3.5–5.1)
RBC # BLD: 4.82 M/UL (ref 4.2–5.9)
SEDIMENTATION RATE, ERYTHROCYTE: 6 MM/HR (ref 0–20)
SODIUM BLD-SCNC: 139 MMOL/L (ref 136–145)
TOTAL CK: 189 U/L (ref 39–308)
TOTAL PROTEIN: 6.8 G/DL (ref 6.4–8.2)
WBC # BLD: 3.8 K/UL (ref 4–11)

## 2022-05-12 PROCEDURE — 88185 FLOWCYTOMETRY/TC ADD-ON: CPT

## 2022-05-12 PROCEDURE — 36415 COLL VENOUS BLD VENIPUNCTURE: CPT

## 2022-05-12 PROCEDURE — 82550 ASSAY OF CK (CPK): CPT

## 2022-05-12 PROCEDURE — 82784 ASSAY IGA/IGD/IGG/IGM EACH: CPT

## 2022-05-12 PROCEDURE — 86140 C-REACTIVE PROTEIN: CPT

## 2022-05-12 PROCEDURE — 80053 COMPREHEN METABOLIC PANEL: CPT

## 2022-05-12 PROCEDURE — 86160 COMPLEMENT ANTIGEN: CPT

## 2022-05-12 PROCEDURE — 85025 COMPLETE CBC W/AUTO DIFF WBC: CPT

## 2022-05-12 PROCEDURE — 86161 COMPLEMENT/FUNCTION ACTIVITY: CPT

## 2022-05-12 PROCEDURE — 85652 RBC SED RATE AUTOMATED: CPT

## 2022-05-12 PROCEDURE — 86317 IMMUNOASSAY INFECTIOUS AGENT: CPT

## 2022-05-12 PROCEDURE — 86353 LYMPHOCYTE TRANSFORMATION: CPT

## 2022-05-12 PROCEDURE — 88184 FLOWCYTOMETRY/ TC 1 MARKER: CPT

## 2022-05-14 LAB
DIPHTHERIA IGG AB: 0 IU/ML
TETANUS IGG AB: 1.3 IU/ML

## 2022-05-15 LAB — C1 ESTERASE INHIBITOR: 26 MG/DL (ref 21–38)

## 2022-05-16 LAB — C1 ESTERASE INHIBITOR FUNCTIONAL ASSAY: 100 %

## 2022-05-19 LAB — MISCELLANEOUS LAB TEST ORDER: NORMAL

## 2022-05-20 LAB — MISCELLANEOUS LAB TEST RESULT: NORMAL

## 2022-05-20 RX ORDER — SULFAMETHOXAZOLE AND TRIMETHOPRIM 800; 160 MG/1; MG/1
2 TABLET ORAL 2 TIMES DAILY
Qty: 120 TABLET | Refills: 0 | Status: SHIPPED | OUTPATIENT
Start: 2022-05-20 | End: 2022-06-15 | Stop reason: SDUPTHER

## 2022-05-27 ENCOUNTER — HOSPITAL ENCOUNTER (OUTPATIENT)
Age: 62
Discharge: HOME | End: 2022-05-27
Payer: COMMERCIAL

## 2022-05-27 LAB
A/G RATIO: 1.6 (ref 1.1–2.2)
ALBUMIN SERPL-MCNC: 4.4 G/DL (ref 3.4–5)
ALP BLD-CCNC: 58 U/L (ref 40–129)
ALT SERPL-CCNC: 30 U/L (ref 10–40)
ANION GAP SERPL CALCULATED.3IONS-SCNC: 13 MMOL/L (ref 3–16)
AST SERPL-CCNC: 22 U/L (ref 15–37)
BASOPHILS ABSOLUTE: 0 K/UL (ref 0–0.2)
BASOPHILS RELATIVE PERCENT: 0.1 %
BILIRUB SERPL-MCNC: 0.6 MG/DL (ref 0–1)
BUN BLDV-MCNC: 14 MG/DL (ref 7–20)
CALCIUM SERPL-MCNC: 9 MG/DL (ref 8.3–10.6)
CHLORIDE BLD-SCNC: 102 MMOL/L (ref 99–110)
CO2: 23 MMOL/L (ref 21–32)
CREAT SERPL-MCNC: 1 MG/DL (ref 0.8–1.3)
EOSINOPHILS ABSOLUTE: 0 K/UL (ref 0–0.6)
EOSINOPHILS RELATIVE PERCENT: 0 %
GFR AFRICAN AMERICAN: >60
GFR NON-AFRICAN AMERICAN: >60
GLUCOSE BLD-MCNC: 100 MG/DL (ref 70–99)
HCT VFR BLD CALC: 43.8 % (ref 40.5–52.5)
HEMOGLOBIN: 14.6 G/DL (ref 13.5–17.5)
LYMPHOCYTES ABSOLUTE: 1.5 K/UL (ref 1–5.1)
LYMPHOCYTES RELATIVE PERCENT: 40.1 %
MCH RBC QN AUTO: 32 PG (ref 26–34)
MCHC RBC AUTO-ENTMCNC: 33.2 G/DL (ref 31–36)
MCV RBC AUTO: 96.4 FL (ref 80–100)
MONOCYTES ABSOLUTE: 0.8 K/UL (ref 0–1.3)
MONOCYTES RELATIVE PERCENT: 19.5 %
NEUTROPHILS ABSOLUTE: 1.6 K/UL (ref 1.7–7.7)
NEUTROPHILS RELATIVE PERCENT: 40.3 %
PDW BLD-RTO: 15.5 % (ref 12.4–15.4)
PLATELET # BLD: 191 K/UL (ref 135–450)
PMV BLD AUTO: 7.6 FL (ref 5–10.5)
POTASSIUM SERPL-SCNC: 4.7 MMOL/L (ref 3.5–5.1)
RBC # BLD: 4.55 M/UL (ref 4.2–5.9)
SODIUM BLD-SCNC: 138 MMOL/L (ref 136–145)
TOTAL CK: 184 U/L (ref 39–308)
TOTAL PROTEIN: 7.2 G/DL (ref 6.4–8.2)
WBC # BLD: 3.9 K/UL (ref 4–11)

## 2022-05-27 PROCEDURE — 82550 ASSAY OF CK (CPK): CPT

## 2022-05-27 PROCEDURE — 85025 COMPLETE CBC W/AUTO DIFF WBC: CPT

## 2022-05-27 PROCEDURE — 36415 COLL VENOUS BLD VENIPUNCTURE: CPT

## 2022-05-27 PROCEDURE — 80053 COMPREHEN METABOLIC PANEL: CPT

## 2022-06-08 ENCOUNTER — HOSPITAL ENCOUNTER (OUTPATIENT)
Age: 62
Discharge: HOME | End: 2022-06-08
Payer: COMMERCIAL

## 2022-06-08 LAB
A/G RATIO: 1.6 (ref 1.1–2.2)
ALBUMIN SERPL-MCNC: 4.4 G/DL (ref 3.4–5)
ALP BLD-CCNC: 61 U/L (ref 40–129)
ALT SERPL-CCNC: 30 U/L (ref 10–40)
ANION GAP SERPL CALCULATED.3IONS-SCNC: 10 MMOL/L (ref 3–16)
AST SERPL-CCNC: 19 U/L (ref 15–37)
BILIRUB SERPL-MCNC: 0.5 MG/DL (ref 0–1)
BUN BLDV-MCNC: 19 MG/DL (ref 7–20)
CALCIUM SERPL-MCNC: 9.2 MG/DL (ref 8.3–10.6)
CHLORIDE BLD-SCNC: 99 MMOL/L (ref 99–110)
CO2: 28 MMOL/L (ref 21–32)
CREAT SERPL-MCNC: 1.2 MG/DL (ref 0.8–1.3)
GFR AFRICAN AMERICAN: >60
GFR NON-AFRICAN AMERICAN: >60
GLUCOSE BLD-MCNC: 97 MG/DL (ref 70–99)
HCT VFR BLD CALC: 44.7 % (ref 40.5–52.5)
HEMOGLOBIN: 15.3 G/DL (ref 13.5–17.5)
MCH RBC QN AUTO: 32.9 PG (ref 26–34)
MCHC RBC AUTO-ENTMCNC: 34.3 G/DL (ref 31–36)
MCV RBC AUTO: 96 FL (ref 80–100)
PDW BLD-RTO: 15.3 % (ref 12.4–15.4)
PLATELET # BLD: 187 K/UL (ref 135–450)
PMV BLD AUTO: 7.4 FL (ref 5–10.5)
POTASSIUM SERPL-SCNC: 4.5 MMOL/L (ref 3.5–5.1)
RBC # BLD: 4.65 M/UL (ref 4.2–5.9)
SODIUM BLD-SCNC: 137 MMOL/L (ref 136–145)
TOTAL CK: 91 U/L (ref 39–308)
TOTAL PROTEIN: 7.2 G/DL (ref 6.4–8.2)
WBC # BLD: 3.9 K/UL (ref 4–11)

## 2022-06-08 PROCEDURE — 80053 COMPREHEN METABOLIC PANEL: CPT

## 2022-06-08 PROCEDURE — 82550 ASSAY OF CK (CPK): CPT

## 2022-06-08 PROCEDURE — 85027 COMPLETE CBC AUTOMATED: CPT

## 2022-06-08 PROCEDURE — 36415 COLL VENOUS BLD VENIPUNCTURE: CPT

## 2022-06-15 ENCOUNTER — TELEPHONE (OUTPATIENT)
Dept: INFECTIOUS DISEASES | Age: 62
End: 2022-06-15

## 2022-06-15 DIAGNOSIS — A43.9 NOCARDIOSIS: Primary | ICD-10-CM

## 2022-06-15 RX ORDER — SULFAMETHOXAZOLE AND TRIMETHOPRIM 800; 160 MG/1; MG/1
2 TABLET ORAL 2 TIMES DAILY
Qty: 120 TABLET | Refills: 0 | Status: SHIPPED | OUTPATIENT
Start: 2022-06-15 | End: 2022-07-14 | Stop reason: SDUPTHER

## 2022-06-15 NOTE — TELEPHONE ENCOUNTER
Spoke with patient and he has 5 days of Bactrim remaining. Lab work was done and has resulted, CT scan 6/29 and VV 7/14. He is wanting to know if you want to refill his Bactrim.     Thank you

## 2022-06-15 NOTE — TELEPHONE ENCOUNTER
Yes.  1 month refill ordered. Recommend doing CBC and CMP every 2 weeks at least until next appointment.

## 2022-06-28 ENCOUNTER — OFFICE VISIT (OUTPATIENT)
Dept: FAMILY MEDICINE CLINIC | Age: 62
End: 2022-06-28
Payer: COMMERCIAL

## 2022-06-28 DIAGNOSIS — Z23 VACCINE FOR DIPHTHERIA-TETANUS-PERTUSSIS, COMBINED: Primary | ICD-10-CM

## 2022-06-28 PROCEDURE — 90715 TDAP VACCINE 7 YRS/> IM: CPT | Performed by: NURSE PRACTITIONER

## 2022-06-28 PROCEDURE — 90471 IMMUNIZATION ADMIN: CPT | Performed by: NURSE PRACTITIONER

## 2022-06-28 PROCEDURE — 99999 PR OFFICE/OUTPT VISIT,PROCEDURE ONLY: CPT | Performed by: NURSE PRACTITIONER

## 2022-06-28 ASSESSMENT — PATIENT HEALTH QUESTIONNAIRE - PHQ9
1. LITTLE INTEREST OR PLEASURE IN DOING THINGS: 0
SUM OF ALL RESPONSES TO PHQ QUESTIONS 1-9: 0
SUM OF ALL RESPONSES TO PHQ9 QUESTIONS 1 & 2: 0
SUM OF ALL RESPONSES TO PHQ QUESTIONS 1-9: 0
SUM OF ALL RESPONSES TO PHQ QUESTIONS 1-9: 0
2. FEELING DOWN, DEPRESSED OR HOPELESS: 0
SUM OF ALL RESPONSES TO PHQ QUESTIONS 1-9: 0

## 2022-06-28 NOTE — PROGRESS NOTES
Pt is being seen by Infectious Disease. Ran titre's and found Pt insufficient immunity to Diptheria. Recommended Tdap booster due to high risk of infection from previous nocardiosis episode. Immunization(s) given during visit:     Immunizations Administered     Name Date Dose Route    Tdap (Boostrix, Adacel) 6/28/2022 0.5 mL Intramuscular    Site: Deltoid- Left    LotThom Lesser    NDC: 62075-224-08           Patient instructed to remain in clinic for 20 minutes after injection and was advised to report any adverse reaction to me immediately.

## 2022-06-29 ENCOUNTER — HOSPITAL ENCOUNTER (OUTPATIENT)
Dept: CT IMAGING | Age: 62
Discharge: HOME | End: 2022-06-29
Payer: COMMERCIAL

## 2022-06-29 ENCOUNTER — HOSPITAL ENCOUNTER (OUTPATIENT)
Age: 62
Discharge: HOME | End: 2022-06-29
Payer: COMMERCIAL

## 2022-06-29 DIAGNOSIS — A43.0 PULMONARY NOCARDIOSIS (HCC): ICD-10-CM

## 2022-06-29 DIAGNOSIS — N39.43 BENIGN PROSTATIC HYPERPLASIA WITH POST-VOID DRIBBLING: ICD-10-CM

## 2022-06-29 DIAGNOSIS — N40.1 BENIGN PROSTATIC HYPERPLASIA WITH POST-VOID DRIBBLING: ICD-10-CM

## 2022-06-29 LAB
A/G RATIO: 1.7 (ref 1.1–2.2)
ALBUMIN SERPL-MCNC: 4.5 G/DL (ref 3.4–5)
ALP BLD-CCNC: 58 U/L (ref 40–129)
ALT SERPL-CCNC: 36 U/L (ref 10–40)
ANION GAP SERPL CALCULATED.3IONS-SCNC: 8 MMOL/L (ref 3–16)
AST SERPL-CCNC: 29 U/L (ref 15–37)
BASOPHILS ABSOLUTE: 0 K/UL (ref 0–0.2)
BASOPHILS RELATIVE PERCENT: 0.9 %
BILIRUB SERPL-MCNC: 0.7 MG/DL (ref 0–1)
BUN BLDV-MCNC: 18 MG/DL (ref 7–20)
CALCIUM SERPL-MCNC: 9.5 MG/DL (ref 8.3–10.6)
CHLORIDE BLD-SCNC: 106 MMOL/L (ref 99–110)
CO2: 28 MMOL/L (ref 21–32)
CREAT SERPL-MCNC: 0.9 MG/DL (ref 0.8–1.3)
EOSINOPHILS ABSOLUTE: 0 K/UL (ref 0–0.6)
EOSINOPHILS RELATIVE PERCENT: 0.1 %
GFR AFRICAN AMERICAN: >60
GFR NON-AFRICAN AMERICAN: >60
GLUCOSE BLD-MCNC: 124 MG/DL (ref 70–99)
HCT VFR BLD CALC: 46.4 % (ref 40.5–52.5)
HEMOGLOBIN: 15.7 G/DL (ref 13.5–17.5)
LYMPHOCYTES ABSOLUTE: 2.2 K/UL (ref 1–5.1)
LYMPHOCYTES RELATIVE PERCENT: 40.8 %
MCH RBC QN AUTO: 32.4 PG (ref 26–34)
MCHC RBC AUTO-ENTMCNC: 33.8 G/DL (ref 31–36)
MCV RBC AUTO: 95.7 FL (ref 80–100)
MONOCYTES ABSOLUTE: 0.9 K/UL (ref 0–1.3)
MONOCYTES RELATIVE PERCENT: 17 %
NEUTROPHILS ABSOLUTE: 2.2 K/UL (ref 1.7–7.7)
NEUTROPHILS RELATIVE PERCENT: 41.2 %
PDW BLD-RTO: 14.7 % (ref 12.4–15.4)
PLATELET # BLD: 187 K/UL (ref 135–450)
PMV BLD AUTO: 7.7 FL (ref 5–10.5)
POTASSIUM SERPL-SCNC: 4.9 MMOL/L (ref 3.5–5.1)
RBC # BLD: 4.84 M/UL (ref 4.2–5.9)
SODIUM BLD-SCNC: 142 MMOL/L (ref 136–145)
TOTAL PROTEIN: 7.2 G/DL (ref 6.4–8.2)
WBC # BLD: 5.4 K/UL (ref 4–11)

## 2022-06-29 PROCEDURE — 74177 CT ABD & PELVIS W/CONTRAST: CPT

## 2022-06-29 PROCEDURE — 6360000004 HC RX CONTRAST MEDICATION: Performed by: INTERNAL MEDICINE

## 2022-06-29 PROCEDURE — 80053 COMPREHEN METABOLIC PANEL: CPT

## 2022-06-29 PROCEDURE — 36415 COLL VENOUS BLD VENIPUNCTURE: CPT

## 2022-06-29 PROCEDURE — 85025 COMPLETE CBC W/AUTO DIFF WBC: CPT

## 2022-06-29 RX ADMIN — IOPAMIDOL 75 ML: 755 INJECTION, SOLUTION INTRAVENOUS at 09:35

## 2022-07-13 ENCOUNTER — HOSPITAL ENCOUNTER (OUTPATIENT)
Age: 62
Discharge: HOME | End: 2022-07-13
Payer: COMMERCIAL

## 2022-07-13 DIAGNOSIS — A43.9 NOCARDIOSIS: ICD-10-CM

## 2022-07-13 LAB
A/G RATIO: 1.6 (ref 1.1–2.2)
ALBUMIN SERPL-MCNC: 4.5 G/DL (ref 3.4–5)
ALP BLD-CCNC: 64 U/L (ref 40–129)
ALT SERPL-CCNC: 39 U/L (ref 10–40)
ANION GAP SERPL CALCULATED.3IONS-SCNC: 11 MMOL/L (ref 3–16)
AST SERPL-CCNC: 26 U/L (ref 15–37)
BASOPHILS ABSOLUTE: 0.1 K/UL (ref 0–0.2)
BASOPHILS RELATIVE PERCENT: 1.7 %
BILIRUB SERPL-MCNC: 0.7 MG/DL (ref 0–1)
BUN BLDV-MCNC: 18 MG/DL (ref 7–20)
CALCIUM SERPL-MCNC: 9.7 MG/DL (ref 8.3–10.6)
CHLORIDE BLD-SCNC: 101 MMOL/L (ref 99–110)
CO2: 28 MMOL/L (ref 21–32)
CREAT SERPL-MCNC: 0.8 MG/DL (ref 0.8–1.3)
EOSINOPHILS ABSOLUTE: 0.5 K/UL (ref 0–0.6)
EOSINOPHILS RELATIVE PERCENT: 9 %
GFR AFRICAN AMERICAN: >60
GFR NON-AFRICAN AMERICAN: >60
GLUCOSE BLD-MCNC: 104 MG/DL (ref 70–99)
HCT VFR BLD CALC: 48.5 % (ref 40.5–52.5)
HEMOGLOBIN: 16.5 G/DL (ref 13.5–17.5)
LYMPHOCYTES ABSOLUTE: 1.9 K/UL (ref 1–5.1)
LYMPHOCYTES RELATIVE PERCENT: 37.6 %
MCH RBC QN AUTO: 32.1 PG (ref 26–34)
MCHC RBC AUTO-ENTMCNC: 34 G/DL (ref 31–36)
MCV RBC AUTO: 94.1 FL (ref 80–100)
MONOCYTES ABSOLUTE: 0.7 K/UL (ref 0–1.3)
MONOCYTES RELATIVE PERCENT: 14.5 %
NEUTROPHILS ABSOLUTE: 1.9 K/UL (ref 1.7–7.7)
NEUTROPHILS RELATIVE PERCENT: 37.2 %
PDW BLD-RTO: 14.3 % (ref 12.4–15.4)
PLATELET # BLD: 171 K/UL (ref 135–450)
PMV BLD AUTO: 8.4 FL (ref 5–10.5)
POTASSIUM SERPL-SCNC: 4.6 MMOL/L (ref 3.5–5.1)
RBC # BLD: 5.15 M/UL (ref 4.2–5.9)
SODIUM BLD-SCNC: 140 MMOL/L (ref 136–145)
TOTAL PROTEIN: 7.4 G/DL (ref 6.4–8.2)
WBC # BLD: 5 K/UL (ref 4–11)

## 2022-07-13 PROCEDURE — 36415 COLL VENOUS BLD VENIPUNCTURE: CPT

## 2022-07-13 PROCEDURE — 85025 COMPLETE CBC W/AUTO DIFF WBC: CPT

## 2022-07-13 PROCEDURE — 80053 COMPREHEN METABOLIC PANEL: CPT

## 2022-07-14 ENCOUNTER — TELEMEDICINE (OUTPATIENT)
Dept: INFECTIOUS DISEASES | Age: 62
End: 2022-07-14
Payer: COMMERCIAL

## 2022-07-14 ENCOUNTER — TELEPHONE (OUTPATIENT)
Dept: INFECTIOUS DISEASES | Age: 62
End: 2022-07-14

## 2022-07-14 DIAGNOSIS — Z86.16 HISTORY OF 2019 NOVEL CORONAVIRUS DISEASE (COVID-19): ICD-10-CM

## 2022-07-14 DIAGNOSIS — Z51.81 THERAPEUTIC DRUG MONITORING: ICD-10-CM

## 2022-07-14 DIAGNOSIS — Z71.89 ENCOUNTER FOR MEDICATION COUNSELING: ICD-10-CM

## 2022-07-14 DIAGNOSIS — E66.09 CLASS 2 OBESITY DUE TO EXCESS CALORIES WITH BODY MASS INDEX (BMI) OF 35.0 TO 35.9 IN ADULT, UNSPECIFIED WHETHER SERIOUS COMORBIDITY PRESENT: ICD-10-CM

## 2022-07-14 DIAGNOSIS — G93.9 BRAIN LESION: ICD-10-CM

## 2022-07-14 DIAGNOSIS — N40.1 BENIGN PROSTATIC HYPERPLASIA WITH POST-VOID DRIBBLING: ICD-10-CM

## 2022-07-14 DIAGNOSIS — L40.9 PSORIASIS: ICD-10-CM

## 2022-07-14 DIAGNOSIS — N39.43 BENIGN PROSTATIC HYPERPLASIA WITH POST-VOID DRIBBLING: ICD-10-CM

## 2022-07-14 DIAGNOSIS — A43.0 PULMONARY NOCARDIOSIS (HCC): Primary | ICD-10-CM

## 2022-07-14 PROCEDURE — 99214 OFFICE O/P EST MOD 30 MIN: CPT | Performed by: INTERNAL MEDICINE

## 2022-07-14 RX ORDER — SULFAMETHOXAZOLE AND TRIMETHOPRIM 800; 160 MG/1; MG/1
1 TABLET ORAL EVERY 8 HOURS
Qty: 90 TABLET | Refills: 2 | Status: SHIPPED | OUTPATIENT
Start: 2022-07-14 | End: 2022-09-15 | Stop reason: SDUPTHER

## 2022-07-14 ASSESSMENT — ENCOUNTER SYMPTOMS
DIARRHEA: 0
ABDOMINAL PAIN: 0
TROUBLE SWALLOWING: 0
CONSTIPATION: 0
NAUSEA: 0
SHORTNESS OF BREATH: 0
SORE THROAT: 0
WHEEZING: 0
EYE DISCHARGE: 0
EYE REDNESS: 0
RHINORRHEA: 0
COUGH: 0
BACK PAIN: 0

## 2022-07-14 NOTE — PROGRESS NOTES
Infectious Diseases Oupatient Follow-up Note            Reason for Visit:               Pulmonary nocardiosis follow-up  Primary Care Physician:  ERICA Jamison NP  History Obtained From:   Patient , Medical Records     CHIEF COMPLAINT:    Chief Complaint   Patient presents with    Follow-up     pulmoary nocardiosis       INTERVAL HISTORY: Emerson Curry is a 60 y.o. pleasant male patient, who had a virtual visit with me today for follow-up. History was obtained from chart review and the patient. The patient had a virtual visit with me today for above mentioned complaints. 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 The patient was started on minocycline, Bactrim DS and IV ceftriaxone at CNS dose by me in February 1, 2022. 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 Repeat MRI done on 4/18/2022 showed resolution of that abscess. I stopped his IV ceftriaxone on 4/18/2022. The patient is on oral minocycline and oral Bactrim DS. His white cell count started to come down after few months and I reduce his Bactrim DS dose from 2 tablets every 8-hour to 2 tablet every 12 hours on 5/19/2022. The patient had a follow-up video visit with me today. Past Medical History:   Past medical and surgical history was reviewed by me during this visit in detail.     Past Medical History:   Diagnosis Date    BPH (benign prostatic hyperplasia)     Psoriasis        Past Surgical History:    Past Surgical History:   Procedure Laterality Date    COLONOSCOPY      CT NEEDLE BIOPSY LUNG PERCUTANEOUS  1/10/2022    CT NEEDLE BIOPSY LUNG PERCUTANEOUS 1/10/2022 [facility] CT SCAN    ESOPHAGUS SURGERY  2019    hernia    FOOT SURGERY  2019    bilateraly hammer toes    UPPER GASTROINTESTINAL headaches. Hematological: Does not bruise/bleed easily. Psychiatric/Behavioral: Negative for hallucinations and suicidal ideas. All other systems reviewed and are negative. PHYSICAL EXAM:      There were no vitals filed for this visit. Physical Exam  Constitutional:       General: He is not in acute distress. Appearance: Normal appearance. He is not ill-appearing or toxic-appearing. Comments: The patient was seen today via virtual video visit   HENT:      Head: Normocephalic. Right Ear: External ear normal.      Left Ear: External ear normal.      Nose: Nose normal.      Mouth/Throat:      Pharynx: No oropharyngeal exudate. Eyes:      General: No scleral icterus. Right eye: No discharge. Left eye: No discharge. Extraocular Movements: Extraocular movements intact. Pulmonary:      Effort: Pulmonary effort is normal.   Musculoskeletal:         General: No swelling. Normal range of motion. Cervical back: Normal range of motion. Skin:     Coloration: Skin is not jaundiced. Findings: No bruising or erythema. Neurological:      Mental Status: He is alert and oriented to person, place, and time. Mental status is at baseline. Gait: Gait normal.   Psychiatric:         Mood and Affect: Mood normal.         Behavior: Behavior normal.         Thought Content:  Thought content normal.         Judgment: Judgment normal.              DATA:  All available lab data wasreviewed by me during this visit    CBC:  Lab Results   Component Value Date    WBC 5.0 07/13/2022    HGB 16.5 07/13/2022    HCT 48.5 07/13/2022    MCV 94.1 07/13/2022     07/13/2022    LYMPHOPCT 37.6 07/13/2022    RBC 5.15 07/13/2022    MCH 32.1 07/13/2022    MCHC 34.0 07/13/2022    RDW 14.3 07/13/2022       Last BMP:  Lab Results   Component Value Date/Time     07/13/2022 09:37 AM    K 4.6 07/13/2022 09:37 AM    K 3.6 01/10/2022 04:18 AM     07/13/2022 09:37 AM    CO2 28 07/13/2022 09:37 AM    BUN 18 07/13/2022 09:37 AM    CREATININE 0.8 07/13/2022 09:37 AM    GLUCOSE 104 07/13/2022 09:37 AM    CALCIUM 9.7 07/13/2022 09:37 AM        Hepatic Function Panel:  Lab Results   Component Value Date/Time    ALKPHOS 64 07/13/2022 09:37 AM    ALT 39 07/13/2022 09:37 AM    AST 26 07/13/2022 09:37 AM    PROT 7.4 07/13/2022 09:37 AM    BILITOT 0.7 07/13/2022 09:37 AM    LABALBU 4.5 07/13/2022 09:37 AM       Last CK:  Lab Results   Component Value Date    CKTOTAL 91 06/08/2022       Last ESR:  Lab Results   Component Value Date    SEDRATE 6 05/12/2022       Last CRP:  Lab Results   Component Value Date    CRP <3.0 05/12/2022         Imaging: All pertinent images and reports for the current visit were reviewed by me during this visit. I reviewed the chest x-ray/CT scan/MRI images and independently interpreted the findings and results today. Outside records:    Labs, Microbiology,Radiology and pertinent results from Care everywhere, if available, were reviewed as a part of the consultation. Problem list:       Patient Active Problem List   Diagnosis Code    Benign prostatic hyperplasia with post-void dribbling N40.1, N39.43    Urinary frequency R35.0    Psoriasis L40.9    Pneumonia J18.9    Nocardiosis A43.9       Microbiology:       All available micro data was reviewed by me during this visit    · Lung tissue biopsy culture  - collected on January 10, 2022: Nocardia abscessus        Allergies and immunizations:       Known drug Allergies: All allergies data was reviewed by me during this visit  Patient has no known allergies. Immunizations: All immunizations were reviewed byme in detail. Immunization History   Administered Date(s) Administered    Tdap (Boostrix, Adacel) 09/30/2015, 06/25/2019, 06/28/2022       SocialHistory:  All social and epidemiologic history was reviewed and updated be me in detail today.     Social History     Socioeconomic History    Marital status:      Spouse name: Not on file    Number of children: Not on file    Years of education: Not on file    Highest education level: Not on file   Occupational History    Not on file   Tobacco Use    Smoking status: Never Smoker    Smokeless tobacco: Never Used   Substance and Sexual Activity    Alcohol use: Yes     Comment: occasionally    Drug use: Never    Sexual activity: Not on file   Other Topics Concern    Not on file   Social History Narrative    Not on file     Social Determinants of Health     Financial Resource Strain: Low Risk     Difficulty of Paying Living Expenses: Not hard at all   Food Insecurity: No Food Insecurity    Worried About Running Out of Food in the Last Year: Never true    [address] in the Last Year: Never true   Transportation Needs:     Lack of Transportation (Medical): Not on file    Lack of Transportation (Non-Medical):  Not on file   Physical Activity:     Days of Exercise per Week: Not on file    Minutes of Exercise per Session: Not on file   Stress:     Feeling of Stress : Not on file   Social Connections:     Frequency of Communication with Friends and Family: Not on file    Frequency of Social Gatherings with Friends and Family: Not on file    Attends Latter-day Services: Not on file    Active Member of [address] [place] [address] [employer] or Organizations: Not on file    Attends Club or Organization Meetings: Not on file    Marital Status: Not on file   Intimate Partner Violence:     Fear of Current or Ex-Partner: Not on file    Emotionally Abused: Not on file    Physically Abused: Not on file    Sexually Abused: Not on file   Housing Stability:     Unable to Pay for Housing in the Last Year: Not on file    Number of [place] in the Last Year: Not on file    Unstable Housing in the Last Year: Not on file       COVID VACCINATION AND LAB RESULT RECORDS:     Internal Administration   First Dose      Second Dose           Last COVID Lab SARS-CoV-2 (no units)   Date Value 12/14/2021 Not Detected     SARS-CoV-2, NAAT (no units)   Date Value   01/10/2022 DETECTED (AA)            IMPRESSION:    The patient is a 60 y.o. old male who  has a past medical history of BPH (benign prostatic hyperplasia) and Psoriasis. was seen today for following problems:    1. Pulmonary nocardiosis ([facility] [address]    2. Benign prostatic hyperplasia with post-void dribbling    3. Class 2 obesity due to excess calories with body mass index (BMI) of 35.0 to 35.9 in adult, unspecified whether serious comorbidity present    4. History of 2019 novel coronavirus disease (COVID-19)    5. Brain lesion    6. Encounter for medication counseling    7. Psoriasis    8. Therapeutic drug monitoring        Discussion:      The patient has been getting CBC and CMP every 2 weeks. His last set of labs was done yesterday. His white cell count was 5000 with hemoglobin of 16.5 and platelet count of 595,325. His serum creatinine was 0.8. Liver functions were okay with AST of 26 and ALT of 39    Last CD4 count was 663 on 5/12/2022 and CD8 count was 404 on the same date. He had a repeat CT scan of chest abdomen and pelvis with IV contrast done on 6/29/2022. I reviewed the CT images in detail and independently interpreted the results. It showed nonobstructing nephrolithiasis on the left side as an incidental finding. He had some scarring in the posterior aspect of the right upper lobe at the site of previous cavitated lesion. No obvious hilar or mediastinal lymphadenopathy, which is reassuring. No intra-abdominal lymphadenopathy. I discussed the CT findings in detail with the patient.   He is otherwise feeling well and is tolerating medications well without any problem    He is following up with immunology and has low diphtheria antibodies and is getting vaccinations for that      RECOMMENDATIONS:      Diagnostic Workup:    · Will order CBC and CMP to be done every 3-week instead of every 2 weeks while on antibiotics  · Will access Management:    · Continue to monitor i.v access sites forerythema, induration, discharge or tenderness. · As always, continue efforts to minimize tubes/lines/drains as clinically appropriate to reduce chances of line associated infections. Patient education and counseling:    · The patient was educated in detail about the side-effects of various antibiotics and things to watch for like new rashes, lip swelling, severereaction, worsening diarrhea, break through fever etc.  · Patient was instructed to stop antibiotics and call our office if these problems were to occur, or go to nearest ER ifexperiencing severe symptoms. · Discussed patient's condition and what to expect. All of the patient's questions were addressed in a satisfactory manner and patient verbalizedunderstanding all instructions. Follow-up:    · Follow-up with me in ID clinic or through video visit in 3 months    Eriberto Amaay is a 60 y.o. male being evaluated by a Virtual Visit encounter to address concerns as mentioned above. A caregiver was present when appropriate. Due to this being a TeleHealth encounter ([facility] public [facility] emergency), evaluation of the following organ systems was limited: Vitals/Constitutional/EENT/Resp/CV/GI//MS/Neuro/Skin/Heme-Lymph-Imm. Pursuant to the emergency declaration under the [facility], [address] [place] [address] authority and the [employer] Act, this Virtual Visit was conducted with patient's (and/or legal guardian's) consent, to reduce the patient's risk of exposure to COVID-19 and provide necessary medical care. The patient (and/or legal guardian) has also been advised to contact this office for worsening conditions or problems, and seek emergency medical treatment and/or call 911 if deemed necessary.      Services were provided through an audio and/or video synchronous discussion virtually to substitute for in-person clinic visit. Patient and provider were located at their individual homes. --Palak Page MD on 7/14/2022 at 10:28 AM    An electronic signature was used to authenticate this note. Level of complexity of visit: High       · Illness(es)/ Infection present that pose threat to bodily function. · There is potential for severe exacerbation of infection/side effects of treatment. · Therapy requires intensive monitoring for antimicrobial agent toxicity. Please note that this chart was generated using Dragon dictation software. Although every effort was made to ensure the accuracy of this automated transcription, some errors in transcription may have occurred inadvertently. If you may need any clarification, please do not hesitate to contact me through EPIC or at the phone number provided below with my electronic signature. Any pictures or media included in this note were obtained after taking informed verbal consent from the patient and with their approval to include those in the patient's medical record. Thankyou for involving me inthe care of your patient. If you have any additional questions, please do not hesitate to contact me.       Palak Page MD, MPH, [address]  7/14/2022, 10:28 AM  [facility]   [address] [facility], [address] [place] [address]  Office: [phone]  Fax: [phone]  Clinic days:  Tuesday & Thursday

## 2022-07-14 NOTE — TELEPHONE ENCOUNTER
Left message requesting a return call to schedule patient for a 3 MO VV F/U with Dr. Rosalynd Lanes per [place].

## 2022-08-11 ENCOUNTER — HOSPITAL ENCOUNTER (OUTPATIENT)
Age: 62
Discharge: HOME | End: 2022-08-11
Payer: COMMERCIAL

## 2022-08-11 DIAGNOSIS — A43.0 PULMONARY NOCARDIOSIS (HCC): ICD-10-CM

## 2022-08-11 LAB
A/G RATIO: 1.5 (ref 1.1–2.2)
ALBUMIN SERPL-MCNC: 4.4 G/DL (ref 3.4–5)
ALP BLD-CCNC: 58 U/L (ref 40–129)
ALT SERPL-CCNC: 35 U/L (ref 10–40)
ANION GAP SERPL CALCULATED.3IONS-SCNC: 10 MMOL/L (ref 3–16)
AST SERPL-CCNC: 26 U/L (ref 15–37)
BASOPHILS ABSOLUTE: 0.1 K/UL (ref 0–0.2)
BASOPHILS RELATIVE PERCENT: 1.2 %
BILIRUB SERPL-MCNC: 0.6 MG/DL (ref 0–1)
BUN BLDV-MCNC: 22 MG/DL (ref 7–20)
CALCIUM SERPL-MCNC: 8.9 MG/DL (ref 8.3–10.6)
CHLORIDE BLD-SCNC: 106 MMOL/L (ref 99–110)
CO2: 26 MMOL/L (ref 21–32)
CREAT SERPL-MCNC: 0.9 MG/DL (ref 0.8–1.3)
EOSINOPHILS ABSOLUTE: 0.4 K/UL (ref 0–0.6)
EOSINOPHILS RELATIVE PERCENT: 8.9 %
GFR AFRICAN AMERICAN: >60
GFR NON-AFRICAN AMERICAN: >60
GLUCOSE BLD-MCNC: 104 MG/DL (ref 70–99)
HCT VFR BLD CALC: 49.9 % (ref 40.5–52.5)
HEMOGLOBIN: 16.6 G/DL (ref 13.5–17.5)
LYMPHOCYTES ABSOLUTE: 1.9 K/UL (ref 1–5.1)
LYMPHOCYTES RELATIVE PERCENT: 38.4 %
MCH RBC QN AUTO: 31.6 PG (ref 26–34)
MCHC RBC AUTO-ENTMCNC: 33.2 G/DL (ref 31–36)
MCV RBC AUTO: 95.4 FL (ref 80–100)
MONOCYTES ABSOLUTE: 0.8 K/UL (ref 0–1.3)
MONOCYTES RELATIVE PERCENT: 15.6 %
NEUTROPHILS ABSOLUTE: 1.8 K/UL (ref 1.7–7.7)
NEUTROPHILS RELATIVE PERCENT: 35.9 %
PDW BLD-RTO: 13.4 % (ref 12.4–15.4)
PLATELET # BLD: 194 K/UL (ref 135–450)
PMV BLD AUTO: 7.9 FL (ref 5–10.5)
POTASSIUM SERPL-SCNC: 4.8 MMOL/L (ref 3.5–5.1)
RBC # BLD: 5.23 M/UL (ref 4.2–5.9)
SODIUM BLD-SCNC: 142 MMOL/L (ref 136–145)
TOTAL PROTEIN: 7.4 G/DL (ref 6.4–8.2)
WBC # BLD: 4.9 K/UL (ref 4–11)

## 2022-08-11 PROCEDURE — 36415 COLL VENOUS BLD VENIPUNCTURE: CPT

## 2022-08-11 PROCEDURE — 80053 COMPREHEN METABOLIC PANEL: CPT

## 2022-08-11 PROCEDURE — 85025 COMPLETE CBC W/AUTO DIFF WBC: CPT

## 2022-08-22 ENCOUNTER — OFFICE VISIT (OUTPATIENT)
Dept: PULMONOLOGY | Age: 62
End: 2022-08-22
Payer: COMMERCIAL

## 2022-08-22 VITALS
BODY MASS INDEX: 34.79 KG/M2 | DIASTOLIC BLOOD PRESSURE: 80 MMHG | SYSTOLIC BLOOD PRESSURE: 122 MMHG | OXYGEN SATURATION: 97 % | HEART RATE: 73 BPM | WEIGHT: 271 LBS

## 2022-08-22 DIAGNOSIS — A43.0 NOCARDIAL PNEUMONIA (HCC): Primary | ICD-10-CM

## 2022-08-22 PROCEDURE — 99214 OFFICE O/P EST MOD 30 MIN: CPT | Performed by: INTERNAL MEDICINE

## 2022-08-22 NOTE — PROGRESS NOTES
PULMONARY OFFICE FOLLOW UP NOTE    REASON FOR VISIT:   Chief Complaint   Patient presents with    Follow-up       DATE OF VISIT: 8/22/2022    HISTORY OF PRESENT ILLNESS: 60y.o. year old male  is here for follow-up of pulmonary nocardiosis. He has past medical history of psoriasis. Patient denies any shortness of breath or cough or wheezing or chest pain or hemoptysis. He denies any weight loss or night sweats or fever. He has been having great response to antibiotics for nocardia. He is being treated by infectious disease physician, Dr. Елена Low for pulmonary and brain nocardiosis. He was on oral minocycline, Bactrim and IV Rocephin since 2/1/2022. Now he is only on Bactrim. Patient had CT chest on 6/29/2022 that showed resolution of right upper lobe cavitary lesion with only minimal scarring left in that area. Repeat MRI brain from April 2022 showed resolution of brain abscess. Previously:  Patient was hospitalized in early January 2022 for not resolving pneumonia. He is a lifelong non-smoker. He presented to hospital with complaints of fever, cough productive of white sputum and hemoptysis for almost a month now. Patient symptoms started in early December. He had cough along with low-grade fever with night sweats. He was given outpatient Levaquin and steroid by his primary care physician on 12/14. Symptoms did not resolve. He underwent CT chest as outpatient on 12/23 that showed right upper lobe mass. Patient was referred to oncology by his primary care physician. He is also been complaining of hemoptysis which she described as blood mixed with sputum for the last 1 week. He saw Dr. Elke Palencia recently who referred him to pulmonary. Patient had an appointment with pulmonary on 1/24/2022.  However, symptoms did not resolve and were getting worse which prompted the patient to come to the hospital. He had a repeat CT chest that was negative for PE but showed the right upper lobe mass is not necrotic with right hilar lymphadenopathy. There is some element of postobstructive pneumonia along with bilateral pulm nodules. I personally reviewed and interpreted all images. In the hospital, patient was given vancomycin and Cefepime for 4 days and then discharged on Augmentin for another 5 days. Patient was also noted to be positive for COVID by rapid test.  For this reason, he could not undergo bronchoscopy. He underwent CT-guided biopsy that showed acute inflammation with necrosis and fibrosis. Stains were negative for fungal and AFB. 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 REVIEW OF SYSTEMS: 14 point ROS is negative beside mentioned in [address] CONSTITUTIONAL SYMPTOMS: The patient denies fever, fatigue, night sweats, weight loss or weight gain. HEENT: No vision changes. No tinnitus, Denies sinus pain. No hoarseness, or dysphagia. NECK: Patient denies swelling in the neck. CARDIOVASCULAR: Denies chest pain, palpitation, syncope. RESPIRATORY: Denies shortness of breath or cough. GASTROINTESTINAL: Denies nausea, abdominal pain or change in bowel function. GENITOURINARY: Denies obstructive symptoms. No history of incontinence. BREASTS: No masses or lumps in the breasts. SKIN: No rashes or itching. MUSCULOSKELETAL: Denies weakness or bone pain. NEUROLOGICAL: No headaches or seizures. PSYCHIATRIC: Denies mood swings or depression. ENDOCRINE: Denies heat or cold intolerance or excessive thirst.  HEMATOLOGIC/LYMPHATIC: Denies easy bruising or lymph node swelling. ALLERGIC/IMMUNOLOGIC: No environmental allergies.     PAST MEDICAL HISTORY:   Past Medical History:   Diagnosis Date    BPH (benign prostatic hyperplasia)     Psoriasis        PAST SURGICAL HISTORY:   Past Surgical History:   Procedure Laterality Date    COLONOSCOPY      CT NEEDLE BIOPSY LUNG PERCUTANEOUS  1/10/2022    CT NEEDLE BIOPSY LUNG PERCUTANEOUS 1/10/2022 F CT SCAN    ESOPHAGUS SURGERY  2019    hernia    FOOT SURGERY  2019 bilateraly hammer toes    UPPER GASTROINTESTINAL ENDOSCOPY         SOCIAL HISTORY:   Social History     Tobacco Use    Smoking status: Never    Smokeless tobacco: Never   Substance Use Topics    Alcohol use: Yes     Comment: occasionally    Drug use: Never       FAMILY HISTORY:   Family History   Problem Relation Age of Onset    High Blood Pressure Mother     High Cholesterol Mother        MEDICATIONS:     Current Outpatient Medications on File Prior to Visit   Medication Sig Dispense Refill    sulfamethoxazole-trimethoprim (BACTRIM DS;SEPTRA DS) 800-160 MG per tablet Take 1 tablet by mouth every 8 hours 90 tablet 2    Lactobacillus (ACIDOPHILUS PROBIOTIC) 0.5 MG TABS TAKE ONE CAPSULE BY MOUTH TWICE A DAY 60 tablet 2    predniSONE (DELTASONE) 10 MG tablet Take 4 tablets daily for 3 days, then 3 tablets daily for 3 days, then 2 tablets daily for 3 days then 1 tablet daily until finished. (Patient not taking: Reported on 7/14/2022) 30 tablet 0    finasteride (PROSCAR) 5 MG tablet Take 1 tablet by mouth daily 30 tablet 11    clobetasol (TEMOVATE) 0.05 % cream Apply topically daily Apply topically once daily 60 g 11    tamsulosin (FLOMAX) 0.4 MG capsule Take 1 capsule by mouth daily 30 capsule 11     No current facility-administered medications on file prior to visit. ALLERGIES:   Allergies as of 08/22/2022    (No Known Allergies)      OBJECTIVE:   weight is 271 lb (122.9 kg). His blood pressure is 122/80 and his pulse is 73. His oxygen saturation is 97%. PHYSICAL EXAM:    CONSTITUTIONAL: He is a 60y.o.-year-old who appears his stated age. He is alert and oriented x 3 and in no acute distress. HEENT: PERRL. No scleral icterus. No thrush, atraumatic, normocephalic. NECK: Supple, without cervical or supraclavicular lymphadenopathy:  CARDIOVASCULAR: S1 S2 RRR. Without murmer  RESPIRATORY & CHEST: Lungs are clear to auscultation and percussion. No wheezing, no crackles.  Good air to software limitations. If there are questions or concerns about the content of this note of information contained within the body of this dictation they should be addressed with the provider for clarification.

## 2022-09-15 RX ORDER — SULFAMETHOXAZOLE AND TRIMETHOPRIM 800; 160 MG/1; MG/1
1 TABLET ORAL EVERY 8 HOURS
Qty: 135 TABLET | Refills: 0 | Status: SHIPPED | OUTPATIENT
Start: 2022-09-15 | End: 2022-10-26 | Stop reason: SDUPTHER

## 2022-09-19 ENCOUNTER — HOSPITAL ENCOUNTER (OUTPATIENT)
Age: 62
Discharge: HOME | End: 2022-09-19
Payer: COMMERCIAL

## 2022-09-19 ENCOUNTER — TELEPHONE (OUTPATIENT)
Dept: FAMILY MEDICINE CLINIC | Age: 62
End: 2022-09-19

## 2022-09-19 DIAGNOSIS — N40.1 BENIGN PROSTATIC HYPERPLASIA WITH POST-VOID DRIBBLING: ICD-10-CM

## 2022-09-19 DIAGNOSIS — N39.43 BENIGN PROSTATIC HYPERPLASIA WITH POST-VOID DRIBBLING: ICD-10-CM

## 2022-09-19 DIAGNOSIS — R35.0 URINARY FREQUENCY: ICD-10-CM

## 2022-09-19 DIAGNOSIS — A43.0 PULMONARY NOCARDIOSIS (HCC): ICD-10-CM

## 2022-09-19 LAB
A/G RATIO: 1.4 (ref 1.1–2.2)
ALBUMIN SERPL-MCNC: 4.2 G/DL (ref 3.4–5)
ALP BLD-CCNC: 58 U/L (ref 40–129)
ALT SERPL-CCNC: 22 U/L (ref 10–40)
ANION GAP SERPL CALCULATED.3IONS-SCNC: 10 MMOL/L (ref 3–16)
AST SERPL-CCNC: 16 U/L (ref 15–37)
BASOPHILS ABSOLUTE: 0.1 K/UL (ref 0–0.2)
BASOPHILS RELATIVE PERCENT: 1 %
BILIRUB SERPL-MCNC: 0.6 MG/DL (ref 0–1)
BUN BLDV-MCNC: 24 MG/DL (ref 7–20)
CALCIUM SERPL-MCNC: 9.3 MG/DL (ref 8.3–10.6)
CHLORIDE BLD-SCNC: 105 MMOL/L (ref 99–110)
CO2: 27 MMOL/L (ref 21–32)
CREAT SERPL-MCNC: 1 MG/DL (ref 0.8–1.3)
EOSINOPHILS ABSOLUTE: 0.6 K/UL (ref 0–0.6)
EOSINOPHILS RELATIVE PERCENT: 10.9 %
GFR AFRICAN AMERICAN: >60
GFR NON-AFRICAN AMERICAN: >60
GLUCOSE BLD-MCNC: 93 MG/DL (ref 70–99)
HCT VFR BLD CALC: 50.1 % (ref 40.5–52.5)
HEMOGLOBIN: 16.9 G/DL (ref 13.5–17.5)
LYMPHOCYTES ABSOLUTE: 2.4 K/UL (ref 1–5.1)
LYMPHOCYTES RELATIVE PERCENT: 40.2 %
MCH RBC QN AUTO: 31 PG (ref 26–34)
MCHC RBC AUTO-ENTMCNC: 33.7 G/DL (ref 31–36)
MCV RBC AUTO: 92.1 FL (ref 80–100)
MONOCYTES ABSOLUTE: 0.9 K/UL (ref 0–1.3)
MONOCYTES RELATIVE PERCENT: 14.7 %
NEUTROPHILS ABSOLUTE: 2 K/UL (ref 1.7–7.7)
NEUTROPHILS RELATIVE PERCENT: 33.2 %
PDW BLD-RTO: 13.6 % (ref 12.4–15.4)
PLATELET # BLD: 196 K/UL (ref 135–450)
PMV BLD AUTO: 8.3 FL (ref 5–10.5)
POTASSIUM SERPL-SCNC: 4.7 MMOL/L (ref 3.5–5.1)
RBC # BLD: 5.44 M/UL (ref 4.2–5.9)
SODIUM BLD-SCNC: 142 MMOL/L (ref 136–145)
TOTAL PROTEIN: 7.3 G/DL (ref 6.4–8.2)
WBC # BLD: 5.9 K/UL (ref 4–11)

## 2022-09-19 PROCEDURE — 36415 COLL VENOUS BLD VENIPUNCTURE: CPT

## 2022-09-19 PROCEDURE — 85025 COMPLETE CBC W/AUTO DIFF WBC: CPT

## 2022-09-19 PROCEDURE — 80053 COMPREHEN METABOLIC PANEL: CPT

## 2022-09-19 RX ORDER — TAMSULOSIN HYDROCHLORIDE 0.4 MG/1
0.4 CAPSULE ORAL DAILY
Qty: 60 CAPSULE | Refills: 0 | Status: SHIPPED | OUTPATIENT
Start: 2022-09-19

## 2022-09-19 NOTE — TELEPHONE ENCOUNTER
tamsulosin (FLOMAX) 0.4 MG capsule 30 capsule 11 8/24/2021     Sig - Route:  Take 1 capsule by mouth daily - Oral      Krreinier on aaron in chart

## 2022-10-10 ENCOUNTER — HOSPITAL ENCOUNTER (OUTPATIENT)
Age: 62
Discharge: HOME | End: 2022-10-10
Payer: COMMERCIAL

## 2022-10-10 DIAGNOSIS — A43.0 PULMONARY NOCARDIOSIS (HCC): ICD-10-CM

## 2022-10-10 LAB
A/G RATIO: 1.5 (ref 1.1–2.2)
ALBUMIN SERPL-MCNC: 4.6 G/DL (ref 3.4–5)
ALP BLD-CCNC: 58 U/L (ref 40–129)
ALT SERPL-CCNC: 23 U/L (ref 10–40)
ANION GAP SERPL CALCULATED.3IONS-SCNC: 11 MMOL/L (ref 3–16)
AST SERPL-CCNC: 15 U/L (ref 15–37)
BASOPHILS ABSOLUTE: 0.1 K/UL (ref 0–0.2)
BASOPHILS RELATIVE PERCENT: 0.9 %
BILIRUB SERPL-MCNC: 0.6 MG/DL (ref 0–1)
BUN BLDV-MCNC: 27 MG/DL (ref 7–20)
CALCIUM SERPL-MCNC: 9.9 MG/DL (ref 8.3–10.6)
CHLORIDE BLD-SCNC: 105 MMOL/L (ref 99–110)
CO2: 28 MMOL/L (ref 21–32)
CREAT SERPL-MCNC: 1 MG/DL (ref 0.8–1.3)
EOSINOPHILS ABSOLUTE: 0.5 K/UL (ref 0–0.6)
EOSINOPHILS RELATIVE PERCENT: 8.5 %
GFR AFRICAN AMERICAN: >60
GFR NON-AFRICAN AMERICAN: >60
GLUCOSE BLD-MCNC: 106 MG/DL (ref 70–99)
HCT VFR BLD CALC: 50.5 % (ref 40.5–52.5)
HEMOGLOBIN: 16.7 G/DL (ref 13.5–17.5)
LYMPHOCYTES ABSOLUTE: 2.3 K/UL (ref 1–5.1)
LYMPHOCYTES RELATIVE PERCENT: 40.6 %
MCH RBC QN AUTO: 30.3 PG (ref 26–34)
MCHC RBC AUTO-ENTMCNC: 33 G/DL (ref 31–36)
MCV RBC AUTO: 91.9 FL (ref 80–100)
MONOCYTES ABSOLUTE: 0.6 K/UL (ref 0–1.3)
MONOCYTES RELATIVE PERCENT: 11.3 %
NEUTROPHILS ABSOLUTE: 2.2 K/UL (ref 1.7–7.7)
NEUTROPHILS RELATIVE PERCENT: 38.7 %
PDW BLD-RTO: 14 % (ref 12.4–15.4)
PLATELET # BLD: 187 K/UL (ref 135–450)
PMV BLD AUTO: 8.5 FL (ref 5–10.5)
POTASSIUM SERPL-SCNC: 5.1 MMOL/L (ref 3.5–5.1)
RBC # BLD: 5.49 M/UL (ref 4.2–5.9)
SODIUM BLD-SCNC: 144 MMOL/L (ref 136–145)
TOTAL PROTEIN: 7.6 G/DL (ref 6.4–8.2)
WBC # BLD: 5.7 K/UL (ref 4–11)

## 2022-10-10 PROCEDURE — 36415 COLL VENOUS BLD VENIPUNCTURE: CPT

## 2022-10-10 PROCEDURE — 80053 COMPREHEN METABOLIC PANEL: CPT

## 2022-10-10 PROCEDURE — 86317 IMMUNOASSAY INFECTIOUS AGENT: CPT

## 2022-10-10 PROCEDURE — 85025 COMPLETE CBC W/AUTO DIFF WBC: CPT

## 2022-10-12 LAB — MISCELLANEOUS LAB TEST ORDER: NORMAL

## 2022-10-18 ENCOUNTER — HOSPITAL ENCOUNTER (OUTPATIENT)
Dept: CT IMAGING | Age: 62
Discharge: HOME | End: 2022-10-18
Payer: COMMERCIAL

## 2022-10-18 DIAGNOSIS — A43.0 PULMONARY NOCARDIOSIS (HCC): ICD-10-CM

## 2022-10-18 PROCEDURE — 71260 CT THORAX DX C+: CPT

## 2022-10-18 PROCEDURE — 6360000004 HC RX CONTRAST MEDICATION: Performed by: INTERNAL MEDICINE

## 2022-10-18 RX ADMIN — IOPAMIDOL 75 ML: 755 INJECTION, SOLUTION INTRAVENOUS at 07:50

## 2022-10-26 ENCOUNTER — TELEMEDICINE (OUTPATIENT)
Dept: INFECTIOUS DISEASES | Age: 62
End: 2022-10-26
Payer: COMMERCIAL

## 2022-10-26 DIAGNOSIS — E66.09 CLASS 2 OBESITY DUE TO EXCESS CALORIES WITH BODY MASS INDEX (BMI) OF 35.0 TO 35.9 IN ADULT, UNSPECIFIED WHETHER SERIOUS COMORBIDITY PRESENT: ICD-10-CM

## 2022-10-26 DIAGNOSIS — A43.0 PULMONARY NOCARDIOSIS (HCC): Primary | ICD-10-CM

## 2022-10-26 DIAGNOSIS — N40.1 BENIGN PROSTATIC HYPERPLASIA WITH POST-VOID DRIBBLING: ICD-10-CM

## 2022-10-26 DIAGNOSIS — Z86.16 HISTORY OF 2019 NOVEL CORONAVIRUS DISEASE (COVID-19): ICD-10-CM

## 2022-10-26 DIAGNOSIS — N39.43 BENIGN PROSTATIC HYPERPLASIA WITH POST-VOID DRIBBLING: ICD-10-CM

## 2022-10-26 DIAGNOSIS — Z71.89 ENCOUNTER FOR MEDICATION COUNSELING: ICD-10-CM

## 2022-10-26 DIAGNOSIS — L40.9 PSORIASIS: ICD-10-CM

## 2022-10-26 DIAGNOSIS — Z71.3 WEIGHT LOSS COUNSELING, ENCOUNTER FOR: ICD-10-CM

## 2022-10-26 DIAGNOSIS — Z51.81 THERAPEUTIC DRUG MONITORING: ICD-10-CM

## 2022-10-26 PROCEDURE — 99215 OFFICE O/P EST HI 40 MIN: CPT | Performed by: INTERNAL MEDICINE

## 2022-10-26 RX ORDER — SULFAMETHOXAZOLE AND TRIMETHOPRIM 800; 160 MG/1; MG/1
1 TABLET ORAL EVERY 8 HOURS
Qty: 90 TABLET | Refills: 3 | Status: SHIPPED | OUTPATIENT
Start: 2022-10-26 | End: 2023-02-23

## 2022-10-26 ASSESSMENT — ENCOUNTER SYMPTOMS
DIARRHEA: 0
SHORTNESS OF BREATH: 0
RHINORRHEA: 0
ABDOMINAL PAIN: 0
SINUS PAIN: 0
BACK PAIN: 0
NAUSEA: 0
WHEEZING: 0
COUGH: 0
EYE REDNESS: 0
CONSTIPATION: 0
SINUS PRESSURE: 0
EYE DISCHARGE: 0
SORE THROAT: 0

## 2022-10-26 NOTE — PROGRESS NOTES
Infectious Diseases Oupatient Follow-up Note            Reason for Visit:               Follow-up for pulmonary nocardiosis  Primary Care Physician:  ERICA Cardoso NP  History Obtained From:   Patient , Medical Records     CHIEF COMPLAINT:    Chief Complaint   Patient presents with    Follow-up     Pulmonary ncardiosis       INTERVAL HISTORY: Frederik Osler is a 60 y.o. pleasant male patient, who had an outpatient visit with me today for follow-up. History was obtained from chart review and the patient. The patient had a virtual clinic visit with me today for above mentioned complaints. 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 He was started on minocycline, Bactrim DS and IV ceftriaxone and seen as told by me on February 1, 2022. 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 I started ceftriaxone on 4/18/2022 and continued on the minocycline oral Bactrim DS. Patient started having leukopenia issues after few months and hence I reduce the oral Bactrim dose from 2 tablets every 8 hours to 2 tablet every 12 hours on 5/19/2022. I reduce his oral Bactrim DS dose further to 1 tablet every 8 hour during his last video visit with me on 7/14/2022 and stop his oral minocycline at that time. The patient had a follow-up video visit with me today. Past Medical History:   Past medical and surgical history was reviewed by me during this visit in detail.     Past Medical History:   Diagnosis Date    BPH (benign prostatic hyperplasia)     Psoriasis        Past Surgical History:    Past Surgical History:   Procedure Laterality Date    COLONOSCOPY      CT NEEDLE BIOPSY LUNG PERCUTANEOUS  1/10/2022    CT NEEDLE BIOPSY LUNG PERCUTANEOUS 1/10/2022 [facility] CT SCAN    ESOPHAGUS SURGERY  2019    hernia    FOOT SURGERY  2019    bilateraly hammer toes    UPPER GASTROINTESTINAL ENDOSCOPY         Current Medications: All medications were reviewed by me during this visit  Current Outpatient Medications   Medication Sig Dispense Refill    sulfamethoxazole-trimethoprim (BACTRIM DS;SEPTRA DS) 800-160 MG per tablet Take 1 tablet by mouth in the morning and 1 tablet at noon and 1 tablet in the evening. 90 tablet 3    tamsulosin (FLOMAX) 0.4 MG capsule Take 1 capsule by mouth daily 60 capsule 0    Lactobacillus (ACIDOPHILUS PROBIOTIC) 0.5 MG TABS TAKE ONE CAPSULE BY MOUTH TWICE A DAY 60 tablet 2    finasteride (PROSCAR) 5 MG tablet Take 1 tablet by mouth daily 30 tablet 11    clobetasol (TEMOVATE) 0.05 % cream Apply topically daily Apply topically once daily 60 g 11     No current facility-administered medications for this visit. Family history: All family history was reviewed by me today    Family History   Problem Relation Age of Onset    High Blood Pressure Mother     High Cholesterol Mother        No family history of immunocompromising or autoimmune conditions. No h/o TB in in family or contacts    REVIEW OF SYSTEMS:      Review of Systems   Constitutional:  Negative for chills, diaphoresis and fever. HENT:  Negative for ear discharge, ear pain, postnasal drip, rhinorrhea, sinus pressure, sinus pain and sore throat. Eyes:  Negative for discharge and redness. Respiratory:  Negative for cough, shortness of breath and wheezing. Cardiovascular:  Negative for chest pain and leg swelling. Gastrointestinal:  Negative for abdominal pain, constipation, diarrhea and nausea. Endocrine: Negative for cold intolerance, heat intolerance and polydipsia. Genitourinary:  Negative for dysuria, flank pain, frequency, hematuria and urgency. Musculoskeletal:  Negative for back pain and myalgias. Skin:  Negative for rash. Allergic/Immunologic: Negative for immunocompromised state. Neurological:  Negative for dizziness, seizures and headaches. Hematological:  Does not bruise/bleed easily. Psychiatric/Behavioral:  Negative for agitation, hallucinations and suicidal ideas. The patient is not nervous/anxious. All other systems reviewed and are negative. PHYSICAL EXAM:      There were no vitals filed for this visit. Physical Exam  Constitutional:       General: He is not in acute distress. Appearance: Normal appearance. He is not ill-appearing or toxic-appearing. Comments: The patient was seen today via virtual video visit   HENT:      Head: Normocephalic. Right Ear: External ear normal.      Left Ear: External ear normal.      Nose: Nose normal.      Mouth/Throat:      Pharynx: No oropharyngeal exudate. Eyes:      General: No scleral icterus. Right eye: No discharge. Left eye: No discharge. Extraocular Movements: Extraocular movements intact. Pulmonary:      Effort: Pulmonary effort is normal.   Musculoskeletal:         General: No swelling. Normal range of motion. Cervical back: Normal range of motion. Skin:     Coloration: Skin is not jaundiced. Findings: No bruising or erythema. Neurological:      Mental Status: He is alert and oriented to person, place, and time. Mental status is at baseline. Gait: Gait normal.   Psychiatric:         Mood and Affect: Mood normal.         Behavior: Behavior normal.         Thought Content:  Thought content normal.         Judgment: Judgment normal.            DATA:  All available lab data wasreviewed by me during this visit    CBC:  Lab Results   Component Value Date    WBC 5.7 10/10/2022    HGB 16.7 10/10/2022    HCT 50.5 10/10/2022    MCV 91.9 10/10/2022     10/10/2022    LYMPHOPCT 40.6 10/10/2022    RBC 5.49 10/10/2022    MCH 30.3 10/10/2022    MCHC 33.0 10/10/2022    RDW 14.0 10/10/2022       Last BMP:  Lab Results   Component Value Date/Time     10/10/2022 09:30 AM    K 5.1 10/10/2022 09:30 AM    K 3.6 01/10/2022 04:18 AM     10/10/2022 09:30 AM    CO2 28 10/10/2022 09:30 AM    BUN 27 10/10/2022 09:30 AM    CREATININE 1.0 10/10/2022 09:30 AM    GLUCOSE 106 10/10/2022 09:30 AM    CALCIUM 9.9 10/10/2022 09:30 AM        Hepatic Function Panel:  Lab Results   Component Value Date/Time    ALKPHOS 58 10/10/2022 09:30 AM    ALT 23 10/10/2022 09:30 AM    AST 15 10/10/2022 09:30 AM    PROT 7.6 10/10/2022 09:30 AM    BILITOT 0.6 10/10/2022 09:30 AM    LABALBU 4.6 10/10/2022 09:30 AM       Last CK:  Lab Results   Component Value Date    CKTOTAL 91 06/08/2022       Last ESR:  Lab Results   Component Value Date    SEDRATE 6 05/12/2022       Last CRP:  Lab Results   Component Value Date    CRP <3.0 05/12/2022         Imaging: All pertinent images and reports for the current visit were reviewed by me during this visit. I reviewed the chest x-ray/CT scan/MRI images and independently interpreted the findings and results today. Outside records:    Labs, Microbiology,Radiology and pertinent results from Care everywhere, if available, were reviewed as a part of the consultation.     Problem list:       Patient Active Problem List   Diagnosis Code    Benign prostatic hyperplasia with post-void dribbling N40.1, N39.43    Urinary frequency R35.0    Psoriasis L40.9    Pneumonia J18.9    Nocardiosis A43.9       Microbiology:       All available micro data was reviewed by me during this visit    Lung tissue culture  - collected on 1/10/2022: Nocardia abscessus     Contains abnormal data Culture, Anaerobic and Aerobic  Order: [id number]  Status: Edited Result - FINAL    Visible to patient: Yes (not seen)    Next appt: 02/01/2023 at 09:40 AM in Infectious Diseases (Chula Gavlan MD)    Specimen Information: Lung   0 Result Notes  Component 1/10/22 1340  Resulting Agency   Gram Stain Result 4+ WBC's (Polymorphonuclear)   No organisms seen  [facility]   Anaerobic Culture No anaerobes isolated  [address] [facility]   Organism Nocardia abscessus Abnormal   [address] [facility]   WOUND/ABSCESS Light growth   Identification completed by Alexia Kumar completed by [id number] [facility]           Narrative  Performed by: [facility]  ORDER#: [id number]                          ORDERED BY: [employer]   SOURCE: Lung                               COLLECTED:  01/10/22 13:40   ANTIBIOTICS AT CARLA.:                      RECEIVED :  01/21/22 13:31   Performed at:   [facility] [address], [facility], [address]   Phone [phone]      Specimen Collected: 01/10/22 13:40 EST Last Resulted: 01/24/22 12: Allergies and immunizations:       Known drug Allergies: All allergies data was reviewed by me during this visit  Patient has no known allergies. Immunizations: All immunizations were reviewed byme in detail. Immunization History   Administered Date(s) Administered    Tdap (Boostrix, Adacel) 09/30/2015, 06/25/2019, 06/28/2022       SocialHistory:  All social and epidemiologic history was reviewed and updated be me in detail today.     Social History     Socioeconomic History    Marital status:    Tobacco Use    Smoking status: Never    Smokeless tobacco: Never   Substance and Sexual Activity    Alcohol use: Yes     Comment: occasionally    Drug use: Never     Social Determinants of Health     Financial Resource Strain: Low Risk     Difficulty of Paying Living Expenses: Not hard at all   Food Insecurity: No Food Insecurity    Worried About [address] [place] [address] in the Last Year: Never true    Ran Out of Food in the Last Year: Never true       COVID VACCINATION AND LAB RESULT RECORDS:     Internal Administration   First Dose      Second Dose           Last COVID Lab SARS-CoV-2 (no units)   Date Value   12/14/2021 Not Detected SARS-CoV-2, NAAT (no units)   Date Value   01/10/2022 DETECTED (AA)            IMPRESSION:    The patient is a 60 y.o. old male who  has a past medical history of BPH (benign prostatic hyperplasia) and Psoriasis. was seen today for following problems:    1. Pulmonary nocardiosis ([address]    2. Benign prostatic hyperplasia with post-void dribbling    3. Class 2 obesity due to excess calories with body mass index (BMI) of 35.0 to 35.9 in adult, unspecified whether serious comorbidity present    4. History of 2019 novel coronavirus disease (COVID-19)    5. Encounter for medication counseling    6. Therapeutic drug monitoring    7. Psoriasis    8. Weight loss counseling, encounter for        Discussion:      The patient has been following up with a neurology for low diphtheria antibodies. He has been getting oral Bactrim for pulmonary nocardiosis and also had CNS lesions which were treated with IV ceftriaxone. His last set of labs was done on 10/10/2022. His serum creatinine was 1.0. Liver functions were okay. His blood counts were okay with white cell count of 5700, hemoglobin of 16.7 and platelet count of 454,678. I had a long discussion with the patient today. I also reviewed all of his prior CT scans of the chest with him in detail with the help of the screen share feature of [employer]. me. Here are some of the images of CT scan of his chest from January, April and October 18 of this year. As clearly visible, the pulmonary nocardiosis lesion is improving very nicely                  RECOMMENDATIONS:      Diagnostic Workup: Will order CBC and CMP to be done every 3 weeks for therapeutic drug monitoring while on Bactrim    Continue to follow fever curve  at home      Antimicrobials:     The patient at this time is on oral Bactrim DS 1 tablet every 8 hours  He is tolerating the antibiotic okay  I had a long discussion with the patient today  The recommended course for pulmonary nocardiosis is for at least 1 year  The patient is following up closely with immunology for low diphtheria antibodies and his last today. Antibiotic check on October 10 showed a level of 0.1, which is finally within the desired protective range  I offered the patient to continue oral Bactrim DS 1 tablet every 8 hour for 4 more months to complete a 1 year course of treatment of her bradycardia since. The patient is okay with that  The patient does not have any residual shortness of breath or coughing or headache issues  Discussed the importance of antibiotic compliance with the patient  Discussed the importance of maintaining good oral hydration  The patient has not received his COVID shots or his influenza vaccinations or pneumonia vaccinations yet. I have advised him to discuss with his immunologist.  I strongly recommend that he should get up-to-date on his COVID-19 shots and influenza shots and Prevnar 20 vaccinations  All of patient's questions and concerns were addressed to the best of his satisfaction  Discussed with patient the strategies to stay safe from [address] 19 exposures including safe social distancing, frequent and proper hand hygiene when outside and using 3 layered mouth and nose coverings/facemasks when outside their home. Labs ordered today in EPIC:    Orders Placed This Encounter   Procedures    CBC with Auto Differential     Standing Status:   Standing     Number of Occurrences:   7     Standing Expiration Date:   10/26/2023    Comprehensive Metabolic Panel     Standing Status:   Standing     Number of Occurrences:   7     Standing Expiration Date:   10/26/2023       Medications ordered today in EPIC:    Orders Placed This Encounter   Medications    sulfamethoxazole-trimethoprim (BACTRIM DS;SEPTRA DS) 800-160 MG per tablet     Sig: Take 1 tablet by mouth in the morning and 1 tablet at noon and 1 tablet in the evening.      Dispense:  90 tablet     Refill:  3       Drug Monitoring:    Monitor for antibiotic toxicity as follows: CBC, CMP, every 3 weeks to be collected every Monday or Tuesday. Fax above results to [phone]. Weight loss counseling:    Extensive weight loss counseling was done. It is important to set a realistic weight loss goal. First goal should be to avoid gaining more weight and staying at current weight (or within 5 percent). People at high risk of developing diabetes who are able to lose 5 percent of their body weight and maintain this weight will reduce their risk of developing diabetes by about 50 percent and reduce their blood pressure. Losing more than 15 percent of  body weight and staying at this weight is an extremely good result, even if you never reach your \"dream\" or \"ideal\" weight. Lifestyle changes including changing eating habits, substituting excess carbohydrates with proteins, stress reduction, using self-help programs like Weight Watchers®, Overeaters Anonymous®, and Take Off Pounds Sensibly (TOPS)© , following DASH diet and increasing exercise or walking briskly daily for half hour to and hour 5-7 days a week was suggested among other measures. Information was given about various weight loss education programs and their websites like www.cdc.gov/healthyweight, www.choosemyplate.gov and www.health.gov/dietaryguidelines/       Patient education and counseling: The patient was educated in detail about the side-effects of various antibiotics and things to watch for like new rashes, lip swelling, severereaction, worsening diarrhea, break through fever etc.  Patient was instructed to stop antibiotics and call our office if these problems were to occur, or go to nearest ER ifexperiencing severe symptoms. Discussed patient's condition and what to expect. All of the patient's questions were addressed in a satisfactory manner and patient verbalizedunderstanding all instructions.     Follow-up:    Follow-up with me in ID clinic or through video visit in 3-4 months    Level of complexity of visit and medical decision making: High     Illness(es)/ Infection present that pose threat to bodily function. There is potential for severe exacerbation of infection/side effects of treatment. Therapy requires regular monitoring for antimicrobial agent toxicity. Zeus Salazar is a 60 y.o. male being evaluated by a Virtual Visit encounter to address concerns as mentioned above. A caregiver was present when appropriate. Due to this being a TeleHealth encounter (During [facility]-18 public health emergency), evaluation of the following organ systems was limited: Vitals/Constitutional/EENT/Resp/CV/GI//MS/Neuro/Skin/Heme-Lymph-Imm. Pursuant to the emergency declaration under the [address] [place] [address] authority and the [employer] Act, this Virtual Visit was conducted with patient's (and/or legal guardian's) consent, to reduce the patient's risk of exposure to COVID-19 and provide necessary medical care. The patient (and/or legal guardian) has also been advised to contact this office for worsening conditions or problems, and seek emergency medical treatment and/or call 911 if deemed necessary. Services were provided through an audio and/or video synchronous discussion virtually to substitute for in-person clinic visit. Patient and provider were located at their individual homes. --Alfonzo Schlatter, MD on 10/26/2022 at 10:30 AM         An electronic signature was used to authenticate this note. TIME SPENT TODAY:    - Spent over 41 minutes on visit (including interval history, physical exam, review of data including labs,cultures, imaging, development and implementation of treatment plan and coordination of care). - Over 50% of face-to-face time spent with pt counseling andeducation. Please note that this chart was generated using Dragon dictation software.  Although every effort was made to ensure the accuracy of this automated transcription, some errors in transcription may have occurred inadvertently. If you may need any clarification, please do not hesitate to contact me through EPIC or at the phone number provided below with my electronic signature. Any pictures or media included in this note were obtained after taking informed verbal consent from the patient and with their approval to include those in the patient's medical record. Thankyou for involving me inthe care of your patient. If you have any additional questions, please do not hesitate to contact me. Jane Perry MD, MPH, FACP, FIDSA  10/26/2022, 10:30 AM  [facility] Infectious Disease   [address] [place] [address], [address] [place] [address]  Office: [phone]  Fax: [phone]  In-person Clinic days:  Tuesday & Thursday a.m. Virtual clinic days: Monday, Wednesday & Friday a.m.

## 2022-11-15 ENCOUNTER — TELEPHONE (OUTPATIENT)
Dept: FAMILY MEDICINE CLINIC | Age: 62
End: 2022-11-15

## 2022-11-15 DIAGNOSIS — L40.9 PSORIASIS: ICD-10-CM

## 2022-11-15 RX ORDER — CLOBETASOL PROPIONATE 0.5 MG/G
CREAM TOPICAL DAILY
Qty: 60 G | Refills: 11 | OUTPATIENT
Start: 2022-11-15

## 2022-11-15 NOTE — TELEPHONE ENCOUNTER
Patient called in requesting medication refill.     clobetasol (TEMOVATE) 0.05 % cream   [[id number]]  Order Details  Ordered Dose: -- Route: Topical Frequency: DAILY   Admin Dose: --      Scheduled Start Date/Time: 01/07/22 0900 End Date/Time: 01/10/22 2050        [facility] [id number] Brennan Ellis, [address] [phone] [facility] [phone]   [address] [place] [address], [address] [place] [id number]   Phone:  [phone]  Fax:  [phone]    Thank you

## 2022-11-23 DIAGNOSIS — N40.1 BENIGN PROSTATIC HYPERPLASIA WITH POST-VOID DRIBBLING: ICD-10-CM

## 2022-11-23 DIAGNOSIS — N39.43 BENIGN PROSTATIC HYPERPLASIA WITH POST-VOID DRIBBLING: ICD-10-CM

## 2022-11-23 DIAGNOSIS — R35.0 URINARY FREQUENCY: ICD-10-CM

## 2022-11-23 RX ORDER — TAMSULOSIN HYDROCHLORIDE 0.4 MG/1
CAPSULE ORAL
Qty: 30 CAPSULE | Refills: 3 | Status: SHIPPED | OUTPATIENT
Start: 2022-11-23 | End: 2022-11-30 | Stop reason: SDUPTHER

## 2022-11-30 ENCOUNTER — OFFICE VISIT (OUTPATIENT)
Dept: FAMILY MEDICINE CLINIC | Age: 62
End: 2022-11-30
Payer: COMMERCIAL

## 2022-11-30 VITALS
HEART RATE: 83 BPM | BODY MASS INDEX: 36.21 KG/M2 | TEMPERATURE: 97.5 F | WEIGHT: 282 LBS | DIASTOLIC BLOOD PRESSURE: 76 MMHG | OXYGEN SATURATION: 96 % | SYSTOLIC BLOOD PRESSURE: 128 MMHG

## 2022-11-30 DIAGNOSIS — N39.43 BENIGN PROSTATIC HYPERPLASIA WITH POST-VOID DRIBBLING: ICD-10-CM

## 2022-11-30 DIAGNOSIS — Z12.11 SCREEN FOR COLON CANCER: ICD-10-CM

## 2022-11-30 DIAGNOSIS — Z13.1 SCREENING FOR DIABETES MELLITUS: ICD-10-CM

## 2022-11-30 DIAGNOSIS — Z00.00 PREVENTATIVE HEALTH CARE: Primary | ICD-10-CM

## 2022-11-30 DIAGNOSIS — R35.0 URINARY FREQUENCY: ICD-10-CM

## 2022-11-30 DIAGNOSIS — N40.1 BENIGN PROSTATIC HYPERPLASIA WITH POST-VOID DRIBBLING: ICD-10-CM

## 2022-11-30 PROCEDURE — 99396 PREV VISIT EST AGE 40-64: CPT | Performed by: NURSE PRACTITIONER

## 2022-11-30 RX ORDER — FINASTERIDE 5 MG/1
5 TABLET, FILM COATED ORAL DAILY
Qty: 90 TABLET | Refills: 4 | Status: SHIPPED | OUTPATIENT
Start: 2022-11-30

## 2022-11-30 RX ORDER — TAMSULOSIN HYDROCHLORIDE 0.4 MG/1
CAPSULE ORAL
Qty: 90 CAPSULE | Refills: 4 | Status: SHIPPED | OUTPATIENT
Start: 2022-11-30

## 2022-11-30 NOTE — PROGRESS NOTES
Silvio Nissen  : 1960  Encounter date: 2022    This alice 60 y.o. male who presents with  Chief Complaint   Patient presents with    Annual Exam       History of present illness:    HPI History and Physical      Silvio Nissen  YOB: 1960    Date of Service:  2022    Chief Complaint:   Silvio Nissen is a 60 y.o. male who  presents for physical examination. HPI: Pt is 60year old male for annual exam.  Gets routine labs for Dr. Agusto Vázquez, previous Nocardiosis Pneumonia. Due for cholesterol and diabetes check. No new concerns. Pt established with urology group for BPH. Wt Readings from Last 3 Encounters:   22 282 lb (127.9 kg)   22 271 lb (122.9 kg)   22 290 lb (131.5 kg)     BP Readings from Last 3 Encounters:   22 128/76   22 122/80   22 118/80       Patient Active Problem List   Diagnosis    Benign prostatic hyperplasia with post-void dribbling    Urinary frequency    Psoriasis    Pneumonia    Nocardiosis       No Known Allergies  Outpatient Medications Marked as Taking for the 22 encounter (Office Visit) with ERICA Adams NP   Medication Sig Dispense Refill    tamsulosin (FLOMAX) 0.4 MG capsule TAKE ONE CAPSULE BY MOUTH DAILY 90 capsule 4    finasteride (PROSCAR) 5 MG tablet Take 1 tablet by mouth daily 90 tablet 4    sulfamethoxazole-trimethoprim (BACTRIM DS;SEPTRA DS) 800-160 MG per tablet Take 1 tablet by mouth in the morning and 1 tablet at noon and 1 tablet in the evening.  90 tablet 3    clobetasol (TEMOVATE) 0.05 % cream Apply topically daily Apply topically once daily 60 g 11       Past Medical History:   Diagnosis Date    BPH (benign prostatic hyperplasia)     Psoriasis      Past Surgical History:   Procedure Laterality Date    COLONOSCOPY      CT NEEDLE BIOPSY LUNG PERCUTANEOUS  1/10/2022    CT NEEDLE BIOPSY LUNG PERCUTANEOUS 1/10/2022 [facility] CT SCAN    ESOPHAGUS SURGERY  2019    hernia    FOOT SURGERY  2019 bilateraly hammer toes    UPPER GASTROINTESTINAL ENDOSCOPY       Family History   Problem Relation Age of Onset    High Blood Pressure Mother     High Cholesterol Mother      Social History     Socioeconomic History    Marital status:      Spouse name: Not on file    Number of children: Not on file    Years of education: Not on file    Highest education level: Not on file   Occupational History    Not on file   Tobacco Use    Smoking status: Never    Smokeless tobacco: Never   Substance and Sexual Activity    Alcohol use: Yes     Comment: occasionally    Drug use: Never    Sexual activity: Not on file   Other Topics Concern    Not on file   Social History Narrative    Not on file     Social Determinants of Health     Financial Resource Strain: Low Risk     Difficulty of Paying Living Expenses: Not hard at all   Food Insecurity: No Food Insecurity    Worried About Running Out of Food in the Last Year: Never true    Ran Out of Food in the Last Year: Never true   Transportation Needs: Not on file   Physical Activity: Not on file   Stress: Not on file   Social Connections: Not on file   Intimate Partner Violence: Not on file   Housing Stability: Not on file       Self-testicular exams: Yes  Sexual activity: single partner, contraception - none   Last eye exam: NA, recommended  Exercise: no regular exercise    Review of Systems:  A comprehensive review of systems was negative except for what was noted in the HPI. Physical Exam:   Vitals:    11/30/22 1022   BP: 128/76   Site: Left Upper Arm   Position: Sitting   Cuff Size: Medium Adult   Pulse: 83   Temp: 97.5 °F (36.4 °C)   TempSrc: Infrared   SpO2: 96%   Weight: 282 lb (127.9 kg)     Body mass index is 36.21 kg/m². Constitutional: He is oriented to person, place, and time. He appears well-developed and well-nourished. No distress. HEENT:   Head: Normocephalic and atraumatic.    Right Ear: Tympanic membrane, external ear and ear canal normal.   Left Ear: Tympanic membrane, external ear and ear canal normal.   Mouth/Throat: Oropharynx is clear and moist and mucous membranes are normal. No oropharyngeal exudate or posterior oropharyngeal erythema. He has no cervical adenopathy. Eyes: Conjunctivae and extraocular motions are normal. Pupils are equal, round, and reactive to light. Neck:  Supple. No JVD present. Carotid bruit is not present. No mass and no thyromegaly present. Cardiovascular: Normal rate, regular rhythm, normal heart sounds and intact distal pulses. Exam reveals no gallop and no friction rub. No murmur heard. Pulmonary/Chest: Effort normal and breath sounds normal. No respiratory distress. He has no wheezes, rhonchi or rales. Abdominal: Soft, non-tender. Bowel sounds and aorta are normal. There is no organomegaly, mass or bruit. Genitourinary:  rectal not indicated by age criteria and lack of symptoms. Musculoskeletal: Normal range of motion, no synovitis. He exhibits no edema. Neurological: He is alert and oriented to person, place, and time. He has normal reflexes. No cranial nerve deficit. Coordination normal.   Skin: Skin is warm, dry and intact. No suspicious lesions are noted. Psychiatric: He has a normal mood and affect.  His speech is normal and behavior is normal. Judgment, cognition and memory are normal.     Preventive Care:  Health Maintenance   Topic Date Due    COVID-19 Vaccine (1) Never done    Hepatitis C screen  Never done    Diabetes screen  Never done    Lipids  Never done    Colorectal Cancer Screen  Never done    Shingles vaccine (1 of 2) Never done    Flu vaccine (1) Never done    Depression Screen  06/28/2023    DTaP/Tdap/Td vaccine (4 - Td or Tdap) 06/28/2032    HIV screen  Completed    Hepatitis A vaccine  Aged Out    Hib vaccine  Aged Out    Meningococcal (ACWY) vaccine  Aged Out    Pneumococcal 0-64 years Vaccine  Aged [employer] plan of care for Gaynelle Kocher        11/30/2022 Preventive Measures Status       Recommendations for screening   Prostate Cancer Screen  Sees urology yearly   Test recommended and ordered    Colon Cancer Screen  Last colonoscopy: NA Test recommended and ordered   Diabetes Screen  Glucose (mg/dL)   Date Value   10/10/2022 106 (H)    Test recommended and ordered   Cholesterol Screen  No results found for: CHOL, TRIG, HDL, LDLCALC, LDLDIRECT Test recommended and ordered   Aspirin for Cardiovascular Prevention   No Not indicated   Weight: Body mass index is 36.21 kg/m². 282 lb (127.9 kg)  Your BMI is 25 or greater, which indicates that you are overweight   Living Will: No recommended    Recommended Immunizations    Immunization History   Administered Date(s) Administered    Tdap (Boostrix, Adacel) 09/30/2015, 06/25/2019, 06/28/2022    Influenza vaccine:  recommended every fall         Other Recommendations   Follow up in this office every 12 months for re-evaluation of your chronic medical issues             Assessment/Plan:  Vic Meek was seen today for annual exam.    Diagnoses and all orders for this visit:    Preventative health care  -     Lipid, Fasting; Future    Screening for diabetes mellitus  -     Hemoglobin A1C; Future    Screen for colon cancer  -     Fecal DNA Colorectal cancer screening (Cologuard)    Benign prostatic hyperplasia with post-void dribbling  -     tamsulosin (FLOMAX) 0.4 MG capsule; TAKE ONE CAPSULE BY MOUTH DAILY  -     finasteride (PROSCAR) 5 MG tablet; Take 1 tablet by mouth daily    Urinary frequency  -     tamsulosin (FLOMAX) 0.4 MG capsule; TAKE ONE CAPSULE BY MOUTH DAILY  -     finasteride (PROSCAR) 5 MG tablet; Take 1 tablet by mouth daily          Current Outpatient Medications on File Prior to Visit   Medication Sig Dispense Refill    sulfamethoxazole-trimethoprim (BACTRIM DS;SEPTRA DS) 800-160 MG per tablet Take 1 tablet by mouth in the morning and 1 tablet at noon and 1 tablet in the evening.  90 tablet 3    clobetasol (TEMOVATE) 0.05 % cream Apply topically daily Apply topically once daily 60 g 11     No current facility-administered medications on file prior to visit. No Known Allergies  Past Medical History:   Diagnosis Date    BPH (benign prostatic hyperplasia)     Psoriasis       Past Surgical History:   Procedure Laterality Date    COLONOSCOPY      CT NEEDLE BIOPSY LUNG PERCUTANEOUS  1/10/2022    CT NEEDLE BIOPSY LUNG PERCUTANEOUS 1/10/2022 [facility] CT SCAN    ESOPHAGUS SURGERY  2019    hernia    FOOT SURGERY  2019    bilateraly hammer toes    UPPER GASTROINTESTINAL ENDOSCOPY        Family History   Problem Relation Age of Onset    High Blood Pressure Mother     High Cholesterol Mother       Social History     Tobacco Use    Smoking status: Never    Smokeless tobacco: Never   Substance Use Topics    Alcohol use: Yes     Comment: occasionally        Review of Systems    Objective:    /76 (Site: Left Upper Arm, Position: Sitting, Cuff Size: Medium Adult)   Pulse 83   Temp 97.5 °F (36.4 °C) (Infrared)   Wt 282 lb (127.9 kg)   SpO2 96%   BMI 36.21 kg/m²   Weight: 282 lb (127.9 kg)     BP Readings from Last 3 Encounters:   11/30/22 128/76   08/22/22 122/80   04/22/22 118/80     Wt Readings from Last 3 Encounters:   11/30/22 282 lb (127.9 kg)   08/22/22 271 lb (122.9 kg)   04/22/22 290 lb (131.5 kg)     BMI Readings from Last 3 Encounters:   11/30/22 36.21 kg/m²   08/22/22 34.79 kg/m²   04/22/22 37.23 kg/m²       Physical Exam    Assessment/Plan    1. Preventative health care  Advised routine dental and vision  Increased exercise, healthy diet and weight loss  Advised living will  - Lipid, Fasting; Future    2. Screening for diabetes mellitus  - Hemoglobin A1C; Future    3. Screen for colon cancer  - Fecal DNA Colorectal cancer screening (Cologuard)    4.  Benign prostatic hyperplasia with post-void dribbling  Controlled  Continue follow up urology  - tamsulosin (FLOMAX) 0.4 MG capsule; TAKE ONE CAPSULE BY MOUTH DAILY  Dispense: 90 capsule; Refill: 4  - finasteride (PROSCAR) 5 MG tablet; Take 1 tablet by mouth daily  Dispense: 90 tablet; Refill: 4    5. Urinary frequency  controlled  - tamsulosin (FLOMAX) 0.4 MG capsule; TAKE ONE CAPSULE BY MOUTH DAILY  Dispense: 90 capsule; Refill: 4  - finasteride (PROSCAR) 5 MG tablet; Take 1 tablet by mouth daily  Dispense: 90 tablet; Refill: 4    Return in about 1 year (around 11/30/2023) for annual check up. This dictation was generated by voice recognition computer software. Although all attempts are made to edit the dictation for accuracy, there may be errors in the transcription that are not intended.

## 2022-12-06 ENCOUNTER — HOSPITAL ENCOUNTER (OUTPATIENT)
Age: 62
Discharge: HOME | End: 2022-12-06
Payer: COMMERCIAL

## 2022-12-06 DIAGNOSIS — Z00.00 PREVENTATIVE HEALTH CARE: ICD-10-CM

## 2022-12-06 DIAGNOSIS — A43.0 PULMONARY NOCARDIOSIS (HCC): ICD-10-CM

## 2022-12-06 DIAGNOSIS — Z13.1 SCREENING FOR DIABETES MELLITUS: ICD-10-CM

## 2022-12-06 LAB
A/G RATIO: 1.6 (ref 1.1–2.2)
ALBUMIN SERPL-MCNC: 4.4 G/DL (ref 3.4–5)
ALP BLD-CCNC: 54 U/L (ref 40–129)
ALT SERPL-CCNC: 33 U/L (ref 10–40)
ANION GAP SERPL CALCULATED.3IONS-SCNC: 13 MMOL/L (ref 3–16)
AST SERPL-CCNC: 19 U/L (ref 15–37)
BASOPHILS ABSOLUTE: 0.1 K/UL (ref 0–0.2)
BASOPHILS RELATIVE PERCENT: 1 %
BILIRUB SERPL-MCNC: 0.7 MG/DL (ref 0–1)
BUN BLDV-MCNC: 17 MG/DL (ref 7–20)
CALCIUM SERPL-MCNC: 9.9 MG/DL (ref 8.3–10.6)
CHLORIDE BLD-SCNC: 102 MMOL/L (ref 99–110)
CHOLESTEROL, TOTAL: 201 MG/DL (ref 0–199)
CO2: 27 MMOL/L (ref 21–32)
CREAT SERPL-MCNC: 0.9 MG/DL (ref 0.8–1.3)
EOSINOPHILS ABSOLUTE: 0 K/UL (ref 0–0.6)
EOSINOPHILS RELATIVE PERCENT: 0 %
ESTIMATED AVERAGE GLUCOSE: 122.6 MG/DL
GFR SERPL CREATININE-BSD FRML MDRD: >60 ML/MIN/{1.73_M2}
GLUCOSE BLD-MCNC: 106 MG/DL (ref 70–99)
HBA1C MFR BLD: 5.9 %
HCT VFR BLD CALC: 53 % (ref 40.5–52.5)
HDLC SERPL-MCNC: 36 MG/DL (ref 40–60)
HEMOGLOBIN: 17.2 G/DL (ref 13.5–17.5)
LDL CHOLESTEROL CALCULATED: 137 MG/DL
LYMPHOCYTES ABSOLUTE: 2.6 K/UL (ref 1–5.1)
LYMPHOCYTES RELATIVE PERCENT: 43.9 %
MCH RBC QN AUTO: 30.2 PG (ref 26–34)
MCHC RBC AUTO-ENTMCNC: 32.5 G/DL (ref 31–36)
MCV RBC AUTO: 93 FL (ref 80–100)
MONOCYTES ABSOLUTE: 0.8 K/UL (ref 0–1.3)
MONOCYTES RELATIVE PERCENT: 13.9 %
NEUTROPHILS ABSOLUTE: 2.4 K/UL (ref 1.7–7.7)
NEUTROPHILS RELATIVE PERCENT: 41.2 %
PDW BLD-RTO: 14.3 % (ref 12.4–15.4)
PLATELET # BLD: 203 K/UL (ref 135–450)
PMV BLD AUTO: 8.1 FL (ref 5–10.5)
POTASSIUM SERPL-SCNC: 5.2 MMOL/L (ref 3.5–5.1)
RBC # BLD: 5.7 M/UL (ref 4.2–5.9)
SODIUM BLD-SCNC: 142 MMOL/L (ref 136–145)
TOTAL PROTEIN: 7.2 G/DL (ref 6.4–8.2)
TRIGL SERPL-MCNC: 141 MG/DL (ref 0–150)
VLDLC SERPL CALC-MCNC: 28 MG/DL
WBC # BLD: 5.9 K/UL (ref 4–11)

## 2022-12-06 PROCEDURE — 83036 HEMOGLOBIN GLYCOSYLATED A1C: CPT

## 2022-12-06 PROCEDURE — 85025 COMPLETE CBC W/AUTO DIFF WBC: CPT

## 2022-12-06 PROCEDURE — 80061 LIPID PANEL: CPT

## 2022-12-06 PROCEDURE — 80053 COMPREHEN METABOLIC PANEL: CPT

## 2022-12-06 PROCEDURE — 36415 COLL VENOUS BLD VENIPUNCTURE: CPT

## 2022-12-07 ENCOUNTER — TELEPHONE (OUTPATIENT)
Dept: FAMILY MEDICINE CLINIC | Age: 62
End: 2022-12-07

## 2022-12-07 DIAGNOSIS — E78.2 MIXED HYPERLIPIDEMIA: Primary | ICD-10-CM

## 2022-12-07 DIAGNOSIS — L40.9 PSORIASIS: ICD-10-CM

## 2022-12-07 RX ORDER — CLOBETASOL PROPIONATE 0.5 MG/G
CREAM TOPICAL DAILY
Qty: 60 G | Refills: 11 | Status: SHIPPED | OUTPATIENT
Start: 2022-12-07

## 2022-12-07 NOTE — TELEPHONE ENCOUNTER
clobetasol (TEMOVATE) 0.05 % cream [[id number]]     Order Details  Dose: -- Route: Topical Frequency: DAILY   Dispense Quantity: 60 g Refills: 11          Sig: Apply topically daily Apply topically once daily   [facility] [id number] [place], [place] - [address], [address] [place] [address]   Phone:  [phone]  Fax:  [phone]

## 2023-01-13 ENCOUNTER — HOSPITAL ENCOUNTER (OUTPATIENT)
Age: 63
Discharge: HOME | End: 2023-01-13
Payer: COMMERCIAL

## 2023-01-13 DIAGNOSIS — A43.0 PULMONARY NOCARDIOSIS (HCC): ICD-10-CM

## 2023-01-13 LAB
A/G RATIO: 1.3 (ref 1.1–2.2)
ALBUMIN SERPL-MCNC: 4.1 G/DL (ref 3.4–5)
ALP BLD-CCNC: 55 U/L (ref 40–129)
ALT SERPL-CCNC: 45 U/L (ref 10–40)
ANION GAP SERPL CALCULATED.3IONS-SCNC: 10 MMOL/L (ref 3–16)
AST SERPL-CCNC: 26 U/L (ref 15–37)
BASOPHILS ABSOLUTE: 0.1 K/UL (ref 0–0.2)
BASOPHILS RELATIVE PERCENT: 1.1 %
BILIRUB SERPL-MCNC: 0.6 MG/DL (ref 0–1)
BUN BLDV-MCNC: 15 MG/DL (ref 7–20)
CALCIUM SERPL-MCNC: 9.4 MG/DL (ref 8.3–10.6)
CHLORIDE BLD-SCNC: 102 MMOL/L (ref 99–110)
CO2: 27 MMOL/L (ref 21–32)
CREAT SERPL-MCNC: 0.9 MG/DL (ref 0.8–1.3)
EOSINOPHILS ABSOLUTE: 0.8 K/UL (ref 0–0.6)
EOSINOPHILS RELATIVE PERCENT: 11.6 %
GFR SERPL CREATININE-BSD FRML MDRD: >60 ML/MIN/{1.73_M2}
GLUCOSE BLD-MCNC: 94 MG/DL (ref 70–99)
HCT VFR BLD CALC: 51.2 % (ref 40.5–52.5)
HEMOGLOBIN: 17.1 G/DL (ref 13.5–17.5)
LYMPHOCYTES ABSOLUTE: 2.5 K/UL (ref 1–5.1)
LYMPHOCYTES RELATIVE PERCENT: 38.9 %
MCH RBC QN AUTO: 30.9 PG (ref 26–34)
MCHC RBC AUTO-ENTMCNC: 33.4 G/DL (ref 31–36)
MCV RBC AUTO: 92.5 FL (ref 80–100)
MONOCYTES ABSOLUTE: 0.9 K/UL (ref 0–1.3)
MONOCYTES RELATIVE PERCENT: 13.7 %
NEUTROPHILS ABSOLUTE: 2.2 K/UL (ref 1.7–7.7)
NEUTROPHILS RELATIVE PERCENT: 34.7 %
PDW BLD-RTO: 14.2 % (ref 12.4–15.4)
PLATELET # BLD: 184 K/UL (ref 135–450)
PMV BLD AUTO: 8.3 FL (ref 5–10.5)
POTASSIUM SERPL-SCNC: 4.2 MMOL/L (ref 3.5–5.1)
RBC # BLD: 5.54 M/UL (ref 4.2–5.9)
SODIUM BLD-SCNC: 139 MMOL/L (ref 136–145)
TOTAL PROTEIN: 7.2 G/DL (ref 6.4–8.2)
WBC # BLD: 6.5 K/UL (ref 4–11)

## 2023-01-13 PROCEDURE — 85025 COMPLETE CBC W/AUTO DIFF WBC: CPT

## 2023-01-13 PROCEDURE — 80053 COMPREHEN METABOLIC PANEL: CPT

## 2023-01-13 PROCEDURE — 36415 COLL VENOUS BLD VENIPUNCTURE: CPT

## 2023-01-30 DIAGNOSIS — L40.9 PSORIASIS: ICD-10-CM

## 2023-01-30 RX ORDER — CLOBETASOL PROPIONATE 0.5 MG/G
CREAM TOPICAL DAILY
Qty: 60 G | Refills: 11 | Status: SHIPPED | OUTPATIENT
Start: 2023-01-30

## 2023-02-01 ENCOUNTER — TELEMEDICINE (OUTPATIENT)
Dept: INFECTIOUS DISEASES | Age: 63
End: 2023-02-01
Payer: COMMERCIAL

## 2023-02-01 DIAGNOSIS — E66.09 CLASS 2 OBESITY DUE TO EXCESS CALORIES WITH BODY MASS INDEX (BMI) OF 35.0 TO 35.9 IN ADULT, UNSPECIFIED WHETHER SERIOUS COMORBIDITY PRESENT: ICD-10-CM

## 2023-02-01 DIAGNOSIS — Z51.81 THERAPEUTIC DRUG MONITORING: ICD-10-CM

## 2023-02-01 DIAGNOSIS — A43.0 PULMONARY NOCARDIOSIS (HCC): Primary | ICD-10-CM

## 2023-02-01 DIAGNOSIS — Z86.16 HISTORY OF 2019 NOVEL CORONAVIRUS DISEASE (COVID-19): ICD-10-CM

## 2023-02-01 DIAGNOSIS — N39.43 BENIGN PROSTATIC HYPERPLASIA WITH POST-VOID DRIBBLING: ICD-10-CM

## 2023-02-01 DIAGNOSIS — Z71.89 ENCOUNTER FOR MEDICATION COUNSELING: ICD-10-CM

## 2023-02-01 DIAGNOSIS — Z71.3 WEIGHT LOSS COUNSELING, ENCOUNTER FOR: ICD-10-CM

## 2023-02-01 DIAGNOSIS — N40.1 BENIGN PROSTATIC HYPERPLASIA WITH POST-VOID DRIBBLING: ICD-10-CM

## 2023-02-01 DIAGNOSIS — Z23 NEED FOR PNEUMOCOCCAL VACCINATION: ICD-10-CM

## 2023-02-01 DIAGNOSIS — L40.9 PSORIASIS: ICD-10-CM

## 2023-02-01 PROCEDURE — 99214 OFFICE O/P EST MOD 30 MIN: CPT | Performed by: INTERNAL MEDICINE

## 2023-02-01 ASSESSMENT — ENCOUNTER SYMPTOMS
NAUSEA: 0
SORE THROAT: 0
RHINORRHEA: 0
WHEEZING: 0
SHORTNESS OF BREATH: 0
DIARRHEA: 0
EYE DISCHARGE: 0
BACK PAIN: 0
ABDOMINAL PAIN: 0
COUGH: 0
SINUS PAIN: 0
SINUS PRESSURE: 0
CONSTIPATION: 0
EYE REDNESS: 0

## 2023-02-01 NOTE — PROGRESS NOTES
Infectious Diseases Oupatient Follow-up Note            Reason for Visit:               Follow-up for pulmonary nocardiosis  Primary Care Physician:  ERICA Gao NP  History Obtained From:   Patient , Medical Records     CHIEF COMPLAINT:    Chief Complaint   Patient presents with    Follow-up     pulmonary nocardiosis       INTERVAL HISTORY: Hesham Yeh is a 60 y.o. pleasant male patient, who had an outpatient visit with me today for follow-up. History was obtained from chart review and the patient. The patient had a virtual clinic visit with me today for above mentioned complaints. The patient was diagnosed with pulmonary nocardiosis after CT-guided biopsy of the right upper lobe lung mass on January 10, 2022. The tissue culture grew Nocardia abscessus. The patient was started on IV ceftriaxone, minocycline and oral Bactrim DS by me on February 1, 2022. MRI of the brain showed 7 mm ring-enhancing lesion in the right temporal lobe concerning for CNS nocardiosis and repeat MRI done 2 months later on 4/18/2022 showed resolution of that lesion. I stopped his IV ceftriaxone on 4/18/2022. The patient started having leukopenia issues. I reduced his oral Bactrim dose from 2 tablet every 8 hours to 2 tablet every 12 hours 5/19/2022 and further reduce the dose to 1 tablet every 8 hours and stop the oral minocycline on 7/14/2022. The patient had a last video visit with me on October 26, 2022. I continued oral Bactrim DS 1 tablet every 8 hours and patient had another video visit with me today. Past Medical History:   Past medical and surgical history was reviewed by me during this visit in detail.     Past Medical History:   Diagnosis Date    BPH (benign prostatic hyperplasia)     Psoriasis        Past Surgical History:    Past Surgical History:   Procedure Laterality Date    COLONOSCOPY      CT NEEDLE BIOPSY LUNG PERCUTANEOUS  1/10/2022    CT NEEDLE BIOPSY LUNG PERCUTANEOUS 1/10/2022 [facility] CT SCAN    ESOPHAGUS SURGERY  2019    hernia    FOOT SURGERY  2019    bilateraly hammer toes    UPPER GASTROINTESTINAL ENDOSCOPY         Current Medications: All medications were reviewed by me during this visit  Current Outpatient Medications   Medication Sig Dispense Refill    clobetasol (TEMOVATE) 0.05 % cream Apply topically daily Apply topically once daily 60 g 11    tamsulosin (FLOMAX) 0.4 MG capsule TAKE ONE CAPSULE BY MOUTH DAILY 90 capsule 4    sulfamethoxazole-trimethoprim (BACTRIM DS;SEPTRA DS) 800-160 MG per tablet Take 1 tablet by mouth in the morning and 1 tablet at noon and 1 tablet in the evening. 90 tablet 3    finasteride (PROSCAR) 5 MG tablet Take 1 tablet by mouth daily (Patient not taking: Reported on 2/1/2023) 90 tablet 4     No current facility-administered medications for this visit. Family history: All family history was reviewed by me today    Family History   Problem Relation Age of Onset    High Blood Pressure Mother     High Cholesterol Mother        No family history of immunocompromising or autoimmune conditions. No h/o TB in in family or contacts    REVIEW OF SYSTEMS:      Review of Systems   Constitutional:  Negative for chills, diaphoresis and fever. HENT:  Negative for ear discharge, ear pain, postnasal drip, rhinorrhea, sinus pressure, sinus pain and sore throat. Eyes:  Negative for discharge and redness. Respiratory:  Negative for cough, shortness of breath and wheezing. Cardiovascular:  Negative for chest pain and leg swelling. Gastrointestinal:  Negative for abdominal pain, constipation, diarrhea and nausea. Endocrine: Negative for cold intolerance, heat intolerance and polydipsia. Genitourinary:  Negative for dysuria, flank pain, frequency, hematuria and urgency. Musculoskeletal:  Negative for back pain and myalgias. Skin:  Negative for rash. Allergic/Immunologic: Negative for immunocompromised state.    Neurological:  Negative for dizziness, seizures and headaches. Hematological:  Does not bruise/bleed easily. Psychiatric/Behavioral:  Negative for agitation, hallucinations and suicidal ideas. The patient is not nervous/anxious. All other systems reviewed and are negative. PHYSICAL EXAM:      There were no vitals filed for this visit. Physical Exam  Constitutional:       General: He is not in acute distress. Appearance: Normal appearance. He is not ill-appearing or toxic-appearing. Comments: The patient was seen today via virtual video visit   HENT:      Head: Normocephalic. Right Ear: External ear normal.      Left Ear: External ear normal.      Nose: Nose normal.      Mouth/Throat:      Pharynx: No oropharyngeal exudate. Eyes:      General: No scleral icterus. Right eye: No discharge. Left eye: No discharge. Extraocular Movements: Extraocular movements intact. Pulmonary:      Effort: Pulmonary effort is normal.   Musculoskeletal:         General: No swelling. Normal range of motion. Cervical back: Normal range of motion. Skin:     Coloration: Skin is not jaundiced. Findings: No bruising or erythema. Neurological:      Mental Status: He is alert and oriented to person, place, and time. Mental status is at baseline. Gait: Gait normal.   Psychiatric:         Mood and Affect: Mood normal.         Behavior: Behavior normal.         Thought Content:  Thought content normal.         Judgment: Judgment normal.            DATA:  All available lab data wasreviewed by me during this visit    CBC:  Lab Results   Component Value Date    WBC 6.5 01/13/2023    HGB 17.1 01/13/2023    HCT 51.2 01/13/2023    MCV 92.5 01/13/2023     01/13/2023    LYMPHOPCT 38.9 01/13/2023    RBC 5.54 01/13/2023    MCH 30.9 01/13/2023    MCHC 33.4 01/13/2023    RDW 14.2 01/13/2023       Last BMP:  Lab Results   Component Value Date/Time     01/13/2023 10:28 AM    K 4.2 01/13/2023 10:28 AM K 3.6 01/10/2022 04:18 AM     01/13/2023 10:28 AM    CO2 27 01/13/2023 10:28 AM    BUN 15 01/13/2023 10:28 AM    CREATININE 0.9 01/13/2023 10:28 AM    GLUCOSE 94 01/13/2023 10:28 AM    CALCIUM 9.4 01/13/2023 10:28 AM        Hepatic Function Panel:  Lab Results   Component Value Date/Time    ALKPHOS 55 01/13/2023 10:28 AM    ALT 45 01/13/2023 10:28 AM    AST 26 01/13/2023 10:28 AM    PROT 7.2 01/13/2023 10:28 AM    BILITOT 0.6 01/13/2023 10:28 AM    LABALBU 4.1 01/13/2023 10:28 AM       Last CK:  Lab Results   Component Value Date    CKTOTAL 91 06/08/2022       Last ESR:  Lab Results   Component Value Date    SEDRATE 6 05/12/2022       Last CRP:  Lab Results   Component Value Date    CRP <3.0 05/12/2022         Imaging: All pertinent images and reports for the current visit were reviewed by me during this visit. I reviewed the chest x-ray/CT scan/MRI images and independently interpreted the findings and results today. Outside records:    Labs, Microbiology,Radiology and pertinent results from Care everywhere, if available, were reviewed as a part of the consultation. Problem list:       Patient Active Problem List   Diagnosis Code    Benign prostatic hyperplasia with post-void dribbling N40.1, N39.43    Urinary frequency R35.0    Psoriasis L40.9    Pneumonia J18.9    Nocardiosis A43.9       Microbiology:       All available micro data was reviewed by me during this visit    Allergies and immunizations:       Known drug Allergies: All allergies data was reviewed by me during this visit  Patient has no known allergies. Immunizations: All immunizations were reviewed byme in detail. Immunization History   Administered Date(s) Administered    Tdap (Boostrix, Adacel) 09/30/2015, 06/25/2019, 06/28/2022       SocialHistory:  All social and epidemiologic history was reviewed and updated be me in detail today.     Social History     Socioeconomic History    Marital status:    Tobacco Use Smoking status: Never    Smokeless tobacco: Never   Substance and Sexual Activity    Alcohol use: Yes     Comment: occasionally    Drug use: Never       COVID VACCINATION AND LAB RESULT RECORDS:     Internal Administration   First Dose      Second Dose           Last COVID Lab SARS-CoV-2 (no units)   Date Value   12/14/2021 Not Detected     SARS-CoV-2, NAAT (no units)   Date Value   01/10/2022 DETECTED (AA)            IMPRESSION:    The patient is a 60 y.o. old male who  has a past medical history of BPH (benign prostatic hyperplasia) and Psoriasis. was seen today for following problems:    1. Pulmonary nocardiosis ([address]    2. Encounter for medication counseling    3. Benign prostatic hyperplasia with post-void dribbling    4. Therapeutic drug monitoring    5. History of 2019 novel coronavirus disease (COVID-19)    6. Psoriasis    7. Class 2 obesity due to excess calories with body mass index (BMI) of 35.0 to 35.9 in adult, unspecified whether serious comorbidity present    8. Weight loss counseling, encounter for        Discussion:      The patient has been on oral Bactrim for about 1 year since February 2022 for pulmonary nocardiosis. He also received 2 months of CNS dose of IV ceftriaxone and received oral minocycline for about 5 months. I have been following his mother last.  His last set of labs was done on 1/13/2023. His serum creatinine was 0.9 at that time. His ALT was slightly elevated at 45. The rest of the liver enzymes were okay. His CBC was okay with a white cell count of 6500, hemoglobin of 17.1 and platelet count of 259,399. His last CT scan of the chest was done on 10/18/2022. I reviewed the CT images and independently interpreted the CT findings. It had shown a small residual right lower lobe lung nodules measuring 3 to 4 mm and no new lung nodules. RECOMMENDATIONS:      Diagnostic Workup:     Will order CT scan of the chest without contrast to follow-up on the lung nodules from pulmonary nocardiosis  Continue to follow fever curve  at home      Antimicrobials: The patient is only had about 1 year of completion of treatment for pulmonary nocardiosis  Recommended treatment duration for nocardia is around 1 year  We will see how the repeat CT scan of the chest looks  If the CT scan of the chest looks okay and does not show any new lesions, will consider stopping his oral Bactrim as well  The patient has around 10 days of oral Bactrim with him. I advised him to continue that for now  He was advised to call my office 1 to 2 days after he gets the CT scan done. After reviewing that we will make a final decision about termination of Nocardia treatment  The patient is a non-smoker. However, he is not up-to-date on his pneumonia vaccinations  I advised him to follow-up with his PCP and get his pneumonia vaccinations. Will advise him to get the Prevnar 20 vaccine first followed by Pneumovax 23 vaccine 1 year later. Discussed again the importance of staying up-to-date on COVID-19 vaccinations and seasonal influenza vaccinations  Discussed with patient the strategies to stay safe from [place] 19 exposures including safe social distancing, frequent and proper hand hygiene when outside and using 3 layered mouth and nose coverings/facemasks when outside their home. Labs ordered today in EPIC:    Orders Placed This Encounter   Procedures    CT CHEST WO CONTRAST     Standing Status:   Future     Standing Expiration Date:   2/1/2024     Order Specific Question:   Reason for exam:     Answer:   f/u on lung nodules from pulomanry nocardiosis     Weight loss counseling:    Extensive weight loss counseling was done. It is important to set a realistic weight loss goal. First goal should be to avoid gaining more weight and staying at current weight (or within 5 percent).  People at high risk of developing diabetes who are able to lose 5 percent of their body weight and maintain this weight will reduce their risk of developing diabetes by about 50 percent and reduce their blood pressure. Losing more than 15 percent of  body weight and staying at this weight is an extremely good result, even if you never reach your \"dream\" or \"ideal\" weight. Lifestyle changes including changing eating habits, substituting excess carbohydrates with proteins, stress reduction, using self-help programs like Weight Watchers®, Overeaters Anonymous®, and Take Off Pounds Sensibly (TOPS)© , following DASH diet and increasing exercise or walking briskly daily for half hour to and hour 5-7 days a week was suggested among other measures. Information was given about various weight loss education programs and their websites like www.cdc.gov/healthyweight, www.choosemyplate.gov and www.health.gov/dietaryguidelines/      Patient education and counseling: The patient was educated in detail about the side-effects of various antibiotics and things to watch for like new rashes, lip swelling, severereaction, worsening diarrhea, break through fever etc.  Patient was instructed to stop antibiotics and call our office if these problems were to occur, or go to nearest ER ifexperiencing severe symptoms. Discussed patient's condition and what to expect. All of the patient's questions were addressed in a satisfactory manner and patient verbalizedunderstanding all instructions. Follow-up:    Follow-up with me in ID clinic or through video visit as needed    Level of complexity of visit and medical decision making: Porsha Chapa is a 60 y.o. male being evaluated by a Virtual Visit encounter to address concerns as mentioned above. A caregiver was present when appropriate. Due to this being a TeleHealth encounter (During KQZVD-72 public health emergency), evaluation of the following organ systems was limited: Vitals/Constitutional/EENT/Resp/CV/GI//MS/Neuro/Skin/Heme-Lymph-Imm.   Pursuant to the emergency declaration under the [address] and the [place], [address] [place] [address] waiver authority and the Coronavirus Preparedness and [employer] Act, this Virtual Visit was conducted with patient's (and/or legal guardian's) consent, to reduce the patient's risk of exposure to COVID-19 and provide necessary medical care. The patient (and/or legal guardian) has also been advised to contact this office for worsening conditions or problems, and seek emergency medical treatment and/or call 911 if deemed necessary. Services were provided through an audio and/or video synchronous discussion virtually to substitute for in-person clinic visit. Patient and provider were located at their individual homes. --Wyvonna Dubin, MD on 2/1/23  at 9:32 AM EST          An electronic signature was used to authenticate this note. Please note that this chart was generated using Dragon dictation software. Although every effort was made to ensure the accuracy of this automated transcription, some errors in transcription may have occurred inadvertently. If you may need any clarification, please do not hesitate to contact me through EPIC or at the phone number provided below with my electronic signature. Any pictures or media included in this note were obtained after taking informed verbal consent from the patient and with their approval to include those in the patient's medical record. Thankyou for involving me inthe care of your patient. If you have any additional questions, please do not hesitate to contact me. Wyvonna Dubin, MD, MPH, FACP, [facility]  2/1/23 , 9:32 AM EST   [facility] Infectious Disease   [address] [place] [address] [address] [facility], [address] [place] [address]  Office: [phone]  Fax: [phone]  In-person Clinic days:  Tuesday & Thursday a.m. Virtual clinic days: Monday, Wednesday & Friday a.m.

## 2023-02-08 ENCOUNTER — TELEPHONE (OUTPATIENT)
Dept: FAMILY MEDICINE CLINIC | Age: 63
End: 2023-02-08

## 2023-02-08 DIAGNOSIS — L40.9 PSORIASIS: ICD-10-CM

## 2023-02-08 RX ORDER — CLOBETASOL PROPIONATE 0.5 MG/G
CREAM TOPICAL DAILY
Qty: 60 G | Refills: 11 | Status: SHIPPED | OUTPATIENT
Start: 2023-02-08

## 2023-02-08 NOTE — TELEPHONE ENCOUNTER
clobetasol (TEMOVATE) 0.05 % cream [[id number]]     Order Details  Dose: -- Route: Topical Frequency: DAILY   Dispense Quantity: 60 g Refills: 11          Sig: Apply topically daily Apply topically once daily   [facility] [address] [place] [address] - [address] - P [phone] - F [phone]

## 2023-02-14 ENCOUNTER — HOSPITAL ENCOUNTER (OUTPATIENT)
Dept: CT IMAGING | Age: 63
Discharge: HOME | End: 2023-02-14
Payer: COMMERCIAL

## 2023-02-14 DIAGNOSIS — A43.0 PULMONARY NOCARDIOSIS (HCC): ICD-10-CM

## 2023-02-14 PROCEDURE — 71250 CT THORAX DX C-: CPT

## 2023-05-18 ENCOUNTER — HOSPITAL ENCOUNTER (OUTPATIENT)
Age: 63
Discharge: HOME | End: 2023-05-18
Payer: COMMERCIAL

## 2023-05-18 ENCOUNTER — OFFICE VISIT (OUTPATIENT)
Dept: FAMILY MEDICINE CLINIC | Age: 63
End: 2023-05-18
Payer: COMMERCIAL

## 2023-05-18 VITALS
SYSTOLIC BLOOD PRESSURE: 124 MMHG | DIASTOLIC BLOOD PRESSURE: 76 MMHG | OXYGEN SATURATION: 96 % | HEIGHT: 74 IN | TEMPERATURE: 97.5 F | WEIGHT: 287 LBS | BODY MASS INDEX: 36.83 KG/M2 | HEART RATE: 79 BPM

## 2023-05-18 DIAGNOSIS — F41.9 ANXIETY: Primary | ICD-10-CM

## 2023-05-18 DIAGNOSIS — E78.2 MIXED HYPERLIPIDEMIA: ICD-10-CM

## 2023-05-18 LAB
CHOLEST SERPL-MCNC: 179 MG/DL (ref 0–199)
HDLC SERPL-MCNC: 37 MG/DL (ref 40–60)
LDL CHOLESTEROL CALCULATED: 119 MG/DL
TRIGL SERPL-MCNC: 117 MG/DL (ref 0–150)
VLDLC SERPL CALC-MCNC: 23 MG/DL

## 2023-05-18 PROCEDURE — 36415 COLL VENOUS BLD VENIPUNCTURE: CPT

## 2023-05-18 PROCEDURE — 99213 OFFICE O/P EST LOW 20 MIN: CPT | Performed by: NURSE PRACTITIONER

## 2023-05-18 PROCEDURE — 80061 LIPID PANEL: CPT

## 2023-05-18 SDOH — ECONOMIC STABILITY: FOOD INSECURITY: WITHIN THE PAST 12 MONTHS, THE FOOD YOU BOUGHT JUST DIDN'T LAST AND YOU DIDN'T HAVE MONEY TO GET MORE.: NEVER TRUE

## 2023-05-18 SDOH — ECONOMIC STABILITY: INCOME INSECURITY: HOW HARD IS IT FOR YOU TO PAY FOR THE VERY BASICS LIKE FOOD, HOUSING, MEDICAL CARE, AND HEATING?: NOT HARD AT ALL

## 2023-05-18 SDOH — ECONOMIC STABILITY: FOOD INSECURITY: WITHIN THE PAST 12 MONTHS, YOU WORRIED THAT YOUR FOOD WOULD RUN OUT BEFORE YOU GOT MONEY TO BUY MORE.: NEVER TRUE

## 2023-05-18 SDOH — ECONOMIC STABILITY: HOUSING INSECURITY
IN THE LAST 12 MONTHS, WAS THERE A TIME WHEN YOU DID NOT HAVE A STEADY PLACE TO SLEEP OR SLEPT IN A SHELTER (INCLUDING NOW)?: NO

## 2023-05-18 ASSESSMENT — PATIENT HEALTH QUESTIONNAIRE - PHQ9
9. THOUGHTS THAT YOU WOULD BE BETTER OFF DEAD, OR OF HURTING YOURSELF: 0
7. TROUBLE CONCENTRATING ON THINGS, SUCH AS READING THE NEWSPAPER OR WATCHING TELEVISION: 0
8. MOVING OR SPEAKING SO SLOWLY THAT OTHER PEOPLE COULD HAVE NOTICED. OR THE OPPOSITE, BEING SO FIGETY OR RESTLESS THAT YOU HAVE BEEN MOVING AROUND A LOT MORE THAN USUAL: 0
3. TROUBLE FALLING OR STAYING ASLEEP: 0
2. FEELING DOWN, DEPRESSED OR HOPELESS: 0
SUM OF ALL RESPONSES TO PHQ QUESTIONS 1-9: 0
SUM OF ALL RESPONSES TO PHQ9 QUESTIONS 1 & 2: 0
SUM OF ALL RESPONSES TO PHQ QUESTIONS 1-9: 0
6. FEELING BAD ABOUT YOURSELF - OR THAT YOU ARE A FAILURE OR HAVE LET YOURSELF OR YOUR FAMILY DOWN: 0
SUM OF ALL RESPONSES TO PHQ QUESTIONS 1-9: 0
4. FEELING TIRED OR HAVING LITTLE ENERGY: 0
5. POOR APPETITE OR OVEREATING: 0
1. LITTLE INTEREST OR PLEASURE IN DOING THINGS: 0
SUM OF ALL RESPONSES TO PHQ QUESTIONS 1-9: 0
10. IF YOU CHECKED OFF ANY PROBLEMS, HOW DIFFICULT HAVE THESE PROBLEMS MADE IT FOR YOU TO DO YOUR WORK, TAKE CARE OF THINGS AT HOME, OR GET ALONG WITH OTHER PEOPLE: 0

## 2023-05-18 NOTE — PROGRESS NOTES
Ayo Shane  : 1960  Encounter date: 2023    This alice 58 y.o. male who presents with  Chief Complaint   Patient presents with    Discuss Labs    Mental Health Problem     Anger issues x6mo, anxiety       History of present illness:    HPI PT is 58year old male for concerns regarding anger and anxiety, worsened in last 6 months. Pt reports affecting relationships, outbursts over small concerns. Pt has never been treated with psychotropic medications, hesitant of side effects. Pt reports good support systems. Denies depression. Current Outpatient Medications on File Prior to Visit   Medication Sig Dispense Refill    clobetasol (TEMOVATE) 0.05 % cream Apply topically daily Apply topically once daily 60 g 11    tamsulosin (FLOMAX) 0.4 MG capsule TAKE ONE CAPSULE BY MOUTH DAILY 90 capsule 4     No current facility-administered medications on file prior to visit.       No Known Allergies  Past Medical History:   Diagnosis Date    BPH (benign prostatic hyperplasia)     Psoriasis       Past Surgical History:   Procedure Laterality Date    COLONOSCOPY      CT NEEDLE BIOPSY LUNG PERCUTANEOUS  1/10/2022    CT NEEDLE BIOPSY LUNG PERCUTANEOUS 1/10/2022 [facility] CT SCAN    ESOPHAGUS SURGERY  2019    hernia    FOOT SURGERY  2019    bilateraly hammer toes    UPPER GASTROINTESTINAL ENDOSCOPY        Family History   Problem Relation Age of Onset    High Blood Pressure Mother     High Cholesterol Mother       Social History     Tobacco Use    Smoking status: Never    Smokeless tobacco: Never   Substance Use Topics    Alcohol use: Yes     Comment: occasionally        Review of Systems    Objective:    /76 (Site: Left Upper Arm, Position: Standing, Cuff Size: Large Adult)   Pulse 79   Temp 97.5 °F (36.4 °C) (Infrared)   Ht 6' 2\" (1.88 m)   Wt 287 lb (130.2 kg)   SpO2 96%   BMI 36.85 kg/m²   Weight - Scale: 287 lb (130.2 kg)     BP Readings from Last 3 Encounters:   23 124/76   22 128/76

## 2023-05-25 ENCOUNTER — OFFICE VISIT (OUTPATIENT)
Dept: FAMILY MEDICINE CLINIC | Age: 63
End: 2023-05-25
Payer: COMMERCIAL

## 2023-05-25 VITALS — TEMPERATURE: 97.6 F | HEART RATE: 88 BPM | OXYGEN SATURATION: 97 %

## 2023-05-25 DIAGNOSIS — F41.9 ANXIETY: Primary | ICD-10-CM

## 2023-05-25 PROCEDURE — 99213 OFFICE O/P EST LOW 20 MIN: CPT | Performed by: NURSE PRACTITIONER

## 2023-05-25 RX ORDER — DESVENLAFAXINE 25 MG/1
25 TABLET, EXTENDED RELEASE ORAL DAILY
Qty: 30 TABLET | Refills: 0 | Status: SHIPPED | OUTPATIENT
Start: 2023-05-25 | End: 2023-06-24

## 2023-05-25 NOTE — PROGRESS NOTES
Martinez Garcia  : 1960  Encounter date: 2023    This alice 58 y.o. male who presents with  Chief Complaint   Patient presents with    Anxiety    Discuss Labs     Gene sight results       History of present illness:    HPI Pt is 58year old male for Gene site follow up for anxiety. Pt has never been on medications, worries about side effects. Discussed pristiq as best option, if not covered by insurance would like to try Effexor after researching. Only adverse may be requiring lower doses. Current Outpatient Medications on File Prior to Visit   Medication Sig Dispense Refill    clobetasol (TEMOVATE) 0.05 % cream Apply topically daily Apply topically once daily 60 g 11    tamsulosin (FLOMAX) 0.4 MG capsule TAKE ONE CAPSULE BY MOUTH DAILY 90 capsule 4     No current facility-administered medications on file prior to visit.       No Known Allergies  Past Medical History:   Diagnosis Date    BPH (benign prostatic hyperplasia)     Psoriasis       Past Surgical History:   Procedure Laterality Date    COLONOSCOPY      CT NEEDLE BIOPSY LUNG PERCUTANEOUS  1/10/2022    CT NEEDLE BIOPSY LUNG PERCUTANEOUS 1/10/2022 [facility] CT SCAN    ESOPHAGUS SURGERY  2019    hernia    FOOT SURGERY  2019    bilateraly hammer toes    UPPER GASTROINTESTINAL ENDOSCOPY        Family History   Problem Relation Age of Onset    High Blood Pressure Mother     High Cholesterol Mother       Social History     Tobacco Use    Smoking status: Never    Smokeless tobacco: Never   Substance Use Topics    Alcohol use: Yes     Comment: occasionally        Review of Systems    Objective:    Pulse 88   Temp 97.6 °F (36.4 °C) (Infrared)   SpO2 97%         BP Readings from Last 3 Encounters:   23 124/76   22 128/76   22 122/80     Wt Readings from Last 3 Encounters:   23 287 lb (130.2 kg)   22 282 lb (127.9 kg)   22 271 lb (122.9 kg)     BMI Readings from Last 3 Encounters:   23 36.85 kg/m²   22 36.21

## 2023-06-27 ENCOUNTER — OFFICE VISIT (OUTPATIENT)
Dept: FAMILY MEDICINE CLINIC | Age: 63
End: 2023-06-27
Payer: COMMERCIAL

## 2023-06-27 VITALS
SYSTOLIC BLOOD PRESSURE: 136 MMHG | TEMPERATURE: 97.4 F | DIASTOLIC BLOOD PRESSURE: 80 MMHG | HEART RATE: 65 BPM | OXYGEN SATURATION: 98 % | WEIGHT: 287 LBS | BODY MASS INDEX: 36.85 KG/M2

## 2023-06-27 DIAGNOSIS — F41.9 ANXIETY: Primary | ICD-10-CM

## 2023-06-27 PROCEDURE — 99213 OFFICE O/P EST LOW 20 MIN: CPT | Performed by: NURSE PRACTITIONER

## 2023-06-27 RX ORDER — DESVENLAFAXINE SUCCINATE 50 MG/1
50 TABLET, EXTENDED RELEASE ORAL DAILY
Qty: 30 TABLET | Refills: 3 | Status: SHIPPED | OUTPATIENT
Start: 2023-06-27

## 2023-11-23 DIAGNOSIS — F41.9 ANXIETY: ICD-10-CM

## 2023-11-24 NOTE — TELEPHONE ENCOUNTER
Medication:   Requested Prescriptions     Pending Prescriptions Disp Refills    desvenlafaxine succinate (PRISTIQ) 50 MG TB24 extended release tablet [Pharmacy Med Name: Desvenlafaxine Succinate ER Oral Tablet Extended Release 24 Hour 50 MG] 30 tablet 0     Sig: TAKE 1 TABLET BY MOUTH EVERY DAY      Last Filled:  06/27/2023    Patient Phone Number: [phone] (home)     Last appt: 6/27/2023   Next appt: Visit date not found    Last OARRS:        No data to display              PDMP Monitoring:    Last PDMP [place] as Reviewed ([place]):  Review User Review Instant Review Result          Preferred Pharmacy:   [facility] [id number] - [place], [place] - [address] [phone] - F [phone]  [facility] [address]  [place] [id number]  Phone: [phone] Fax: [phone]    [facility] [id number] - [place], [place] - [address] [phone] - F [phone]  [address]  [place] [id number]  Phone: [phone] Fax: [phone]

## 2023-11-27 DIAGNOSIS — F41.9 ANXIETY: ICD-10-CM

## 2023-11-27 RX ORDER — DESVENLAFAXINE SUCCINATE 50 MG/1
50 TABLET, EXTENDED RELEASE ORAL DAILY
Qty: 30 TABLET | Refills: 0 | OUTPATIENT
Start: 2023-11-27

## 2023-11-28 RX ORDER — DESVENLAFAXINE SUCCINATE 50 MG/1
50 TABLET, EXTENDED RELEASE ORAL DAILY
Qty: 30 TABLET | Refills: 3 | OUTPATIENT
Start: 2023-11-28

## 2023-12-05 DIAGNOSIS — F41.9 ANXIETY: ICD-10-CM

## 2023-12-05 RX ORDER — DESVENLAFAXINE SUCCINATE 50 MG/1
50 TABLET, EXTENDED RELEASE ORAL DAILY
Qty: 30 TABLET | Refills: 0 | Status: SHIPPED | OUTPATIENT
Start: 2023-12-05

## 2023-12-08 ENCOUNTER — HOSPITAL ENCOUNTER (OUTPATIENT)
Age: 63
Discharge: HOME | End: 2023-12-08
Payer: COMMERCIAL

## 2023-12-08 LAB
ESTRADIOL SERPL-MCNC: 19 PG/ML
HCT VFR BLD AUTO: 50.9 % (ref 40.5–52.5)

## 2023-12-08 PROCEDURE — 84403 ASSAY OF TOTAL TESTOSTERONE: CPT

## 2023-12-08 PROCEDURE — 82670 ASSAY OF TOTAL ESTRADIOL: CPT

## 2023-12-08 PROCEDURE — 36415 COLL VENOUS BLD VENIPUNCTURE: CPT

## 2023-12-08 PROCEDURE — 85014 HEMATOCRIT: CPT

## 2023-12-13 ENCOUNTER — OFFICE VISIT (OUTPATIENT)
Dept: FAMILY MEDICINE CLINIC | Age: 63
End: 2023-12-13
Payer: COMMERCIAL

## 2023-12-13 ENCOUNTER — TELEPHONE (OUTPATIENT)
Dept: ADMINISTRATIVE | Age: 63
End: 2023-12-13

## 2023-12-13 VITALS
DIASTOLIC BLOOD PRESSURE: 86 MMHG | TEMPERATURE: 97.1 F | BODY MASS INDEX: 37.86 KG/M2 | WEIGHT: 295 LBS | HEIGHT: 74 IN | SYSTOLIC BLOOD PRESSURE: 144 MMHG | OXYGEN SATURATION: 98 % | HEART RATE: 72 BPM

## 2023-12-13 DIAGNOSIS — M25.552 CHRONIC LEFT HIP PAIN: ICD-10-CM

## 2023-12-13 DIAGNOSIS — G89.29 CHRONIC LEFT HIP PAIN: ICD-10-CM

## 2023-12-13 DIAGNOSIS — F41.9 ANXIETY: ICD-10-CM

## 2023-12-13 DIAGNOSIS — E66.01 SEVERE OBESITY (BMI 35.0-39.9) WITH COMORBIDITY (HCC): ICD-10-CM

## 2023-12-13 DIAGNOSIS — R73.9 HYPERGLYCEMIA: ICD-10-CM

## 2023-12-13 DIAGNOSIS — E78.00 PURE HYPERCHOLESTEROLEMIA: ICD-10-CM

## 2023-12-13 DIAGNOSIS — E66.1 CLASS 2 DRUG-INDUCED OBESITY WITHOUT SERIOUS COMORBIDITY WITH BODY MASS INDEX (BMI) OF 37.0 TO 37.9 IN ADULT: ICD-10-CM

## 2023-12-13 DIAGNOSIS — R73.03 PREDIABETES: ICD-10-CM

## 2023-12-13 DIAGNOSIS — Z00.00 PREVENTATIVE HEALTH CARE: Primary | ICD-10-CM

## 2023-12-13 PROCEDURE — 99396 PREV VISIT EST AGE 40-64: CPT | Performed by: NURSE PRACTITIONER

## 2023-12-13 RX ORDER — DESVENLAFAXINE SUCCINATE 50 MG/1
50 TABLET, EXTENDED RELEASE ORAL DAILY
Qty: 90 TABLET | Refills: 3 | Status: SHIPPED | OUTPATIENT
Start: 2023-12-13

## 2023-12-13 RX ORDER — MELOXICAM 15 MG/1
15 TABLET ORAL DAILY PRN
Qty: 30 TABLET | Refills: 3 | Status: SHIPPED | OUTPATIENT
Start: 2023-12-13

## 2023-12-13 RX ORDER — TESTOSTERONE 20.25 MG/1.25G
GEL TOPICAL
COMMUNITY
Start: 2023-11-07

## 2023-12-13 NOTE — TELEPHONE ENCOUNTER
Submitted PA for Mounjaro 2.5MG/0.5ML pen-injectors  Via CMM (Key: [id number]) STATUS: PENDING.    Follow up done daily; if no response in three days we will refax for status check.  If another three days goes by with no response we will call the insurance for status.

## 2023-12-14 NOTE — TELEPHONE ENCOUNTER
The medication was DENIED; DENIAL letter uploaded to MEDIA.    Coverage is provided for type 2 diabetes mellitus.    If you want an APPEAL; please note in this encounter what new information you would like to APPEAL with.  Once complete route back to PA POOL.    If this requires a response please respond to the pool ( P [facility] MEDICATION PRE-AUTH).      Thank you please advise patient.

## 2023-12-15 LAB — TESTOST SERPL-MCNC: 219 NG/DL (ref 220–1000)

## 2024-01-05 NOTE — PROCEDURE: LIQUID NITROGEN
Render Note In Bullet Format When Appropriate: No
Detail Level: Simple
nivolumab
Consent: The patient's consent was obtained including but not limited to risks of crusting, scabbing, blistering, scarring, darker or lighter pigmentary change, recurrence, incomplete removal and infection.
Number Of Freeze-Thaw Cycles: 1 freeze-thaw cycle
Duration Of Freeze Thaw-Cycle (Seconds): 5
Post-Care Instructions: I reviewed with the patient in detail post-care instructions. Patient is to wear sunprotection, and avoid picking at any of the treated lesions. Pt may apply Vaseline to crusted or scabbing areas.

## 2024-02-12 DIAGNOSIS — L40.9 PSORIASIS: ICD-10-CM

## 2024-02-12 RX ORDER — CLOBETASOL PROPIONATE 0.5 MG/G
CREAM TOPICAL
Qty: 60 G | Refills: 0 | Status: SHIPPED | OUTPATIENT
Start: 2024-02-12

## 2024-03-18 ENCOUNTER — HOSPITAL ENCOUNTER (OUTPATIENT)
Age: 64
Discharge: HOME | End: 2024-03-18
Payer: COMMERCIAL

## 2024-03-18 DIAGNOSIS — Z00.00 PREVENTATIVE HEALTH CARE: ICD-10-CM

## 2024-03-18 DIAGNOSIS — E78.00 PURE HYPERCHOLESTEROLEMIA: ICD-10-CM

## 2024-03-18 DIAGNOSIS — R73.9 HYPERGLYCEMIA: ICD-10-CM

## 2024-03-18 LAB
ALBUMIN SERPL-MCNC: 4.5 G/DL (ref 3.4–5)
ALBUMIN/GLOB SERPL: 1.4 {RATIO} (ref 1.1–2.2)
ALP SERPL-CCNC: 53 U/L (ref 40–129)
ALT SERPL-CCNC: 65 U/L (ref 10–40)
ANION GAP SERPL CALCULATED.3IONS-SCNC: 11 MMOL/L (ref 3–16)
AST SERPL-CCNC: 33 U/L (ref 15–37)
BILIRUB SERPL-MCNC: 1 MG/DL (ref 0–1)
BUN SERPL-MCNC: 19 MG/DL (ref 7–20)
CALCIUM SERPL-MCNC: 9.8 MG/DL (ref 8.3–10.6)
CHLORIDE SERPL-SCNC: 103 MMOL/L (ref 99–110)
CHOLEST SERPL-MCNC: 194 MG/DL (ref 0–199)
CO2 SERPL-SCNC: 28 MMOL/L (ref 21–32)
CREAT SERPL-MCNC: 0.8 MG/DL (ref 0.8–1.3)
EST. AVERAGE GLUCOSE BLD GHB EST-MCNC: 128.4 MG/DL
ESTRADIOL SERPL-MCNC: 27 PG/ML
GFR SERPLBLD CREATININE-BSD FMLA CKD-EPI: >60 ML/MIN/{1.73_M2}
GLUCOSE P FAST SERPL-MCNC: 106 MG/DL (ref 70–99)
HBA1C MFR BLD: 6.1 %
HCT VFR BLD AUTO: 51.6 % (ref 40.5–52.5)
HCV AB SERPL QL IA: NORMAL
HDLC SERPL-MCNC: 32 MG/DL (ref 40–60)
LDL CHOLESTEROL CALCULATED: 137 MG/DL
POTASSIUM SERPL-SCNC: 4.8 MMOL/L (ref 3.5–5.1)
PROT SERPL-MCNC: 7.7 G/DL (ref 6.4–8.2)
SODIUM SERPL-SCNC: 142 MMOL/L (ref 136–145)
TRIGL SERPL-MCNC: 126 MG/DL (ref 0–150)
VLDLC SERPL CALC-MCNC: 25 MG/DL

## 2024-03-18 PROCEDURE — 82670 ASSAY OF TOTAL ESTRADIOL: CPT

## 2024-03-18 PROCEDURE — 84403 ASSAY OF TOTAL TESTOSTERONE: CPT

## 2024-03-18 PROCEDURE — 83036 HEMOGLOBIN GLYCOSYLATED A1C: CPT

## 2024-03-18 PROCEDURE — 86803 HEPATITIS C AB TEST: CPT

## 2024-03-18 PROCEDURE — 80053 COMPREHEN METABOLIC PANEL: CPT

## 2024-03-18 PROCEDURE — 80061 LIPID PANEL: CPT

## 2024-03-18 PROCEDURE — 85014 HEMATOCRIT: CPT

## 2024-03-18 PROCEDURE — 36415 COLL VENOUS BLD VENIPUNCTURE: CPT

## 2024-03-19 DIAGNOSIS — E78.00 PURE HYPERCHOLESTEROLEMIA: ICD-10-CM

## 2024-03-19 DIAGNOSIS — R73.03 PREDIABETES: Primary | ICD-10-CM

## 2024-03-21 LAB — TESTOST SERPL-MCNC: 663 NG/DL (ref 193–740)

## 2024-04-03 ENCOUNTER — OFFICE VISIT (OUTPATIENT)
Dept: FAMILY MEDICINE CLINIC | Age: 64
End: 2024-04-03
Payer: MEDICARE

## 2024-04-03 VITALS
HEART RATE: 55 BPM | SYSTOLIC BLOOD PRESSURE: 128 MMHG | TEMPERATURE: 97.4 F | DIASTOLIC BLOOD PRESSURE: 82 MMHG | OXYGEN SATURATION: 95 % | WEIGHT: 298 LBS | BODY MASS INDEX: 38.26 KG/M2

## 2024-04-03 DIAGNOSIS — Z01.818 PREOP EXAMINATION: ICD-10-CM

## 2024-04-03 DIAGNOSIS — N52.9 ERECTILE DYSFUNCTION, UNSPECIFIED ERECTILE DYSFUNCTION TYPE: ICD-10-CM

## 2024-04-03 DIAGNOSIS — Z01.818 PREOP EXAMINATION: Primary | ICD-10-CM

## 2024-04-03 LAB
ANION GAP SERPL CALCULATED.3IONS-SCNC: 10 MMOL/L (ref 3–16)
BASOPHILS # BLD: 0.1 K/UL (ref 0–0.2)
BASOPHILS NFR BLD: 1.1 %
BUN SERPL-MCNC: 15 MG/DL (ref 7–20)
CALCIUM SERPL-MCNC: 9.7 MG/DL (ref 8.3–10.6)
CHLORIDE SERPL-SCNC: 103 MMOL/L (ref 99–110)
CO2 SERPL-SCNC: 28 MMOL/L (ref 21–32)
CREAT SERPL-MCNC: 1 MG/DL (ref 0.8–1.3)
DEPRECATED RDW RBC AUTO: 14 % (ref 12.4–15.4)
EOSINOPHIL # BLD: 0.5 K/UL (ref 0–0.6)
EOSINOPHIL NFR BLD: 8.6 %
GFR SERPLBLD CREATININE-BSD FMLA CKD-EPI: 83 ML/MIN/{1.73_M2}
GLUCOSE SERPL-MCNC: 104 MG/DL (ref 70–99)
HCT VFR BLD AUTO: 50.7 % (ref 40.5–52.5)
HGB BLD-MCNC: 17.2 G/DL (ref 13.5–17.5)
LYMPHOCYTES # BLD: 2.2 K/UL (ref 1–5.1)
LYMPHOCYTES NFR BLD: 40.3 %
MCH RBC QN AUTO: 30.8 PG (ref 26–34)
MCHC RBC AUTO-ENTMCNC: 33.9 G/DL (ref 31–36)
MCV RBC AUTO: 91.1 FL (ref 80–100)
MONOCYTES # BLD: 0.8 K/UL (ref 0–1.3)
MONOCYTES NFR BLD: 14 %
NEUTROPHILS # BLD: 2 K/UL (ref 1.7–7.7)
NEUTROPHILS NFR BLD: 36 %
PLATELET # BLD AUTO: 180 K/UL (ref 135–450)
PMV BLD AUTO: 8.6 FL (ref 5–10.5)
POTASSIUM SERPL-SCNC: 4.8 MMOL/L (ref 3.5–5.1)
RBC # BLD AUTO: 5.57 M/UL (ref 4.2–5.9)
SODIUM SERPL-SCNC: 141 MMOL/L (ref 136–145)
WBC # BLD AUTO: 5.5 K/UL (ref 4–11)

## 2024-04-03 PROCEDURE — 1123F ACP DISCUSS/DSCN MKR DOCD: CPT | Performed by: NURSE PRACTITIONER

## 2024-04-03 PROCEDURE — G8417 CALC BMI ABV UP PARAM F/U: HCPCS | Performed by: NURSE PRACTITIONER

## 2024-04-03 PROCEDURE — 99213 OFFICE O/P EST LOW 20 MIN: CPT | Performed by: NURSE PRACTITIONER

## 2024-04-03 PROCEDURE — G8427 DOCREV CUR MEDS BY ELIG CLIN: HCPCS | Performed by: NURSE PRACTITIONER

## 2024-04-03 PROCEDURE — 93000 ELECTROCARDIOGRAM COMPLETE: CPT | Performed by: NURSE PRACTITIONER

## 2024-04-03 PROCEDURE — 1036F TOBACCO NON-USER: CPT | Performed by: NURSE PRACTITIONER

## 2024-04-03 PROCEDURE — 3017F COLORECTAL CA SCREEN DOC REV: CPT | Performed by: NURSE PRACTITIONER

## 2024-04-03 RX ORDER — OXYBUTYNIN CHLORIDE 10 MG/1
10 TABLET, EXTENDED RELEASE ORAL DAILY
COMMUNITY
Start: 2024-03-27

## 2024-04-03 ASSESSMENT — PATIENT HEALTH QUESTIONNAIRE - PHQ9
SUM OF ALL RESPONSES TO PHQ QUESTIONS 1-9: 0
SUM OF ALL RESPONSES TO PHQ QUESTIONS 1-9: 0
1. LITTLE INTEREST OR PLEASURE IN DOING THINGS: NOT AT ALL
SUM OF ALL RESPONSES TO PHQ QUESTIONS 1-9: 0
SUM OF ALL RESPONSES TO PHQ9 QUESTIONS 1 & 2: 0
SUM OF ALL RESPONSES TO PHQ QUESTIONS 1-9: 0
2. FEELING DOWN, DEPRESSED OR HOPELESS: NOT AT ALL

## 2024-04-03 NOTE — PROGRESS NOTES
Preoperative Consultation    Esteban Jones  YOB: 1960    This patient presents to the office today for a preoperative consultation at the request of surgeon, Dr. Mai, who plans on performing insertion mechanical component penile prosthesis on April 5 at [facility].      Planned anesthesia: General   Known anesthesia problems: None   Bleeding risk: No recent or remote history of abnormal bleeding  Personal or FH of DVT/PE: No      Patient Active Problem List   Diagnosis    Benign prostatic hyperplasia with post-void dribbling    Urinary frequency    Psoriasis    Pneumonia    Nocardiosis    Anxiety     Past Surgical History:   Procedure Laterality Date    COLONOSCOPY      CT NEEDLE BIOPSY LUNG PERCUTANEOUS  1/10/2022    CT NEEDLE BIOPSY LUNG PERCUTANEOUS 1/10/2022 [facility] CT SCAN    ESOPHAGUS SURGERY  2019    hernia    FOOT SURGERY  2019    bilateraly hammer toes    UPPER GASTROINTESTINAL ENDOSCOPY         No Known Allergies  Outpatient Medications Marked as Taking for the 4/3/24 encounter (Office Visit) with Nevaeh Tompkins APRN - NP   Medication Sig Dispense Refill    oxyBUTYnin (DITROPAN-XL) 10 MG extended release tablet Take 1 tablet by mouth daily      metFORMIN (GLUCOPHAGE) 500 MG tablet Take 1 tablet by mouth daily (with breakfast) 90 tablet 1    clobetasol (TEMOVATE) 0.05 % cream Apply topically once daily as instructed 60 g 0    Testosterone 1.62 % GEL Apply ONE milliliter TO underarm, upper arm, OR shoulder ONCE daily AFTER showering. WASH HANDS immediately AFTER applying.      desvenlafaxine succinate (PRISTIQ) 50 MG TB24 extended release tablet Take 1 tablet by mouth daily 90 tablet 3    meloxicam (MOBIC) 15 MG tablet Take 1 tablet by mouth daily as needed for Pain 30 tablet 3       Social History     Tobacco Use    Smoking status: Never    Smokeless tobacco: Never   Substance Use Topics    Alcohol use: Yes     Comment: occasionally     Family History   Problem

## 2024-06-04 NOTE — PROCEDURE: EXCISION
[facility] Report           Steffany Cotter    2006   /66 (BP Location: Right arm, Patient Position: Sitting)   Pulse 78   Resp (!) 24   Ht 5' (1.524 m)   Wt 64.4 kg (141 lb 15.6 oz)   SpO2 98%   BMI 27.73 kg/m²       Indications: Mireles syndrome, BAV dilated root AI.  SVT S/P RFA(9/15/23)     M mode: normal atrial and ventricular dimensions.  LV wall dimensions and FS are normal.  No effusion seen  ALEXA not appreciated.         2D: Normal situs, Levocardia, atrial and ventricular concordance  and normal position of great vessels(S,D,N).    The IVC and SVC are normal.    There is no evidence for a persistent LSVC.   Great Vessels are normally related.   The aortic valve is bicuspid.    The pulmonary valve is anterior and normal appearing without bowing or thickening. The branch pulmonary arteries are confluent and well formed.  The tricuspid valve appears normal with no Ebstein or other malformations.  The mitral valve is not dysplastic and there is no gross prolapse in multiple views.   The atrial septum appears intact by 2D imaging.   The ventricular septum appears intact.  The right ventricle is not enlarged and appears to have normal systolic wall motion.  The left ventricle appears of normal dimensions and normal wall motion with no septal or segmental abnormalities.  The proximal left coronary artery appears normal including the LAD.  The right coronary anatomy appears of normal dimensions and location.  The aortic arch appears left sided with normal head and neck branching and no findings concerning for a discrete coarctation. There is no effusion.       Color, PW and CW Doppler:  Normal IVC and SVC flow.  The atrial septum appears intact by color imaging.  At least one pulmonary vein was seen on each side with normal unobstructed insertion into  the posterior left atrium.     The ventricular septum is intact. The tricuspid valve function appears normal with normal septal  attachment and no significant insufficiency and no stenosis.  The mitral valve function is normal with no insufficiency or stenosis.  There is no significant pulmonary insufficiency.  There is no stenosis at the pulmonary valve, subvalvular or supravalvular level.  There is no significant stenosis at the bilateral branch pulmonary arteries.  The aortic valve is bicuspid with hinge points at 3 and 9 with mild plus AI P 1/2 time 510 ms, 4.  mm diam at orifice.  The AI is concentric.  There is no AS with peak tavo 1.6 m/s.  There is mild aortic sinus(35mm), and ascending aorta (37.  mm) enlargement.   There is no sub or supraAS.  The doppler assessment was from multiple views.    Diastolic flow was seen into the LCA.  Aortic arch doppler profiles are normal with no findings concerning for a discrete coarctation.      Impression:  non dysplastic bicuspid aortic valve with mild plus stable insufficiency with mild LVE.  Mild plus stable aortic sinus and ascending aorta dilation.  No aortic stenosis or coarctation.         BRIT Reyes MD         V-Y Plasty Text: The defect edges were debeveled with a #15 scalpel blade.  Given the location of the defect, shape of the defect and the proximity to free margins an V-Y advancement flap was deemed most appropriate.  Using a sterile surgical marker, an appropriate advancement flap was drawn incorporating the defect and placing the expected incisions within the relaxed skin tension lines where possible.    The area thus outlined was incised deep to adipose tissue with a #15 scalpel blade.  The skin margins were undermined to an appropriate distance in all directions utilizing iris scissors.

## 2024-11-26 ENCOUNTER — TELEPHONE (OUTPATIENT)
Dept: FAMILY MEDICINE CLINIC | Age: 64
End: 2024-11-26

## 2024-11-26 NOTE — TELEPHONE ENCOUNTER
oxyBUTYnin (DITROPAN-XL) 10 MG extended release tablet     [id number] - [place], [place] - [address] - P [phone] - F [phone] [address], [facility] [address]  Phone: [phone]  Fax: [phone]

## 2024-11-26 NOTE — TELEPHONE ENCOUNTER
Called left VM that he needed to make an apt. For an AWV before we could get him his oxyBUTYnin 10 MG extended release tablet refill. If he calls back we need to schedule his apt.

## 2024-12-02 ENCOUNTER — TELEPHONE (OUTPATIENT)
Dept: FAMILY MEDICINE CLINIC | Age: 64
End: 2024-12-02

## 2024-12-02 RX ORDER — OXYBUTYNIN CHLORIDE 10 MG/1
10 TABLET, EXTENDED RELEASE ORAL DAILY
Qty: 30 TABLET | Refills: 0 | Status: SHIPPED | OUTPATIENT
Start: 2024-12-02 | End: 2024-12-03 | Stop reason: SDUPTHER

## 2024-12-02 SDOH — ECONOMIC STABILITY: FOOD INSECURITY: WITHIN THE PAST 12 MONTHS, THE FOOD YOU BOUGHT JUST DIDN'T LAST AND YOU DIDN'T HAVE MONEY TO GET MORE.: NEVER TRUE

## 2024-12-02 SDOH — ECONOMIC STABILITY: INCOME INSECURITY: HOW HARD IS IT FOR YOU TO PAY FOR THE VERY BASICS LIKE FOOD, HOUSING, MEDICAL CARE, AND HEATING?: NOT HARD AT ALL

## 2024-12-02 SDOH — HEALTH STABILITY: PHYSICAL HEALTH: ON AVERAGE, HOW MANY MINUTES DO YOU ENGAGE IN EXERCISE AT THIS LEVEL?: 20 MIN

## 2024-12-02 SDOH — ECONOMIC STABILITY: FOOD INSECURITY: WITHIN THE PAST 12 MONTHS, YOU WORRIED THAT YOUR FOOD WOULD RUN OUT BEFORE YOU GOT MONEY TO BUY MORE.: NEVER TRUE

## 2024-12-02 SDOH — HEALTH STABILITY: PHYSICAL HEALTH: ON AVERAGE, HOW MANY DAYS PER WEEK DO YOU ENGAGE IN MODERATE TO STRENUOUS EXERCISE (LIKE A BRISK WALK)?: 1 DAY

## 2024-12-02 SDOH — ECONOMIC STABILITY: TRANSPORTATION INSECURITY
IN THE PAST 12 MONTHS, HAS LACK OF TRANSPORTATION KEPT YOU FROM MEETINGS, WORK, OR FROM GETTING THINGS NEEDED FOR DAILY LIVING?: NO

## 2024-12-02 ASSESSMENT — LIFESTYLE VARIABLES
HOW OFTEN DURING THE LAST YEAR HAVE YOU HAD A FEELING OF GUILT OR REMORSE AFTER DRINKING: NEVER
HOW OFTEN DURING THE LAST YEAR HAVE YOU FAILED TO DO WHAT WAS NORMALLY EXPECTED FROM YOU BECAUSE OF DRINKING: NEVER
HOW OFTEN DURING THE LAST YEAR HAVE YOU NEEDED AN ALCOHOLIC DRINK FIRST THING IN THE MORNING TO GET YOURSELF GOING AFTER A NIGHT OF HEAVY DRINKING: NEVER
HOW OFTEN DURING THE LAST YEAR HAVE YOU FOUND THAT YOU WERE NOT ABLE TO STOP DRINKING ONCE YOU HAD STARTED: NEVER
HOW OFTEN DO YOU HAVE SIX OR MORE DRINKS ON ONE OCCASION: 2
HOW MANY STANDARD DRINKS CONTAINING ALCOHOL DO YOU HAVE ON A TYPICAL DAY: 3 OR 4
HOW MANY STANDARD DRINKS CONTAINING ALCOHOL DO YOU HAVE ON A TYPICAL DAY: 2
HAS A RELATIVE, FRIEND, DOCTOR, OR ANOTHER HEALTH PROFESSIONAL EXPRESSED CONCERN ABOUT YOUR DRINKING OR SUGGESTED YOU CUT DOWN: NO
HOW OFTEN DURING THE LAST YEAR HAVE YOU BEEN UNABLE TO REMEMBER WHAT HAPPENED THE NIGHT BEFORE BECAUSE YOU HAD BEEN DRINKING: NEVER
HOW OFTEN DURING THE LAST YEAR HAVE YOU NEEDED AN ALCOHOLIC DRINK FIRST THING IN THE MORNING TO GET YOURSELF GOING AFTER A NIGHT OF HEAVY DRINKING: NEVER
HOW OFTEN DURING THE LAST YEAR HAVE YOU FAILED TO DO WHAT WAS NORMALLY EXPECTED FROM YOU BECAUSE OF DRINKING: NEVER
HOW OFTEN DURING THE LAST YEAR HAVE YOU BEEN UNABLE TO REMEMBER WHAT HAPPENED THE NIGHT BEFORE BECAUSE YOU HAD BEEN DRINKING: NEVER
HAS A RELATIVE, FRIEND, DOCTOR, OR ANOTHER HEALTH PROFESSIONAL EXPRESSED CONCERN ABOUT YOUR DRINKING OR SUGGESTED YOU CUT DOWN: NO
HOW OFTEN DO YOU HAVE A DRINK CONTAINING ALCOHOL: 2-4 TIMES A MONTH
HAVE YOU OR SOMEONE ELSE BEEN INJURED AS A RESULT OF YOUR DRINKING: NO
HAVE YOU OR SOMEONE ELSE BEEN INJURED AS A RESULT OF YOUR DRINKING: NO
HOW OFTEN DO YOU HAVE A DRINK CONTAINING ALCOHOL: 3
HOW OFTEN DURING THE LAST YEAR HAVE YOU FOUND THAT YOU WERE NOT ABLE TO STOP DRINKING ONCE YOU HAD STARTED: NEVER
HOW OFTEN DURING THE LAST YEAR HAVE YOU HAD A FEELING OF GUILT OR REMORSE AFTER DRINKING: NEVER

## 2024-12-02 ASSESSMENT — PATIENT HEALTH QUESTIONNAIRE - PHQ9
2. FEELING DOWN, DEPRESSED OR HOPELESS: SEVERAL DAYS
SUM OF ALL RESPONSES TO PHQ QUESTIONS 1-9: 1
1. LITTLE INTEREST OR PLEASURE IN DOING THINGS: NOT AT ALL
SUM OF ALL RESPONSES TO PHQ QUESTIONS 1-9: 1
SUM OF ALL RESPONSES TO PHQ9 QUESTIONS 1 & 2: 1
SUM OF ALL RESPONSES TO PHQ QUESTIONS 1-9: 1
SUM OF ALL RESPONSES TO PHQ QUESTIONS 1-9: 1

## 2024-12-02 NOTE — TELEPHONE ENCOUNTER
oxyBUTYnin (DITROPAN-XL) 10 MG extended release tablet      [facility] - [place], [place] - [address] - P [phone] - F [phone] [address], [facility] [address]  Phone: [phone]  Fax: [phone]             Patient has an apt scheduled for 12/03 @ 9:00 am can you please fill prescription since he is completely out. Please call patient if this is complete

## 2024-12-03 ENCOUNTER — OFFICE VISIT (OUTPATIENT)
Dept: FAMILY MEDICINE CLINIC | Age: 64
End: 2024-12-03
Payer: MEDICARE

## 2024-12-03 VITALS
OXYGEN SATURATION: 95 % | HEIGHT: 74 IN | SYSTOLIC BLOOD PRESSURE: 112 MMHG | DIASTOLIC BLOOD PRESSURE: 74 MMHG | HEART RATE: 85 BPM | BODY MASS INDEX: 37.35 KG/M2 | WEIGHT: 291 LBS | TEMPERATURE: 97.7 F

## 2024-12-03 DIAGNOSIS — E66.812 CLASS 2 SEVERE OBESITY DUE TO EXCESS CALORIES WITH SERIOUS COMORBIDITY AND BODY MASS INDEX (BMI) OF 37.0 TO 37.9 IN ADULT: ICD-10-CM

## 2024-12-03 DIAGNOSIS — R79.89 LOW TESTOSTERONE: ICD-10-CM

## 2024-12-03 DIAGNOSIS — F41.9 ANXIETY: ICD-10-CM

## 2024-12-03 DIAGNOSIS — M17.0 OSTEOARTHRITIS OF BOTH KNEES, UNSPECIFIED OSTEOARTHRITIS TYPE: ICD-10-CM

## 2024-12-03 DIAGNOSIS — R73.03 PREDIABETES: ICD-10-CM

## 2024-12-03 DIAGNOSIS — Z00.00 ENCOUNTER FOR INITIAL ANNUAL WELLNESS VISIT (AWV) IN MEDICARE PATIENT: Primary | ICD-10-CM

## 2024-12-03 DIAGNOSIS — E66.01 CLASS 2 SEVERE OBESITY DUE TO EXCESS CALORIES WITH SERIOUS COMORBIDITY AND BODY MASS INDEX (BMI) OF 37.0 TO 37.9 IN ADULT: ICD-10-CM

## 2024-12-03 PROCEDURE — 3017F COLORECTAL CA SCREEN DOC REV: CPT | Performed by: NURSE PRACTITIONER

## 2024-12-03 PROCEDURE — G0438 PPPS, INITIAL VISIT: HCPCS | Performed by: NURSE PRACTITIONER

## 2024-12-03 PROCEDURE — 1123F ACP DISCUSS/DSCN MKR DOCD: CPT | Performed by: NURSE PRACTITIONER

## 2024-12-03 PROCEDURE — G8484 FLU IMMUNIZE NO ADMIN: HCPCS | Performed by: NURSE PRACTITIONER

## 2024-12-03 RX ORDER — DESVENLAFAXINE 50 MG/1
50 TABLET, FILM COATED, EXTENDED RELEASE ORAL DAILY
Qty: 90 TABLET | Refills: 1 | Status: SHIPPED | OUTPATIENT
Start: 2024-12-03

## 2024-12-03 RX ORDER — OXYBUTYNIN CHLORIDE 10 MG/1
10 TABLET, EXTENDED RELEASE ORAL DAILY
Qty: 90 TABLET | Refills: 1 | Status: SHIPPED | OUTPATIENT
Start: 2024-12-03

## 2024-12-03 RX ORDER — MELOXICAM 15 MG/1
15 TABLET ORAL DAILY PRN
Qty: 90 TABLET | Refills: 0 | Status: SHIPPED | OUTPATIENT
Start: 2024-12-03 | End: 2025-03-03

## 2024-12-03 RX ORDER — TIRZEPATIDE 2.5 MG/.5ML
2.5 INJECTION, SOLUTION SUBCUTANEOUS
Qty: 2 ML | Refills: 0 | Status: SHIPPED | OUTPATIENT
Start: 2024-12-03

## 2024-12-03 NOTE — PROGRESS NOTES
MEDICARE ANNUAL WELLNESS VISIT    Patient is here for their Medicare Annual Wellness Visit .  Pt is due for labs, previous A1c- 6.1.  Pt reports previously taken topical Testosterone for lower levels, currently not on medication, will recheck.  PT with existing anxiety, well controlled.  Reports bilateral knee pain d/t OA and worsened with obesity.  Previously sent over ozempic for sugar control and weight, not covered by insurance.  Will trial Zepbound.  No other concerns at this time.    Last eye exam: 2024  Last dental exam: 2024  Exercise:  weekly, walking  Do you eat balanced/healthy meals regularly? No     How would you rate your overall health? : Good            12/2/2024    12:04 PM 4/3/2024     9:31 AM   Fall Risk   Do you feel unsteady or are you worried about falling?  no no   2 or more falls in past year? no no   Fall with injury in past year? no no           12/2/2024    12:04 PM 4/3/2024     9:31 AM 5/18/2023     1:16 PM 6/28/2022     8:32 AM 8/24/2021     7:37 AM   PHQ Scores   PHQ2 Score 1 0 0 0 0   PHQ9 Score 1 0 0 0 0         Do you always wear a seat belt in the car?: No      Have you noted any problems with hearing?: No  Have you noted any vision problems?: Yes  Do you have concerns about your sexual health?: no  In the past month how much has pain been an issue for you?:  Not at all  In the past month have you had issues with anxiety, loneliness, irritability or fatigue:  Not at all    Do you take opioid medications even sometimes? No (if using assess risk and whether other treatments would be beneficial)    Living Will and/or Healthcare POA: No,   Additional information provided      Healthcare Decision Maker:    Primary Decision Maker: Fili levin - Spouse - [phone]           Who lives at home with you: wife  Do you have any pets? none  Do you have any services coming to your home (meals on wheels, home health, etc) ?: no      Do you need help with:  Using the phone:  No  Bathing:

## 2024-12-09 ENCOUNTER — HOSPITAL ENCOUNTER (OUTPATIENT)
Age: 64
Discharge: HOME | End: 2024-12-09
Payer: MEDICARE

## 2024-12-09 DIAGNOSIS — E78.00 PURE HYPERCHOLESTEROLEMIA: ICD-10-CM

## 2024-12-09 DIAGNOSIS — R73.03 PREDIABETES: ICD-10-CM

## 2024-12-09 DIAGNOSIS — R79.89 LOW TESTOSTERONE: ICD-10-CM

## 2024-12-09 LAB
ALBUMIN SERPL-MCNC: 4.4 G/DL (ref 3.4–5)
ALBUMIN/GLOB SERPL: 1.3 {RATIO} (ref 1.1–2.2)
ALP SERPL-CCNC: 49 U/L (ref 40–129)
ALT SERPL-CCNC: 44 U/L (ref 10–40)
ANION GAP SERPL CALCULATED.3IONS-SCNC: 11 MMOL/L (ref 3–16)
AST SERPL-CCNC: 30 U/L (ref 15–37)
BASOPHILS # BLD: 0.1 K/UL (ref 0–0.2)
BASOPHILS NFR BLD: 0.8 %
BILIRUB SERPL-MCNC: 0.6 MG/DL (ref 0–1)
BUN SERPL-MCNC: 21 MG/DL (ref 7–20)
CALCIUM SERPL-MCNC: 9.7 MG/DL (ref 8.3–10.6)
CHLORIDE SERPL-SCNC: 105 MMOL/L (ref 99–110)
CHOLEST SERPL-MCNC: 184 MG/DL (ref 0–199)
CO2 SERPL-SCNC: 27 MMOL/L (ref 21–32)
CREAT SERPL-MCNC: 1 MG/DL (ref 0.8–1.3)
DEPRECATED RDW RBC AUTO: 14.4 % (ref 12.4–15.4)
EOSINOPHIL # BLD: 0 K/UL (ref 0–0.6)
EOSINOPHIL NFR BLD: 0 %
GFR SERPLBLD CREATININE-BSD FMLA CKD-EPI: 83 ML/MIN/{1.73_M2}
GLUCOSE P FAST SERPL-MCNC: 99 MG/DL (ref 70–99)
HCT VFR BLD AUTO: 50.6 % (ref 40.5–52.5)
HDLC SERPL-MCNC: 35 MG/DL (ref 40–60)
HGB BLD-MCNC: 16.9 G/DL (ref 13.5–17.5)
LDL CHOLESTEROL: 135 MG/DL
LYMPHOCYTES # BLD: 2.8 K/UL (ref 1–5.1)
LYMPHOCYTES NFR BLD: 42.4 %
MCH RBC QN AUTO: 31.2 PG (ref 26–34)
MCHC RBC AUTO-ENTMCNC: 33.4 G/DL (ref 31–36)
MCV RBC AUTO: 93.3 FL (ref 80–100)
MONOCYTES # BLD: 0.9 K/UL (ref 0–1.3)
MONOCYTES NFR BLD: 13.5 %
NEUTROPHILS # BLD: 2.9 K/UL (ref 1.7–7.7)
NEUTROPHILS NFR BLD: 43.3 %
PLATELET # BLD AUTO: 244 K/UL (ref 135–450)
PMV BLD AUTO: 8 FL (ref 5–10.5)
POTASSIUM SERPL-SCNC: 5 MMOL/L (ref 3.5–5.1)
PROT SERPL-MCNC: 7.7 G/DL (ref 6.4–8.2)
RBC # BLD AUTO: 5.42 M/UL (ref 4.2–5.9)
SODIUM SERPL-SCNC: 143 MMOL/L (ref 136–145)
TRIGL SERPL-MCNC: 70 MG/DL (ref 0–150)
VLDLC SERPL CALC-MCNC: 14 MG/DL
WBC # BLD AUTO: 6.7 K/UL (ref 4–11)

## 2024-12-09 PROCEDURE — 85025 COMPLETE CBC W/AUTO DIFF WBC: CPT

## 2024-12-09 PROCEDURE — 84403 ASSAY OF TOTAL TESTOSTERONE: CPT

## 2024-12-09 PROCEDURE — 84270 ASSAY OF SEX HORMONE GLOBUL: CPT

## 2024-12-09 PROCEDURE — 36415 COLL VENOUS BLD VENIPUNCTURE: CPT

## 2024-12-09 PROCEDURE — 80061 LIPID PANEL: CPT

## 2024-12-09 PROCEDURE — 83036 HEMOGLOBIN GLYCOSYLATED A1C: CPT

## 2024-12-09 PROCEDURE — 80053 COMPREHEN METABOLIC PANEL: CPT

## 2024-12-10 LAB
EST. AVERAGE GLUCOSE BLD GHB EST-MCNC: 128.4 MG/DL
HBA1C MFR BLD: 6.1 %

## 2024-12-12 LAB
SHBG SERPL-SCNC: 51 NMOL/L (ref 19–76)
TESTOST FREE SERPL-MCNC: 81.3 PG/ML (ref 47–244)
TESTOST SERPL-MCNC: 514 NG/DL (ref 193–740)

## 2024-12-17 DIAGNOSIS — L40.9 PSORIASIS: ICD-10-CM

## 2024-12-17 DIAGNOSIS — R73.03 PREDIABETES: ICD-10-CM

## 2024-12-17 DIAGNOSIS — E66.812 CLASS 2 SEVERE OBESITY DUE TO EXCESS CALORIES WITH SERIOUS COMORBIDITY AND BODY MASS INDEX (BMI) OF 37.0 TO 37.9 IN ADULT: ICD-10-CM

## 2024-12-17 DIAGNOSIS — E66.01 CLASS 2 SEVERE OBESITY DUE TO EXCESS CALORIES WITH SERIOUS COMORBIDITY AND BODY MASS INDEX (BMI) OF 37.0 TO 37.9 IN ADULT: ICD-10-CM

## 2024-12-17 RX ORDER — TIRZEPATIDE 2.5 MG/.5ML
2.5 INJECTION, SOLUTION SUBCUTANEOUS
Qty: 2 ML | Refills: 0 | Status: SHIPPED | OUTPATIENT
Start: 2024-12-17

## 2024-12-17 RX ORDER — CLOBETASOL PROPIONATE 0.5 MG/G
CREAM TOPICAL
Qty: 60 G | Refills: 0 | Status: SHIPPED | OUTPATIENT
Start: 2024-12-17

## 2024-12-17 RX ORDER — OXYBUTYNIN CHLORIDE 10 MG/1
10 TABLET, EXTENDED RELEASE ORAL DAILY
Qty: 90 TABLET | Refills: 1 | Status: SHIPPED | OUTPATIENT
Start: 2024-12-17

## 2024-12-26 DIAGNOSIS — E66.01 CLASS 2 SEVERE OBESITY DUE TO EXCESS CALORIES WITH SERIOUS COMORBIDITY AND BODY MASS INDEX (BMI) OF 37.0 TO 37.9 IN ADULT: Primary | ICD-10-CM

## 2024-12-26 DIAGNOSIS — E66.812 CLASS 2 SEVERE OBESITY DUE TO EXCESS CALORIES WITH SERIOUS COMORBIDITY AND BODY MASS INDEX (BMI) OF 37.0 TO 37.9 IN ADULT: Primary | ICD-10-CM

## 2024-12-26 NOTE — TELEPHONE ENCOUNTER
Per patients wife he is to be taking .5 not 2.5 MG/ of the ZEPBBOUND. He is at the pharmacy to  and would like for the corrected prescription to be sent as soon as possible.     [facility] - [place], [place] - [address] - P [phone] - F [phone] [address], [facility] [address]  Phone: [phone]  Fax: [phone]

## 2024-12-27 ENCOUNTER — TELEPHONE (OUTPATIENT)
Dept: ADMINISTRATIVE | Age: 64
End: 2024-12-27

## 2024-12-27 NOTE — TELEPHONE ENCOUNTER
Submitted PA for Zepbound 5MG/0.5ML pen-injectors   Via [facility] (Key: [id number]) STATUS: NOT SENT.    Has the patient engaged in a trial of behavioral modification and dietary restriction for at least 3 months?*     Please answer and submit back to PA pool to complete.     Thank you.

## 2024-12-30 ENCOUNTER — PATIENT MESSAGE (OUTPATIENT)
Dept: FAMILY MEDICINE CLINIC | Age: 64
End: 2024-12-30

## 2024-12-30 DIAGNOSIS — E66.812 CLASS 2 SEVERE OBESITY DUE TO EXCESS CALORIES WITH SERIOUS COMORBIDITY AND BODY MASS INDEX (BMI) OF 37.0 TO 37.9 IN ADULT: Primary | ICD-10-CM

## 2024-12-30 DIAGNOSIS — E66.01 SEVERE OBESITY (BMI 35.0-39.9) WITH COMORBIDITY: ICD-10-CM

## 2024-12-30 DIAGNOSIS — G89.29 CHRONIC LEFT HIP PAIN: ICD-10-CM

## 2024-12-30 DIAGNOSIS — E66.812 CLASS 2 DRUG-INDUCED OBESITY WITHOUT SERIOUS COMORBIDITY WITH BODY MASS INDEX (BMI) OF 37.0 TO 37.9 IN ADULT: ICD-10-CM

## 2024-12-30 DIAGNOSIS — E66.01 CLASS 2 SEVERE OBESITY DUE TO EXCESS CALORIES WITH SERIOUS COMORBIDITY AND BODY MASS INDEX (BMI) OF 37.0 TO 37.9 IN ADULT: Primary | ICD-10-CM

## 2024-12-30 DIAGNOSIS — E66.1 CLASS 2 DRUG-INDUCED OBESITY WITHOUT SERIOUS COMORBIDITY WITH BODY MASS INDEX (BMI) OF 37.0 TO 37.9 IN ADULT: ICD-10-CM

## 2024-12-30 DIAGNOSIS — M17.0 OSTEOARTHRITIS OF BOTH KNEES, UNSPECIFIED OSTEOARTHRITIS TYPE: ICD-10-CM

## 2024-12-30 DIAGNOSIS — R73.03 PREDIABETES: ICD-10-CM

## 2024-12-30 DIAGNOSIS — E78.00 PURE HYPERCHOLESTEROLEMIA: ICD-10-CM

## 2024-12-30 DIAGNOSIS — M25.552 CHRONIC LEFT HIP PAIN: ICD-10-CM

## 2024-12-30 DIAGNOSIS — N52.9 ERECTILE DYSFUNCTION, UNSPECIFIED ERECTILE DYSFUNCTION TYPE: ICD-10-CM

## 2024-12-31 NOTE — TELEPHONE ENCOUNTER
Spoke with Pt, states he has been on a diet for 6 months and joined the [employer] and has been exercising regularly for 6 months as well. Please let me know if there is any other information that you need.

## 2025-01-02 NOTE — TELEPHONE ENCOUNTER
Rec'd a denial for medication thru Medicare part D but mentioned that this was approved under enhanced benefit for 8 months. Called [facility] to confirm the letter explanation. Spoke with Patty and she verified that this is Approved from 12/03/24-08/30/25. She ran a test claim and it did come back paid.     If this requires a response please respond to the pool ( P MHCX P[facility] MEDICATION PRE-AUTH).      Thank you please advise patient.

## 2025-02-27 ENCOUNTER — TELEPHONE (OUTPATIENT)
Dept: BARIATRICS/WEIGHT MGMT | Age: 65
End: 2025-02-27

## 2025-02-27 NOTE — TELEPHONE ENCOUNTER
Spoke w for patient regarding MWM seminar.  Reminded to arrive on time with completed paperwork, id, ins card.

## 2025-02-28 ENCOUNTER — OFFICE VISIT (OUTPATIENT)
Dept: BARIATRICS/WEIGHT MGMT | Age: 65
End: 2025-02-28

## 2025-02-28 VITALS
DIASTOLIC BLOOD PRESSURE: 88 MMHG | BODY MASS INDEX: 37.73 KG/M2 | HEIGHT: 74 IN | HEART RATE: 67 BPM | WEIGHT: 294 LBS | SYSTOLIC BLOOD PRESSURE: 129 MMHG

## 2025-02-28 DIAGNOSIS — E66.9 OBESITY (BMI 30-39.9): Primary | ICD-10-CM

## 2025-02-28 NOTE — PROGRESS NOTES
Esteban Jones is a 64 y.o. male with a date of birth of 1960.    Vitals:    02/28/25 0758   BP: 129/88   Pulse: 67   Weight: 133.4 kg (294 lb)   Height: 1.873 m (6' 1.75\")    BMI: Body mass index is 38 kg/m². Obesity Classification: Class II    Weight History:   Wt Readings from Last 3 Encounters:   02/28/25 133.4 kg (294 lb)   12/03/24 132 kg (291 lb)   04/03/24 135.2 kg (298 lb)       Pt attended Medical Weight Management Seminar. Patient was educated on low-carb diet protocol. Nutrition and habit guidelines were discussed and written information was provided. Bariatric Nutrition Questionnaire completed during class and scanned into media.       Goals  Weight: 200 lbs.   Health Improvement: able to move     Assessment  Nutritional Needs:RMR=(9.99 x 133.4 kg) + (6.25 x 187.3 cm) - (4.92 x 64 y.o.) +5  = 2188 kcal x 1.3 (sedentary activity factor)= 2844 kcal - 1000 (for 2 lb weight loss/week)= 1844 kcal.    Patient has participated in the following weight loss programs: calorie restriction and fasting.   Patient has not participated in meal replacement/liquid diets.  Patient has not participated in weight loss medications.  Patient does not have history of bariatric surgery.    Plan  Plan/Recommendations: Start 1800 Kcal LCMP  Optifast:  Pt interested in   Diet Medications:  Pt interested in     PES Statement: Overweight/Obesity related to lack of exercise, sedentary lifestyle, unhealthy eating habits, and unsuccessful diet attempts as evidenced by BMI. Body mass index is 38 kg/m².    Handouts: New Pt. Pkt., Protein Bars and Shakes     Will follow up as necessary.    Marita Lawrence, RD, LD

## 2025-03-07 ENCOUNTER — TELEMEDICINE (OUTPATIENT)
Dept: BARIATRICS/WEIGHT MGMT | Age: 65
End: 2025-03-07

## 2025-03-07 ENCOUNTER — TELEPHONE (OUTPATIENT)
Dept: BARIATRICS/WEIGHT MGMT | Age: 65
End: 2025-03-07

## 2025-03-07 DIAGNOSIS — E66.812 CLASS 2 OBESITY: Primary | ICD-10-CM

## 2025-03-07 DIAGNOSIS — Z71.3 DIETARY COUNSELING AND SURVEILLANCE: ICD-10-CM

## 2025-03-07 ASSESSMENT — ENCOUNTER SYMPTOMS
ABDOMINAL DISTENTION: 0
APNEA: 0
EYE PAIN: 0
CHOKING: 0
ABDOMINAL PAIN: 0
SHORTNESS OF BREATH: 0
BLOOD IN STOOL: 0
COUGH: 0
VOMITING: 0
PHOTOPHOBIA: 0
CONSTIPATION: 0
NAUSEA: 0
CHEST TIGHTNESS: 0
WHEEZING: 0
DIARRHEA: 0

## 2025-03-07 NOTE — TELEPHONE ENCOUNTER
Spoke with patient. Advised to contact the office to schedule a follow-up appointment with Dr. Jackson once fasting labs have been completed. Patient verbalized understanding.

## 2025-03-07 NOTE — PROGRESS NOTES
PERCUTANEOUS 1/10/2022 [facility] CT SCAN    ESOPHAGUS SURGERY  2019    hernia    FOOT SURGERY  2019    bilateraly hammer toes    UPPER GASTROINTESTINAL ENDOSCOPY         Family History   Problem Relation Age of Onset    Hypertension Mother     Diabetes Mother     Stroke Mother     High Blood Pressure Mother     High Cholesterol Mother     Asthma Father     Alcohol Abuse Father        Review of Systems   Constitutional:  Negative for fatigue.   Eyes:  Negative for photophobia, pain and visual disturbance.   Respiratory:  Negative for apnea, cough, choking, chest tightness, shortness of breath and wheezing.    Cardiovascular:  Negative for chest pain, palpitations and leg swelling.   Gastrointestinal:  Negative for abdominal distention, abdominal pain, blood in stool, constipation, diarrhea, nausea and vomiting.   Endocrine: Negative for cold intolerance and heat intolerance.   Musculoskeletal:  Negative for arthralgias and myalgias.   Skin:  Negative for rash.   Neurological:  Negative for dizziness, tremors, syncope, weakness, numbness and headaches.   Psychiatric/Behavioral:  Negative for agitation, confusion, decreased concentration, dysphoric mood, hallucinations, sleep disturbance and suicidal ideas. The patient is not nervous/anxious and is not hyperactive.        Physical Exam  Constitutional:       Appearance: He is well-developed.   HENT:      Head: Normocephalic.   Eyes:      Conjunctiva/sclera: Conjunctivae normal.   Abdominal:      General: Abdomen is protuberant.   Musculoskeletal:         General: No swelling.   Neurological:      Mental Status: He is alert and oriented to person, place, and time.   Psychiatric:         Mood and Affect: Mood normal.         Behavior: Behavior normal.         Thought Content: Thought content normal.         Judgment: Judgment normal.         Hospital Outpatient Visit on 12/09/2024   Component Date Value Ref Range Status    Testosterone 12/09/2024 514  193 - 740 ng/dL Final

## 2025-03-10 ENCOUNTER — HOSPITAL ENCOUNTER (OUTPATIENT)
Age: 65
Discharge: HOME | End: 2025-03-10
Payer: MEDICARE

## 2025-03-10 DIAGNOSIS — E66.812 CLASS 2 OBESITY: ICD-10-CM

## 2025-03-10 DIAGNOSIS — L40.9 PSORIASIS: ICD-10-CM

## 2025-03-10 LAB
25(OH)D3 SERPL-MCNC: 26.8 NG/ML
ALBUMIN SERPL-MCNC: 4.3 G/DL (ref 3.4–5)
ALBUMIN/GLOB SERPL: 1.3 {RATIO} (ref 1.1–2.2)
ALP SERPL-CCNC: 49 U/L (ref 40–129)
ALT SERPL-CCNC: 52 U/L (ref 10–40)
ANION GAP SERPL CALCULATED.3IONS-SCNC: 11 MMOL/L (ref 3–16)
AST SERPL-CCNC: 28 U/L (ref 15–37)
BASOPHILS # BLD: 0.1 K/UL (ref 0–0.2)
BASOPHILS NFR BLD: 1.7 %
BILIRUB SERPL-MCNC: 0.7 MG/DL (ref 0–1)
BUN SERPL-MCNC: 23 MG/DL (ref 7–20)
CALCIUM SERPL-MCNC: 9.9 MG/DL (ref 8.3–10.6)
CHLORIDE SERPL-SCNC: 102 MMOL/L (ref 99–110)
CHOLEST SERPL-MCNC: 186 MG/DL (ref 0–199)
CO2 SERPL-SCNC: 27 MMOL/L (ref 21–32)
CREAT SERPL-MCNC: 0.9 MG/DL (ref 0.8–1.3)
DEPRECATED RDW RBC AUTO: 14 % (ref 12.4–15.4)
EOSINOPHIL # BLD: 0 K/UL (ref 0–0.6)
EOSINOPHIL NFR BLD: 0.4 %
EST. AVERAGE GLUCOSE BLD GHB EST-MCNC: 128.4 MG/DL
FOLATE SERPL-MCNC: 11.6 NG/ML (ref 4.78–24.2)
GFR SERPLBLD CREATININE-BSD FMLA CKD-EPI: >90 ML/MIN/{1.73_M2}
GLUCOSE SERPL-MCNC: 102 MG/DL (ref 70–99)
HBA1C MFR BLD: 6.1 %
HCT VFR BLD AUTO: 52.3 % (ref 40.5–52.5)
HDLC SERPL-MCNC: 35 MG/DL (ref 40–60)
HGB BLD-MCNC: 17.5 G/DL (ref 13.5–17.5)
LDLC SERPL CALC-MCNC: 125 MG/DL
LYMPHOCYTES # BLD: 2.8 K/UL (ref 1–5.1)
LYMPHOCYTES NFR BLD: 42.7 %
MCH RBC QN AUTO: 30.7 PG (ref 26–34)
MCHC RBC AUTO-ENTMCNC: 33.4 G/DL (ref 31–36)
MCV RBC AUTO: 91.9 FL (ref 80–100)
MONOCYTES # BLD: 0.8 K/UL (ref 0–1.3)
MONOCYTES NFR BLD: 12.8 %
NEUTROPHILS # BLD: 2.8 K/UL (ref 1.7–7.7)
NEUTROPHILS NFR BLD: 42.4 %
PLATELET # BLD AUTO: 212 K/UL (ref 135–450)
PMV BLD AUTO: 8.9 FL (ref 5–10.5)
POTASSIUM SERPL-SCNC: 4.3 MMOL/L (ref 3.5–5.1)
PROT SERPL-MCNC: 7.5 G/DL (ref 6.4–8.2)
RBC # BLD AUTO: 5.69 M/UL (ref 4.2–5.9)
SODIUM SERPL-SCNC: 140 MMOL/L (ref 136–145)
TRIGL SERPL-MCNC: 132 MG/DL (ref 0–150)
TSH SERPL DL<=0.005 MIU/L-ACNC: 1.41 UIU/ML (ref 0.27–4.2)
VIT B12 SERPL-MCNC: 465 PG/ML (ref 211–911)
VLDLC SERPL CALC-MCNC: 26 MG/DL
WBC # BLD AUTO: 6.6 K/UL (ref 4–11)

## 2025-03-10 PROCEDURE — 82306 VITAMIN D 25 HYDROXY: CPT

## 2025-03-10 PROCEDURE — 82746 ASSAY OF FOLIC ACID SERUM: CPT

## 2025-03-10 PROCEDURE — 82607 VITAMIN B-12: CPT

## 2025-03-10 PROCEDURE — 36415 COLL VENOUS BLD VENIPUNCTURE: CPT

## 2025-03-10 PROCEDURE — 80061 LIPID PANEL: CPT

## 2025-03-10 PROCEDURE — 80053 COMPREHEN METABOLIC PANEL: CPT

## 2025-03-10 PROCEDURE — 83036 HEMOGLOBIN GLYCOSYLATED A1C: CPT

## 2025-03-10 PROCEDURE — 84443 ASSAY THYROID STIM HORMONE: CPT

## 2025-03-10 PROCEDURE — 85025 COMPLETE CBC W/AUTO DIFF WBC: CPT

## 2025-03-11 RX ORDER — CLOBETASOL PROPIONATE 0.5 MG/G
CREAM TOPICAL
Qty: 60 G | Refills: 0 | Status: SHIPPED | OUTPATIENT
Start: 2025-03-11

## 2025-03-18 ENCOUNTER — TELEPHONE (OUTPATIENT)
Dept: BARIATRICS/WEIGHT MGMT | Age: 65
End: 2025-03-18

## 2025-03-18 ENCOUNTER — TELEMEDICINE (OUTPATIENT)
Dept: BARIATRICS/WEIGHT MGMT | Age: 65
End: 2025-03-18
Payer: MEDICARE

## 2025-03-18 DIAGNOSIS — E55.9 VITAMIN D INSUFFICIENCY: ICD-10-CM

## 2025-03-18 DIAGNOSIS — E66.812 CLASS 2 OBESITY: Primary | ICD-10-CM

## 2025-03-18 DIAGNOSIS — E78.5 HYPERLIPIDEMIA, UNSPECIFIED HYPERLIPIDEMIA TYPE: ICD-10-CM

## 2025-03-18 DIAGNOSIS — Z71.3 DIETARY COUNSELING AND SURVEILLANCE: ICD-10-CM

## 2025-03-18 DIAGNOSIS — R74.01 ELEVATED ALT MEASUREMENT: ICD-10-CM

## 2025-03-18 DIAGNOSIS — R73.03 PREDIABETES: ICD-10-CM

## 2025-03-18 PROCEDURE — 1159F MED LIST DOCD IN RCRD: CPT | Performed by: FAMILY MEDICINE

## 2025-03-18 PROCEDURE — 3017F COLORECTAL CA SCREEN DOC REV: CPT | Performed by: FAMILY MEDICINE

## 2025-03-18 PROCEDURE — 1160F RVW MEDS BY RX/DR IN RCRD: CPT | Performed by: FAMILY MEDICINE

## 2025-03-18 PROCEDURE — 99214 OFFICE O/P EST MOD 30 MIN: CPT | Performed by: FAMILY MEDICINE

## 2025-03-18 PROCEDURE — G2211 COMPLEX E/M VISIT ADD ON: HCPCS | Performed by: FAMILY MEDICINE

## 2025-03-18 PROCEDURE — 1123F ACP DISCUSS/DSCN MKR DOCD: CPT | Performed by: FAMILY MEDICINE

## 2025-03-18 PROCEDURE — G8427 DOCREV CUR MEDS BY ELIG CLIN: HCPCS | Performed by: FAMILY MEDICINE

## 2025-03-18 ASSESSMENT — ENCOUNTER SYMPTOMS
ABDOMINAL PAIN: 0
ABDOMINAL DISTENTION: 0
CHEST TIGHTNESS: 0
APNEA: 0
COUGH: 0
NAUSEA: 0
VOMITING: 0
BLOOD IN STOOL: 0
PHOTOPHOBIA: 0
CONSTIPATION: 0
DIARRHEA: 0
CHOKING: 0
EYE PAIN: 0
WHEEZING: 0
SHORTNESS OF BREATH: 0

## 2025-03-18 NOTE — TELEPHONE ENCOUNTER
Per Dr. Jackson; patient advised to contact the office to schedule 4 week follow-up appointment once Wegovy medication has been picked up

## 2025-03-18 NOTE — PROGRESS NOTES
Patient: Esteban Jones                      Encounter Date: 3/18/2025    YOB: 1960                Age: 65 y.o.    Chief Complaint   Patient presents with    Weight Management     F/u MWM          Patient identification was verified at the start of the visit.         3/17/2025     9:47 AM   Patient-Reported Vitals   Patient-Reported Weight 290   Patient-Reported Height 6'2\"   Patient-Reported Systolic 120 mmHg   Patient-Reported Diastolic 80 mmHg         BP Readings from Last 1 Encounters:   02/28/25 129/88       BMI Readings from Last 1 Encounters:   02/28/25 38.00 kg/m²       Pulse Readings from Last 1 Encounters:   02/28/25 67          Wt Readings from Last 3 Encounters:   02/28/25 133.4 kg (294 lb)   12/03/24 132 kg (291 lb)   04/03/24 135.2 kg (298 lb)        HPI: 65 y.o. male with a long-standing history of obesity presents today for virtual video follow-up. He has lost 4 pounds since his last visit. Current treatment includes low carb/zuleima diet. Making better dietary choices. Motivated to continue losing weight.     Labs reviewed with patient:  ALT 52  HbA1c 6.1    HDL 35  Vit D 26.8      No Known Allergies      Current Outpatient Medications:     Semaglutide-Weight Management (WEGOVY) 0.25 MG/0.5ML SOAJ SC injection, Inject 0.25 mg into the skin every 7 days, Disp: 2 mL, Rfl: 0    clobetasol (TEMOVATE) 0.05 % cream, Apply topically 2 times daily., Disp: 60 g, Rfl: 0    oxyBUTYnin (DITROPAN-XL) 10 MG extended release tablet, Take 1 tablet by mouth daily, Disp: 90 tablet, Rfl: 1    desvenlafaxine succinate (PRISTIQ) 50 MG TB24 extended release tablet, Take 1 tablet by mouth daily, Disp: 90 tablet, Rfl: 1    metFORMIN (GLUCOPHAGE) 500 MG tablet, Take 1 tablet by mouth daily (with breakfast), Disp: 90 tablet, Rfl: 1    meloxicam (MOBIC) 15 MG tablet, Take 1 tablet by mouth daily as needed for Pain (knee pain), Disp: 90 tablet, Rfl: 0    Patient Active Problem List   Diagnosis    Benign

## 2025-03-20 NOTE — TELEPHONE ENCOUNTER
Submitted PA for Wegovy Via [facility] Key: [id number] STATUS: \"[facility] is unable to respond with clinical questions. Note any additional information provided by [facility] at the bottom of this request, and contact [facility] at [phone] for further details.\" (Member ID on file does not match the Member ID on the Authorization Request).    *Called and spoke to Emmy S. No patient was found.    Submitted PA for Wegovy Via [facility] [id number] STATUS: PENDING. (Chose [facility] Form)    Follow up done daily; if no decision with in three days we will refax.  If another three days goes by with no decision will call the insurance for status.

## 2025-04-06 DIAGNOSIS — M17.0 OSTEOARTHRITIS OF BOTH KNEES, UNSPECIFIED OSTEOARTHRITIS TYPE: ICD-10-CM

## 2025-04-07 RX ORDER — MELOXICAM 15 MG/1
TABLET ORAL
Qty: 90 TABLET | Refills: 0 | Status: SHIPPED | OUTPATIENT
Start: 2025-04-07

## 2025-04-07 NOTE — TELEPHONE ENCOUNTER
Medication:   Requested Prescriptions     Pending Prescriptions Disp Refills    meloxicam (MOBIC) 15 MG tablet [Pharmacy Med Name: Meloxicam Oral Tablet 15 MG] 90 tablet 0     Sig: TAKE 1 TABLET BY MOUTH EVERY DAY AS NEEDED FOR KNEE PAIN          Patient Phone Number: [phone] (home)     Last appt: 12/3/2024   Next appt: Visit date not found    Last OARRS:        No data to display              PDMP Monitoring:    Last PDMP [place] as Reviewed ([place]):  Review User Review Instant Review Result          Preferred Pharmacy:   [facility] - [place], [place] - [address] - P [phone] - F [phone] [address]  [facility] [address]  Phone: [phone] Fax: [phone]

## 2025-04-18 ENCOUNTER — TELEMEDICINE (OUTPATIENT)
Dept: BARIATRICS/WEIGHT MGMT | Age: 65
End: 2025-04-18

## 2025-04-18 DIAGNOSIS — Z71.3 DIETARY COUNSELING AND SURVEILLANCE: ICD-10-CM

## 2025-04-18 DIAGNOSIS — E66.812 CLASS 2 OBESITY: Primary | ICD-10-CM

## 2025-04-18 NOTE — PROGRESS NOTES
audio-video encounter. The patient (or guardian if applicable) is aware that this is a billable service, which includes applicable co-pays. This Virtual Visit was conducted with patient's (and/or legal guardian's) consent. Patient identification was verified, and a caregiver was present when appropriate.   The patient was located at Home: [address] [facility] [address]  Provider was located at Home (Appt Dept State): [place]  Confirm you are appropriately licensed, registered, or certified to deliver care in the state where the patient is located as indicated above. If you are not or unsure, please re-schedule the visit: Yes, I confirm.        Total time spent for this encounter: Not billed by time    --Ulises Jackson MD on 5/2/2025 at 3:08 PM    An electronic signature was used to authenticate this note.

## 2025-05-14 ENCOUNTER — TELEMEDICINE (OUTPATIENT)
Dept: BARIATRICS/WEIGHT MGMT | Age: 65
End: 2025-05-14

## 2025-05-14 DIAGNOSIS — Z71.3 DIETARY COUNSELING AND SURVEILLANCE: ICD-10-CM

## 2025-05-14 DIAGNOSIS — E66.812 CLASS 2 OBESITY: Primary | ICD-10-CM

## 2025-05-14 ASSESSMENT — ENCOUNTER SYMPTOMS
DIARRHEA: 0
CHEST TIGHTNESS: 0
VOMITING: 0
WHEEZING: 0
EYE PAIN: 0
NAUSEA: 0
BLOOD IN STOOL: 0
PHOTOPHOBIA: 0
ABDOMINAL DISTENTION: 0
ABDOMINAL PAIN: 0
SHORTNESS OF BREATH: 0
CONSTIPATION: 0
APNEA: 0
COUGH: 0
CHOKING: 0

## 2025-05-14 NOTE — PROGRESS NOTES
Patient: Esteban Jones                      Encounter Date: 5/14/2025    YOB: 1960                Age: 65 y.o.    Chief Complaint   Patient presents with    Weight Management     F/u BOB          Patient identification was verified at the start of the visit.         5/14/2025     8:20 AM   Patient-Reported Vitals   Patient-Reported Weight 281   Patient-Reported Height 6'2\"   Patient-Reported Systolic 120 mmHg   Patient-Reported Diastolic 85 mmHg         BP Readings from Last 1 Encounters:   02/28/25 129/88       BMI Readings from Last 1 Encounters:   02/28/25 38.00 kg/m²       Pulse Readings from Last 1 Encounters:   02/28/25 67       HPI: 65 y.o. male with a long-standing history of obesity presents today for virtual video follow-up. He has lost 5 pounds since his last visit. Current treatment includes Wegovy 0.5 mg SC weekly and low carb/zuleima diet. No issues. Happy with progress. Hasn't been physically active.      Medication(s): Appetite well controlled?     [x]Yes      []No                          Focus:     [x]Good     []Fair     []Poor                          Side effects? No        Any recent change in medication(s)? No       No Known Allergies      Current Outpatient Medications:     Semaglutide-Weight Management (WEGOVY) 1 MG/0.5ML SOAJ SC injection, Inject 1 mg into the skin every 7 days, Disp: 2 mL, Rfl: 0    meloxicam (MOBIC) 15 MG tablet, TAKE 1 TABLET BY MOUTH EVERY DAY AS NEEDED FOR KNEE PAIN, Disp: 90 tablet, Rfl: 0    clobetasol (TEMOVATE) 0.05 % cream, Apply topically 2 times daily., Disp: 60 g, Rfl: 0    oxyBUTYnin (DITROPAN-XL) 10 MG extended release tablet, Take 1 tablet by mouth daily, Disp: 90 tablet, Rfl: 1    desvenlafaxine succinate (PRISTIQ) 50 MG TB24 extended release tablet, Take 1 tablet by mouth daily, Disp: 90 tablet, Rfl: 1    metFORMIN (GLUCOPHAGE) 500 MG tablet, Take 1 tablet by mouth daily (with breakfast), Disp: 90 tablet, Rfl: 1    Patient Active Problem List

## 2025-05-30 DIAGNOSIS — F41.9 ANXIETY: ICD-10-CM

## 2025-05-30 RX ORDER — DESVENLAFAXINE 50 MG/1
50 TABLET, FILM COATED, EXTENDED RELEASE ORAL DAILY
Qty: 90 TABLET | Refills: 0 | Status: SHIPPED | OUTPATIENT
Start: 2025-05-30

## 2025-06-11 ENCOUNTER — TELEMEDICINE (OUTPATIENT)
Dept: BARIATRICS/WEIGHT MGMT | Age: 65
End: 2025-06-11

## 2025-06-11 DIAGNOSIS — Z71.3 DIETARY COUNSELING AND SURVEILLANCE: ICD-10-CM

## 2025-06-11 DIAGNOSIS — E66.812 CLASS 2 OBESITY: Primary | ICD-10-CM

## 2025-06-11 ASSESSMENT — ENCOUNTER SYMPTOMS
BLOOD IN STOOL: 0
WHEEZING: 0
PHOTOPHOBIA: 0
NAUSEA: 0
APNEA: 0
SHORTNESS OF BREATH: 0
DIARRHEA: 0
ABDOMINAL PAIN: 0
CHEST TIGHTNESS: 0
VOMITING: 0
COUGH: 0
CONSTIPATION: 0
ABDOMINAL DISTENTION: 0
CHOKING: 0
EYE PAIN: 0

## 2025-06-11 NOTE — PROGRESS NOTES
synchronous (real-time) audio-video encounter. The patient (or guardian if applicable) is aware that this is a billable service, which includes applicable co-pays. This Virtual Visit was conducted with patient's (and/or legal guardian's) consent. Patient identification was verified, and a caregiver was present when appropriate.   The patient was located at Home: [address] [place] [address]  Provider was located at Home (Appt Dept State): [place]  Confirm you are appropriately licensed, registered, or certified to deliver care in the state where the patient is located as indicated above. If you are not or unsure, please re-schedule the visit: Yes, I confirm.        Total time spent for this encounter: Not billed by time    --Ulises Jackson MD on 6/11/2025 at 1:53 PM    An electronic signature was used to authenticate this note.

## 2025-07-14 ENCOUNTER — TELEMEDICINE (OUTPATIENT)
Dept: BARIATRICS/WEIGHT MGMT | Age: 65
End: 2025-07-14
Payer: MEDICARE

## 2025-07-14 DIAGNOSIS — E66.812 CLASS 2 OBESITY: Primary | ICD-10-CM

## 2025-07-14 DIAGNOSIS — Z71.3 DIETARY COUNSELING AND SURVEILLANCE: ICD-10-CM

## 2025-07-14 PROCEDURE — G8427 DOCREV CUR MEDS BY ELIG CLIN: HCPCS | Performed by: FAMILY MEDICINE

## 2025-07-14 PROCEDURE — 1160F RVW MEDS BY RX/DR IN RCRD: CPT | Performed by: FAMILY MEDICINE

## 2025-07-14 PROCEDURE — 3017F COLORECTAL CA SCREEN DOC REV: CPT | Performed by: FAMILY MEDICINE

## 2025-07-14 PROCEDURE — 1123F ACP DISCUSS/DSCN MKR DOCD: CPT | Performed by: FAMILY MEDICINE

## 2025-07-14 PROCEDURE — 99214 OFFICE O/P EST MOD 30 MIN: CPT | Performed by: FAMILY MEDICINE

## 2025-07-14 PROCEDURE — G2211 COMPLEX E/M VISIT ADD ON: HCPCS | Performed by: FAMILY MEDICINE

## 2025-07-14 PROCEDURE — 1159F MED LIST DOCD IN RCRD: CPT | Performed by: FAMILY MEDICINE

## 2025-07-14 ASSESSMENT — ENCOUNTER SYMPTOMS
BLOOD IN STOOL: 0
ABDOMINAL DISTENTION: 0
DIARRHEA: 0
COUGH: 0
EYE PAIN: 0
CHOKING: 0
APNEA: 0
CHEST TIGHTNESS: 0
WHEEZING: 0
PHOTOPHOBIA: 0
SHORTNESS OF BREATH: 0
VOMITING: 0
CONSTIPATION: 0
ABDOMINAL PAIN: 0
NAUSEA: 0

## 2025-07-14 NOTE — PROGRESS NOTES
Patient: Esteban Jones                      Encounter Date: 7/14/2025    YOB: 1960                Age: 65 y.o.    Chief Complaint   Patient presents with    Weight Management     F/u MWHOLDEN          Patient identification was verified at the start of the visit.         7/14/2025     1:42 PM   Patient-Reported Vitals   Patient-Reported Weight 273   Patient-Reported Height 6’ 2”   Patient-Reported Systolic 140 mmHg   Patient-Reported Diastolic 85 mmHg         BP Readings from Last 1 Encounters:   02/28/25 129/88       BMI Readings from Last 1 Encounters:   02/28/25 38.00 kg/m²       Pulse Readings from Last 1 Encounters:   02/28/25 67     Wt Readings from Last 3 Encounters:   02/28/25 133.4 kg (294 lb)   12/03/24 132 kg (291 lb)   04/03/24 135.2 kg (298 lb)         HPI: 65 y.o. male with a long-standing history of obesity presents today for virtual video follow-up. He has lost 2 pounds since his last visit. Current treatment includes Wegovy 1 mg SC weekly.  He got off track with his diet because of his grandson's birthday and Fourth of July celebration.  He is motivated to continue losing weight.     Medication(s): Appetite well controlled?     []Yes      [x]No                          Focus:     []Good     [x]Fair     []Poor                          Side effects? No        Any recent change in medication(s)? No        No Known Allergies      Current Outpatient Medications:     Semaglutide-Weight Management (WEGOVY) 1.7 MG/0.75ML SOAJ SC injection, Inject 1.7 mg into the skin every 7 days, Disp: 3 mL, Rfl: 0    desvenlafaxine succinate (PRISTIQ) 50 MG TB24 extended release tablet, TAKE 1 TABLET BY MOUTH EVERY DAY, Disp: 90 tablet, Rfl: 0    meloxicam (MOBIC) 15 MG tablet, TAKE 1 TABLET BY MOUTH EVERY DAY AS NEEDED FOR KNEE PAIN, Disp: 90 tablet, Rfl: 0    clobetasol (TEMOVATE) 0.05 % cream, Apply topically 2 times daily., Disp: 60 g, Rfl: 0    oxyBUTYnin (DITROPAN-XL) 10 MG extended release tablet, Take 1

## 2025-08-04 VITALS — BODY MASS INDEX: 34.67 KG/M2 | WEIGHT: 268.2 LBS

## 2025-08-04 VITALS — WEIGHT: 268.2 LBS | BODY MASS INDEX: 34.67 KG/M2

## 2025-08-21 ENCOUNTER — TELEMEDICINE (OUTPATIENT)
Dept: BARIATRICS/WEIGHT MGMT | Age: 65
End: 2025-08-21

## 2025-08-21 DIAGNOSIS — Z71.3 DIETARY COUNSELING AND SURVEILLANCE: ICD-10-CM

## 2025-08-21 DIAGNOSIS — E66.811 CLASS 1 OBESITY: Primary | ICD-10-CM

## 2025-08-21 SDOH — ECONOMIC STABILITY: TRANSPORTATION INSECURITY
IN THE PAST 12 MONTHS, HAS THE LACK OF TRANSPORTATION KEPT YOU FROM MEDICAL APPOINTMENTS OR FROM GETTING MEDICATIONS?: NO

## 2025-08-21 SDOH — ECONOMIC STABILITY: FOOD INSECURITY: WITHIN THE PAST 12 MONTHS, THE FOOD YOU BOUGHT JUST DIDN'T LAST AND YOU DIDN'T HAVE MONEY TO GET MORE.: NEVER TRUE

## 2025-08-21 SDOH — HEALTH STABILITY: PHYSICAL HEALTH: ON AVERAGE, HOW MANY DAYS PER WEEK DO YOU ENGAGE IN MODERATE TO STRENUOUS EXERCISE (LIKE A BRISK WALK)?: 3 DAYS

## 2025-08-21 SDOH — ECONOMIC STABILITY: INCOME INSECURITY: IN THE LAST 12 MONTHS, WAS THERE A TIME WHEN YOU WERE NOT ABLE TO PAY THE MORTGAGE OR RENT ON TIME?: NO

## 2025-08-21 SDOH — HEALTH STABILITY: PHYSICAL HEALTH: ON AVERAGE, HOW MANY MINUTES DO YOU ENGAGE IN EXERCISE AT THIS LEVEL?: 40 MIN

## 2025-08-21 SDOH — ECONOMIC STABILITY: FOOD INSECURITY: WITHIN THE PAST 12 MONTHS, YOU WORRIED THAT YOUR FOOD WOULD RUN OUT BEFORE YOU GOT MONEY TO BUY MORE.: NEVER TRUE

## 2025-08-21 ASSESSMENT — ENCOUNTER SYMPTOMS
APNEA: 0
SHORTNESS OF BREATH: 0
ABDOMINAL PAIN: 0
VOMITING: 0
PHOTOPHOBIA: 0
CONSTIPATION: 0
EYE PAIN: 0
DIARRHEA: 0
WHEEZING: 0
CHEST TIGHTNESS: 0
ABDOMINAL DISTENTION: 0
CHOKING: 0
COUGH: 0
NAUSEA: 0
BLOOD IN STOOL: 0

## 2025-08-21 ASSESSMENT — PATIENT HEALTH QUESTIONNAIRE - PHQ9
SUM OF ALL RESPONSES TO PHQ QUESTIONS 1-9: 0
1. LITTLE INTEREST OR PLEASURE IN DOING THINGS: NOT AT ALL
SUM OF ALL RESPONSES TO PHQ QUESTIONS 1-9: 0
2. FEELING DOWN, DEPRESSED OR HOPELESS: NOT AT ALL

## 2025-08-21 ASSESSMENT — LIFESTYLE VARIABLES
HOW OFTEN DO YOU HAVE A DRINK CONTAINING ALCOHOL: 2
HOW MANY STANDARD DRINKS CONTAINING ALCOHOL DO YOU HAVE ON A TYPICAL DAY: 1
HOW MANY STANDARD DRINKS CONTAINING ALCOHOL DO YOU HAVE ON A TYPICAL DAY: 1 OR 2
HOW OFTEN DO YOU HAVE SIX OR MORE DRINKS ON ONE OCCASION: 1
HOW OFTEN DO YOU HAVE A DRINK CONTAINING ALCOHOL: MONTHLY OR LESS

## 2025-08-27 ENCOUNTER — HOSPITAL ENCOUNTER (OUTPATIENT)
Age: 65
Discharge: HOME | End: 2025-08-27
Payer: MEDICARE

## 2025-08-27 ENCOUNTER — OFFICE VISIT (OUTPATIENT)
Dept: FAMILY MEDICINE CLINIC | Age: 65
End: 2025-08-27

## 2025-08-27 ENCOUNTER — HOSPITAL ENCOUNTER (OUTPATIENT)
Dept: GENERAL RADIOLOGY | Age: 65
Discharge: HOME | End: 2025-08-27
Payer: MEDICARE

## 2025-08-27 VITALS
SYSTOLIC BLOOD PRESSURE: 126 MMHG | HEART RATE: 67 BPM | DIASTOLIC BLOOD PRESSURE: 77 MMHG | OXYGEN SATURATION: 97 % | TEMPERATURE: 97.3 F | HEIGHT: 74 IN | BODY MASS INDEX: 35.42 KG/M2 | WEIGHT: 276 LBS

## 2025-08-27 DIAGNOSIS — L40.9 PSORIASIS: ICD-10-CM

## 2025-08-27 DIAGNOSIS — M17.0 OSTEOARTHRITIS OF BOTH KNEES, UNSPECIFIED OSTEOARTHRITIS TYPE: ICD-10-CM

## 2025-08-27 DIAGNOSIS — F41.9 ANXIETY: ICD-10-CM

## 2025-08-27 DIAGNOSIS — Z00.00 ENCOUNTER FOR SUBSEQUENT ANNUAL WELLNESS VISIT (AWV) IN MEDICARE PATIENT: Primary | ICD-10-CM

## 2025-08-27 DIAGNOSIS — Z12.11 SCREEN FOR COLON CANCER: ICD-10-CM

## 2025-08-27 PROCEDURE — 73562 X-RAY EXAM OF KNEE 3: CPT

## 2025-08-27 RX ORDER — DESVENLAFAXINE 50 MG/1
50 TABLET, FILM COATED, EXTENDED RELEASE ORAL DAILY
Qty: 90 TABLET | Refills: 3 | Status: SHIPPED | OUTPATIENT
Start: 2025-08-27

## 2025-08-27 RX ORDER — MELOXICAM 15 MG/1
15 TABLET ORAL DAILY
Qty: 90 TABLET | Refills: 1 | Status: SHIPPED | OUTPATIENT
Start: 2025-08-27

## 2025-08-27 RX ORDER — CLOBETASOL PROPIONATE 0.5 MG/G
CREAM TOPICAL
Qty: 60 G | Refills: 0 | Status: SHIPPED | OUTPATIENT
Start: 2025-08-27